# Patient Record
Sex: FEMALE | Race: BLACK OR AFRICAN AMERICAN | Employment: FULL TIME
[De-identification: names, ages, dates, MRNs, and addresses within clinical notes are randomized per-mention and may not be internally consistent; named-entity substitution may affect disease eponyms.]

---

## 2017-01-10 ENCOUNTER — ROUTINE PRENATAL (OUTPATIENT)
Dept: OBGYN | Facility: CLINIC | Age: 27
End: 2017-01-10

## 2017-01-10 VITALS
BODY MASS INDEX: 54.39 KG/M2 | HEART RATE: 88 BPM | WEIGHT: 293 LBS | SYSTOLIC BLOOD PRESSURE: 116 MMHG | DIASTOLIC BLOOD PRESSURE: 68 MMHG

## 2017-01-10 DIAGNOSIS — Z3A.38 38 WEEKS GESTATION OF PREGNANCY: Primary | ICD-10-CM

## 2017-01-10 DIAGNOSIS — N89.8 VAGINAL DISCHARGE: ICD-10-CM

## 2017-01-10 PROCEDURE — 99213 OFFICE O/P EST LOW 20 MIN: CPT | Performed by: OBSTETRICS & GYNECOLOGY

## 2017-01-18 RX ORDER — CLINDAMYCIN HYDROCHLORIDE 300 MG/1
300 CAPSULE ORAL 2 TIMES DAILY
Qty: 14 CAPSULE | Refills: 0 | Status: SHIPPED | OUTPATIENT
Start: 2017-01-18 | End: 2017-01-25

## 2017-02-08 ENCOUNTER — OFFICE VISIT (OUTPATIENT)
Dept: OBGYN | Facility: CLINIC | Age: 27
End: 2017-02-08

## 2017-02-08 VITALS
SYSTOLIC BLOOD PRESSURE: 120 MMHG | DIASTOLIC BLOOD PRESSURE: 82 MMHG | BODY MASS INDEX: 47.09 KG/M2 | HEIGHT: 66 IN | HEART RATE: 78 BPM | WEIGHT: 293 LBS

## 2017-02-08 DIAGNOSIS — Z98.891 S/P C-SECTION: ICD-10-CM

## 2017-02-08 DIAGNOSIS — Z30.09 FAMILY PLANNING EDUCATION, GUIDANCE, AND COUNSELING: ICD-10-CM

## 2017-02-08 DIAGNOSIS — D64.9 ANEMIA, UNSPECIFIED TYPE: ICD-10-CM

## 2017-02-08 PROCEDURE — 99024 POSTOP FOLLOW-UP VISIT: CPT | Performed by: NURSE PRACTITIONER

## 2017-02-08 ASSESSMENT — PATIENT HEALTH QUESTIONNAIRE - PHQ9
5. POOR APPETITE OR OVEREATING: 0
SUM OF ALL RESPONSES TO PHQ QUESTIONS 1-9: 5
9. THOUGHTS THAT YOU WOULD BE BETTER OFF DEAD, OR OF HURTING YOURSELF: 0
7. TROUBLE CONCENTRATING ON THINGS, SUCH AS READING THE NEWSPAPER OR WATCHING TELEVISION: 0
3. TROUBLE FALLING OR STAYING ASLEEP: 2
8. MOVING OR SPEAKING SO SLOWLY THAT OTHER PEOPLE COULD HAVE NOTICED. OR THE OPPOSITE, BEING SO FIGETY OR RESTLESS THAT YOU HAVE BEEN MOVING AROUND A LOT MORE THAN USUAL: 0
4. FEELING TIRED OR HAVING LITTLE ENERGY: 1
1. LITTLE INTEREST OR PLEASURE IN DOING THINGS: 1
2. FEELING DOWN, DEPRESSED OR HOPELESS: 1
6. FEELING BAD ABOUT YOURSELF - OR THAT YOU ARE A FAILURE OR HAVE LET YOURSELF OR YOUR FAMILY DOWN: 0
SUM OF ALL RESPONSES TO PHQ9 QUESTIONS 1 & 2: 2

## 2017-03-08 ENCOUNTER — OFFICE VISIT (OUTPATIENT)
Dept: OBGYN | Facility: CLINIC | Age: 27
End: 2017-03-08

## 2017-03-08 VITALS
HEIGHT: 66 IN | SYSTOLIC BLOOD PRESSURE: 120 MMHG | BODY MASS INDEX: 47.09 KG/M2 | WEIGHT: 293 LBS | DIASTOLIC BLOOD PRESSURE: 76 MMHG | HEART RATE: 78 BPM | RESPIRATION RATE: 15 BRPM

## 2017-03-08 DIAGNOSIS — Z32.02 NEGATIVE PREGNANCY TEST: ICD-10-CM

## 2017-03-08 DIAGNOSIS — Z30.42 FAMILY PLANNING, DEPO-PROVERA CONTRACEPTION MONITORING/ADMINISTRATION: ICD-10-CM

## 2017-03-08 DIAGNOSIS — Z98.891 S/P C-SECTION: ICD-10-CM

## 2017-03-08 LAB
CONTROL: NORMAL
PREGNANCY TEST URINE, POC: NEGATIVE

## 2017-03-08 PROCEDURE — 81025 URINE PREGNANCY TEST: CPT | Performed by: NURSE PRACTITIONER

## 2017-03-08 RX ORDER — MEDROXYPROGESTERONE ACETATE 150 MG/ML
150 INJECTION, SUSPENSION INTRAMUSCULAR ONCE
Status: COMPLETED | OUTPATIENT
Start: 2017-03-08 | End: 2017-03-08

## 2017-03-08 RX ADMIN — MEDROXYPROGESTERONE ACETATE 150 MG: 150 INJECTION, SUSPENSION INTRAMUSCULAR at 11:48

## 2017-06-05 ENCOUNTER — NURSE ONLY (OUTPATIENT)
Dept: OBGYN CLINIC | Age: 27
End: 2017-06-05
Payer: MEDICAID

## 2017-06-05 VITALS
WEIGHT: 293 LBS | SYSTOLIC BLOOD PRESSURE: 120 MMHG | HEIGHT: 66 IN | DIASTOLIC BLOOD PRESSURE: 76 MMHG | BODY MASS INDEX: 47.09 KG/M2

## 2017-06-05 DIAGNOSIS — Z30.09 FAMILY PLANNING: Primary | ICD-10-CM

## 2017-06-05 DIAGNOSIS — Z32.02 NEGATIVE PREGNANCY TEST: ICD-10-CM

## 2017-06-05 LAB
CONTROL: NORMAL
PREGNANCY TEST URINE, POC: NEGATIVE

## 2017-06-05 PROCEDURE — 81025 URINE PREGNANCY TEST: CPT | Performed by: NURSE PRACTITIONER

## 2017-06-05 PROCEDURE — 96372 THER/PROPH/DIAG INJ SC/IM: CPT | Performed by: NURSE PRACTITIONER

## 2017-06-05 RX ORDER — MEDROXYPROGESTERONE ACETATE 150 MG/ML
150 INJECTION, SUSPENSION INTRAMUSCULAR ONCE
Status: COMPLETED | OUTPATIENT
Start: 2017-06-05 | End: 2017-06-05

## 2017-06-05 RX ADMIN — MEDROXYPROGESTERONE ACETATE 150 MG: 150 INJECTION, SUSPENSION INTRAMUSCULAR at 12:35

## 2017-10-23 ENCOUNTER — OFFICE VISIT (OUTPATIENT)
Dept: OBGYN CLINIC | Age: 27
End: 2017-10-23
Payer: MEDICARE

## 2017-10-23 ENCOUNTER — HOSPITAL ENCOUNTER (OUTPATIENT)
Age: 27
Setting detail: SPECIMEN
Discharge: HOME | End: 2017-10-23
Payer: MEDICARE

## 2017-10-23 VITALS
HEIGHT: 66 IN | SYSTOLIC BLOOD PRESSURE: 120 MMHG | WEIGHT: 293 LBS | HEART RATE: 84 BPM | BODY MASS INDEX: 47.09 KG/M2 | DIASTOLIC BLOOD PRESSURE: 76 MMHG

## 2017-10-23 DIAGNOSIS — Z11.51 SPECIAL SCREENING EXAMINATION FOR HUMAN PAPILLOMAVIRUS (HPV): ICD-10-CM

## 2017-10-23 DIAGNOSIS — Z01.419 WELL WOMAN EXAM: ICD-10-CM

## 2017-10-23 DIAGNOSIS — Z01.419 WELL WOMAN EXAM: Primary | ICD-10-CM

## 2017-10-23 DIAGNOSIS — Z30.011 FAMILY PLANNING, BCP (BIRTH CONTROL PILLS) INITIAL PRESCRIPTION: ICD-10-CM

## 2017-10-23 DIAGNOSIS — N89.8 VAGINAL DISCHARGE: ICD-10-CM

## 2017-10-23 LAB
CONTROL: NORMAL
PREGNANCY TEST URINE, POC: NEGATIVE

## 2017-10-23 PROCEDURE — 99395 PREV VISIT EST AGE 18-39: CPT | Performed by: NURSE PRACTITIONER

## 2017-10-23 PROCEDURE — 87491 CHLMYD TRACH DNA AMP PROBE: CPT

## 2017-10-23 PROCEDURE — 87480 CANDIDA DNA DIR PROBE: CPT

## 2017-10-23 PROCEDURE — 87510 GARDNER VAG DNA DIR PROBE: CPT

## 2017-10-23 PROCEDURE — 81025 URINE PREGNANCY TEST: CPT | Performed by: NURSE PRACTITIONER

## 2017-10-23 PROCEDURE — G0145 SCR C/V CYTO,THINLAYER,RESCR: HCPCS

## 2017-10-23 PROCEDURE — 87591 N.GONORRHOEAE DNA AMP PROB: CPT

## 2017-10-23 PROCEDURE — 87660 TRICHOMONAS VAGIN DIR PROBE: CPT

## 2017-10-23 RX ORDER — METRONIDAZOLE 7.5 MG/G
GEL VAGINAL
Qty: 1 TUBE | Refills: 3 | Status: SHIPPED | OUTPATIENT
Start: 2017-10-23 | End: 2017-10-30

## 2017-10-23 RX ORDER — NORETHINDRONE ACETATE AND ETHINYL ESTRADIOL 1MG-20(21)
1 KIT ORAL DAILY
Qty: 1 PACKET | Refills: 3 | Status: SHIPPED | OUTPATIENT
Start: 2017-10-23 | End: 2019-05-01 | Stop reason: ALTCHOICE

## 2017-10-23 RX ORDER — CLINDAMYCIN HYDROCHLORIDE 300 MG/1
300 CAPSULE ORAL 3 TIMES DAILY
Qty: 30 CAPSULE | Refills: 0 | Status: SHIPPED | OUTPATIENT
Start: 2017-10-23 | End: 2017-11-02

## 2017-10-23 NOTE — PROGRESS NOTES
History and Physical  [address], [address] [address], [address] [address] [phone]   Fax [phone]  Ana Rosa Jackson  10/23/2017              31 y.o. Chief Complaint   Patient presents with   [facility] Gynecologic Exam    Other     cultures       No LMP recorded. Primary Care Physician: Aime Langford MD    The patient was seen and examined. She is here for her annual exam. Patient complaining of vaginal discharge on and off for the past several years. History of frequent BV infections. Requesting long term treatment. Last intercourse was over one month ago with condoms. Desires tubal ligation. Wants to start on OCPs prior to tubal ligation. Denies personal or fam history of blood clots to leg, lung or brain or sudden cardiac death prior to age 35. Planning on gastric sleeve surgery to be completed in  or February of next year. History of abnormal pap:2016 ASCUS/+HPV - did not complete recommended colposcopy. Her bowels are regular. There are no voiding complaints. She denies any bloating. She was counseled on STD's and the need for barrier contraception.      HPI : Ana Rosa Jackson is a 31 y.o. female [id number]    Annual exam  Vaginal discharge, hx of frequent BV infection  Desires tubal ligation, wants to start on OCPs while waiting for tubal ligation    ________________________________________________________________________  Obstetric History       T3      L3     SAB0   TAB0   Ectopic0   Molar0   Multiple0   Live Births2       # Outcome Date GA Lbr Jonathon/2nd Weight Sex Delivery Anes PTL Lv   3 Term 17 39w0d  8 lb 10 oz (3.912 kg) F CS-LTranv  N ADRIA   2 Term 07/20/15 39w4d  9 lb 3.4 oz (4.179 kg) M CS-LTranv Spinal N ADRIA      Apgar1:  5                Apgar5: 8   1 Term 04 40w0d  7 lb 8 oz (3.402 kg) M Vag-Spont           Past Medical History:   Diagnosis Date    Abnormal Pap smear of cervix 3/11/2015    Anemia complicating pregnancy in second trimester 3/11/2015    History of low transverse  section                                                                    Past Surgical History:   Procedure Laterality Date     SECTION  07/20/15     Family History   Problem Relation Age of Onset    Cancer Paternal Grandfather      unknown    Thyroid Disease Maternal Grandmother     Diabetes Maternal Grandfather     Cancer Maternal Grandfather      throat    Hypertension Maternal Grandfather     Depression Mother     Cancer Maternal Aunt      stomach    Cancer Maternal Uncle      prostate    Stroke Maternal Uncle      Social History     Social History    Marital status: Single     Spouse name: N/A    Number of children: N/A    Years of education: N/A     Occupational History    Not on file. Social History Main Topics    Smoking status: Never Smoker    Smokeless tobacco: Never Used    Alcohol use No    Drug use: No    Sexual activity: Yes     Partners: Male     Other Topics Concern    Not on file     Social History Narrative    No narrative on file       MEDICATIONS:  Current Outpatient Prescriptions   Medication Sig Dispense Refill    norethindrone-ethinyl estradiol ([address] ) 1-20 MG-MCG per tablet Take 1 tablet by mouth daily 1 packet 3    clindamycin (CLEOCIN) 300 MG capsule Take 1 capsule by mouth 3 times daily for 10 days 30 capsule 0    metroNIDAZOLE (METROGEL VAGINAL) 0.75 % vaginal gel One applicator intravaginally once a week after finishing Cleocin 1 Tube 3    ferrous sulfate 325 (65 FE) MG tablet Take 1 tablet by mouth daily (with breakfast) 30 tablet 3    valACYclovir (VALTREX) 500 MG tablet Take 1 tablet by mouth daily Start taking daily at 36 weeks daily 30 tablet 1    Prenatal Multivit-Min-Fe-FA (PRENATAL VITAMINS) 0.8 MG TABS Take 1 tablet by mouth daily 30 tablet 12     No current facility-administered medications for this visit. ALLERGIES:  Allergies as of 10/23/2017 - Review Complete 10/23/2017   Allergen Reaction Noted    Latex Dermatitis 07/19/2015       Symptoms of decreased mood absent  Symptoms of anhedonia absent    **If either question is answered in a  positive fashion then complete the PHQ9 Scoring Evaluation and make the appropriate referral**      Immunization status: stated as current, but no records available. Gynecologic History:  Menarche: 14 yo  Menopause at NA yo     No LMP recorded. Sexually Active: Yes    STD History: Yes Hx herpes    Permanent Sterilization: No   Reversible Birth Control: Yes - last depo injection about 12 weeks ago        Hormone Replacement Exposure: No      Genetic Qualified Family History of Breast, Ovarian , Colon or Uterine Cancer: No     If YES see scanned worksheet. Preventative Health Testing:    Health Maintenance:  Health Maintenance Due   Topic Date Due    DTaP/Tdap/Td vaccine (1 - Tdap) 12/25/2008    Cervical cancer screen  07/13/2017    Flu vaccine (1) 09/01/2017       Date of Last Pap Smear: 7/13/2016 ASCUS/+HPV  Abnormal Pap Smear History: see above  Colposcopy History: did not complete recommended colposcopy  Date of Last Mammogram: NA  Date of Last Colonoscopy:   Date of Last Bone Density:      ________________________________________________________________________        REVIEW OF SYSTEMS:    yes  A minimum of an eleven point review of systems was completed. Review Of Systems (11 point):  Constitutional: No fever, chills or malaise;  No weight change or fatigue  Head and Eyes: No vision, Headache, Dizziness or trauma in last 12 months  ENT ROS: No hearing, Tinnitis, sinus or taste problems  Hematological and Lymphatic ROS:No Lymphoma, Von Willebrand's, Hemophillia or Bleeding History  Psych ROS: No Depression, Homicidal thoughts,suicidal thoughts, or anxiety  Breast ROS: No prior breast abnormalities or lumps  Respiratory ROS: No SOB, Pneumoniae,Cough, or reversible and non. She is aware that hormonal based birth control may increase her risk of developing a blood clot which may increase her morbidity and or mortality. She was counseled on alternate non hormonal based contraception options. We discussed that smoking and any hormonal based contraception may increase the patients risks of developing these life threatening blood clots. All patients are encouraged to stop smoking at the time of contraceptive counseling. Cessation programs were reviewed. The patient was instructed to use barrier contraception for sexually transmitted disease prevention. The patient was also informed of antibiotics decreasing contraceptive efficacy and the need for barrier contraception from the onset of her antibiotic dosing and through a minimum of thirty days from antibiotic cessation. The life threatening side effect profile was reviewed in detail this includes but is not limited to shortness of breath, chest pain, severe or persistent headaches, or calf pain. If any of these occur the patient has been instructed to stop using her hormonal based contraception, notify the office, and go to the emergency department or call 911. The patient denied any personal history of blood clots in her leg, lung, or heart and denied any family history of stroke, TIA, sudden cardiac death < 36 y.o.,pulmonary embolism, or deep venous thrombosis. PLAN:  Return in about 4 weeks (around 11/20/2017) for physician to discuss tubal ligation. Prescription for Metrogel and clindamycin sent to pharmacy. Vaginal cultures obtained. Call for results. Pap smear obtained. Patient denies any personal or family history of blood clots or sudden cardiac death prior to age 35. Signs hormonal birth control consent form. Prescription for Loestrin Fe 1/20 sent to pharmacy. Urine pregnancy test negative in office. To start first pack today.   Return for follow up with physician to discuss tubal

## 2017-10-24 LAB
C TRACH DNA GENITAL QL NAA+PROBE: NEGATIVE
DIRECT EXAM: NORMAL
Lab: NORMAL
N. GONORRHOEAE DNA: NEGATIVE
SPECIMEN DESCRIPTION: NORMAL
SPECIMEN DESCRIPTION: NORMAL
STATUS: NORMAL

## 2017-10-31 LAB — CYTOLOGY REPORT: NORMAL

## 2017-12-28 ENCOUNTER — TELEPHONE (OUTPATIENT)
Dept: OBGYN CLINIC | Age: 27
End: 2017-12-28

## 2017-12-28 RX ORDER — PSEUDOEPHEDRINE HCL 30 MG
100 TABLET ORAL 2 TIMES DAILY
Qty: 60 CAPSULE | Refills: 0 | Status: SHIPPED | OUTPATIENT
Start: 2017-12-28 | End: 2018-07-09

## 2018-01-03 ENCOUNTER — TELEPHONE (OUTPATIENT)
Dept: OBGYN CLINIC | Age: 28
End: 2018-01-03

## 2018-01-03 RX ORDER — VALACYCLOVIR HYDROCHLORIDE 500 MG/1
500 TABLET, FILM COATED ORAL 2 TIMES DAILY
Qty: 30 TABLET | Refills: 0 | Status: SHIPPED | OUTPATIENT
Start: 2018-01-03 | End: 2018-11-20 | Stop reason: SDUPTHER

## 2018-07-09 ENCOUNTER — OFFICE VISIT (OUTPATIENT)
Dept: FAMILY MEDICINE CLINIC | Age: 28
End: 2018-07-09
Payer: MEDICARE

## 2018-07-09 VITALS
BODY MASS INDEX: 47.09 KG/M2 | HEART RATE: 76 BPM | DIASTOLIC BLOOD PRESSURE: 80 MMHG | SYSTOLIC BLOOD PRESSURE: 130 MMHG | RESPIRATION RATE: 16 BRPM | WEIGHT: 293 LBS | HEIGHT: 66 IN

## 2018-07-09 DIAGNOSIS — K21.00 GASTROESOPHAGEAL REFLUX DISEASE WITH ESOPHAGITIS: Primary | ICD-10-CM

## 2018-07-09 DIAGNOSIS — Z00.00 WELL ADULT EXAM: ICD-10-CM

## 2018-07-09 PROCEDURE — 99204 OFFICE O/P NEW MOD 45 MIN: CPT | Performed by: FAMILY MEDICINE

## 2018-07-09 PROCEDURE — 1036F TOBACCO NON-USER: CPT | Performed by: FAMILY MEDICINE

## 2018-07-09 PROCEDURE — G8417 CALC BMI ABV UP PARAM F/U: HCPCS | Performed by: FAMILY MEDICINE

## 2018-07-09 PROCEDURE — G8427 DOCREV CUR MEDS BY ELIG CLIN: HCPCS | Performed by: FAMILY MEDICINE

## 2018-07-09 RX ORDER — PHENTERMINE HYDROCHLORIDE 37.5 MG/1
37.5 TABLET ORAL
Qty: 30 TABLET | Refills: 0 | Status: SHIPPED | OUTPATIENT
Start: 2018-07-09 | End: 2018-08-08

## 2018-07-09 RX ORDER — PANTOPRAZOLE SODIUM 40 MG/1
40 TABLET, DELAYED RELEASE ORAL
Qty: 30 TABLET | Refills: 3 | Status: SHIPPED | OUTPATIENT
Start: 2018-07-09 | End: 2019-05-01 | Stop reason: ALTCHOICE

## 2018-07-09 ASSESSMENT — PATIENT HEALTH QUESTIONNAIRE - PHQ9
SUM OF ALL RESPONSES TO PHQ9 QUESTIONS 1 & 2: 0
SUM OF ALL RESPONSES TO PHQ QUESTIONS 1-9: 0
2. FEELING DOWN, DEPRESSED OR HOPELESS: 0
1. LITTLE INTEREST OR PLEASURE IN DOING THINGS: 0

## 2018-07-09 NOTE — PROGRESS NOTES
[facility] PHYSICIANS  COMPREHENSIVE CARE  [facility] [address]  [address] [address]  Dept: [phone]      Faustino Rogers is a 28 y.o. female who presents today for follow up on her  medical conditions as noted below. Chief Complaint   Patient presents with   [employer] Establish Care       Patient Active Problem List:     BMI 45.0-49.9, adult ([address]     H/O abnormal cervical Papanicolaou smear     Anal warts     Rh+/RI/GBS-     Hx of LTCS x 1     H/O LGSIL (1/29/15)     HSV-2 infection     ASC-US with HRHPV (16)     History of low transverse  section     Past Medical History:   Diagnosis Date    Abnormal Pap smear of cervix     Anemia complicating pregnancy in second trimester 3/11/2015    History of low transverse  section       Past Surgical History:   Procedure Laterality Date     SECTION  07/20/15     Family History   Problem Relation Age of Onset    Cancer Paternal Grandfather         unknown    Thyroid Disease Maternal Grandmother     Diabetes Maternal Grandfather     Cancer Maternal Grandfather         throat    Hypertension Maternal Grandfather     Depression Mother     Cancer Maternal Aunt         stomach    Cancer Maternal Uncle         prostate    Stroke Maternal Uncle        Current Outpatient Prescriptions   Medication Sig Dispense Refill    phentermine (ADIPEX-P) 37.5 MG tablet Take 1 tablet by mouth every morning (before breakfast) for 30 days. . 30 tablet 0    pantoprazole (PROTONIX) 40 MG tablet Take 1 tablet by mouth daily (with breakfast) 30 tablet 3    norethindrone-ethinyl estradiol (LOESTRIN FE ) 1-20 MG-MCG per tablet Take 1 tablet by mouth daily 1 packet 3     No current facility-administered medications for this visit.       ALLERGIES:    Allergies   Allergen Reactions    Latex Dermatitis       Social History   Substance Use Topics    Smoking status: Never Smoker    Smokeless tobacco: Never Used    Alcohol use No

## 2018-08-14 ENCOUNTER — OFFICE VISIT (OUTPATIENT)
Dept: FAMILY MEDICINE CLINIC | Age: 28
End: 2018-08-14
Payer: MEDICARE

## 2018-08-14 VITALS
RESPIRATION RATE: 16 BRPM | SYSTOLIC BLOOD PRESSURE: 126 MMHG | HEIGHT: 66 IN | WEIGHT: 293 LBS | HEART RATE: 80 BPM | BODY MASS INDEX: 47.09 KG/M2 | DIASTOLIC BLOOD PRESSURE: 80 MMHG

## 2018-08-14 PROCEDURE — G8417 CALC BMI ABV UP PARAM F/U: HCPCS | Performed by: FAMILY MEDICINE

## 2018-08-14 PROCEDURE — 99213 OFFICE O/P EST LOW 20 MIN: CPT | Performed by: FAMILY MEDICINE

## 2018-08-14 PROCEDURE — 1036F TOBACCO NON-USER: CPT | Performed by: FAMILY MEDICINE

## 2018-08-14 PROCEDURE — G8427 DOCREV CUR MEDS BY ELIG CLIN: HCPCS | Performed by: FAMILY MEDICINE

## 2018-08-14 RX ORDER — PHENTERMINE HYDROCHLORIDE 37.5 MG/1
37.5 TABLET ORAL
Qty: 30 TABLET | Refills: 0 | Status: SHIPPED | OUTPATIENT
Start: 2018-08-14 | End: 2018-09-13

## 2018-08-14 ASSESSMENT — PATIENT HEALTH QUESTIONNAIRE - PHQ9
SUM OF ALL RESPONSES TO PHQ QUESTIONS 1-9: 0
SUM OF ALL RESPONSES TO PHQ QUESTIONS 1-9: 0
2. FEELING DOWN, DEPRESSED OR HOPELESS: 0
1. LITTLE INTEREST OR PLEASURE IN DOING THINGS: 0
SUM OF ALL RESPONSES TO PHQ9 QUESTIONS 1 & 2: 0

## 2018-08-14 NOTE — PROGRESS NOTES
[facility] PHYSICIANS  COMPREHENSIVE CARE  [facility] [address]  [address] [address]  Dept: [phone]      Good Gonzalez is a 28 y.o. female who presents today for follow up on her  medical conditions as noted below. Chief Complaint   Patient presents with    Medication Refill     she needs to restart adipex. she never started the adipex last month    Abdominal Pain     bloated-abdominal pain. Patient Active Problem List:     BMI 45.0-49.9, adult ([facility])     H/O abnormal cervical Papanicolaou smear     Anal warts     Rh+/RI/GBS-     Hx of LTCS x 1     H/O LGSIL (1/29/15)     HSV-2 infection     ASC-US with HRHPV (16)     History of low transverse  section     Past Medical History:   Diagnosis Date    Abnormal Pap smear of cervix 2527    Anemia complicating pregnancy in second trimester 3/11/2015    History of low transverse  section       Past Surgical History:   Procedure Laterality Date     SECTION  07/20/15     Family History   Problem Relation Age of Onset    Cancer Paternal Grandfather         unknown    Thyroid Disease Maternal Grandmother     Diabetes Maternal Grandfather     Cancer Maternal Grandfather         throat    Hypertension Maternal Grandfather     Depression Mother     Cancer Maternal Aunt         stomach    Cancer Maternal Uncle         prostate    Stroke Maternal Uncle        Current Outpatient Prescriptions   Medication Sig Dispense Refill    phentermine (ADIPEX-P) 37.5 MG tablet Take 1 tablet by mouth every morning (before breakfast) for 30 days. . 30 tablet 0    pantoprazole (PROTONIX) 40 MG tablet Take 1 tablet by mouth daily (with breakfast) 30 tablet 3    norethindrone-ethinyl estradiol (LOESTRIN FE ) 1-20 MG-MCG per tablet Take 1 tablet by mouth daily 1 packet 3     No current facility-administered medications for this visit.       ALLERGIES:    Allergies   Allergen Reactions    Latex Dermatitis       Social

## 2018-08-17 ENCOUNTER — TELEPHONE (OUTPATIENT)
Dept: FAMILY MEDICINE CLINIC | Age: 28
End: 2018-08-17

## 2018-08-17 NOTE — TELEPHONE ENCOUNTER
Pharmacy called and said Pt did not  Adipex script from 7/09/18 and came to  med today and states pt may have to start process over again in 6 months. Pt also called and states she did not  the adipex last month and wants to start it this month. Is this ok? Please advise.

## 2018-09-27 ENCOUNTER — TELEPHONE (OUTPATIENT)
Dept: FAMILY MEDICINE CLINIC | Age: 28
End: 2018-09-27

## 2018-09-27 NOTE — TELEPHONE ENCOUNTER
Pt states she is having UTI symptoms, cloudy, strong smelling urine, frequency, urgency for the past week. Pt would like something called into the [facility] on [address]. Please advise, thank you.

## 2018-10-08 ENCOUNTER — TELEPHONE (OUTPATIENT)
Dept: OBGYN CLINIC | Age: 28
End: 2018-10-08

## 2018-10-08 DIAGNOSIS — R35.0 FREQUENT URINATION: Primary | ICD-10-CM

## 2018-10-23 ENCOUNTER — TELEPHONE (OUTPATIENT)
Dept: OBGYN CLINIC | Age: 28
End: 2018-10-23

## 2018-10-23 DIAGNOSIS — R35.0 FREQUENT URINATION: Primary | ICD-10-CM

## 2018-10-23 RX ORDER — CEPHALEXIN 500 MG/1
500 CAPSULE ORAL 4 TIMES DAILY
Qty: 40 CAPSULE | Refills: 0 | Status: SHIPPED | OUTPATIENT
Start: 2018-10-23 | End: 2018-11-02

## 2018-10-24 ENCOUNTER — HOSPITAL ENCOUNTER (OUTPATIENT)
Age: 28
Discharge: HOME | End: 2018-10-24
Payer: MEDICARE

## 2018-10-24 DIAGNOSIS — R35.0 FREQUENT URINATION: ICD-10-CM

## 2018-10-24 LAB
-: ABNORMAL
AMORPHOUS: ABNORMAL
BACTERIA: ABNORMAL
BILIRUBIN URINE: NEGATIVE
CASTS UA: ABNORMAL /LPF
COLOR: YELLOW
COMMENT UA: ABNORMAL
CRYSTALS, UA: ABNORMAL /HPF
EPITHELIAL CELLS UA: ABNORMAL /HPF
GLUCOSE URINE: NEGATIVE
KETONES, URINE: NEGATIVE
LEUKOCYTE ESTERASE, URINE: ABNORMAL
MUCUS: ABNORMAL
NITRITE, URINE: POSITIVE
OTHER OBSERVATIONS UA: ABNORMAL
PH UA: 6 (ref 5–8)
PROTEIN UA: ABNORMAL
RBC UA: ABNORMAL /HPF
RENAL EPITHELIAL, UA: ABNORMAL /HPF
SPECIFIC GRAVITY UA: 1.03 (ref 1–1.03)
TRICHOMONAS: ABNORMAL
TURBIDITY: ABNORMAL
URINE HGB: ABNORMAL
UROBILINOGEN, URINE: NORMAL
WBC UA: ABNORMAL /HPF
YEAST: ABNORMAL

## 2018-10-24 PROCEDURE — 87088 URINE BACTERIA CULTURE: CPT

## 2018-10-24 PROCEDURE — 87086 URINE CULTURE/COLONY COUNT: CPT

## 2018-10-24 PROCEDURE — 81001 URINALYSIS AUTO W/SCOPE: CPT

## 2018-10-24 PROCEDURE — 87186 SC STD MICRODIL/AGAR DIL: CPT

## 2018-10-25 ENCOUNTER — TELEPHONE (OUTPATIENT)
Dept: OBGYN CLINIC | Age: 28
End: 2018-10-25

## 2018-10-25 LAB
CULTURE: ABNORMAL
Lab: ABNORMAL
ORGANISM: ABNORMAL
SPECIMEN DESCRIPTION: ABNORMAL
STATUS: ABNORMAL

## 2018-10-25 RX ORDER — CIPROFLOXACIN 500 MG/1
500 TABLET, FILM COATED ORAL 2 TIMES DAILY
Qty: 14 TABLET | Refills: 0 | Status: SHIPPED | OUTPATIENT
Start: 2018-10-25 | End: 2018-11-01

## 2018-10-25 NOTE — TELEPHONE ENCOUNTER
----- Message from PRADIP Hauser CNP sent at 10/24/2018  4:12 PM EDT -----  +UTI  Cipro 500mg PO BID X 7 days

## 2018-11-20 RX ORDER — VALACYCLOVIR HYDROCHLORIDE 500 MG/1
500 TABLET, FILM COATED ORAL 2 TIMES DAILY
Qty: 30 TABLET | Refills: 0 | Status: SHIPPED | OUTPATIENT
Start: 2018-11-20 | End: 2018-11-23

## 2018-11-28 ENCOUNTER — TELEPHONE (OUTPATIENT)
Dept: BARIATRICS/WEIGHT MGMT | Age: 28
End: 2018-11-28

## 2018-11-28 NOTE — TELEPHONE ENCOUNTER
Online Info Session Completed:  on 11/21/18 okay to schedule with either surgeon. Verified Insurance Benefit   with PATIENT HAS NO INSURANCE. She is hoping to get insurance at the first of the year. She will call back in January    I did tell her the private pay packages also. She will call back at the beginning of the year.

## 2019-01-03 ENCOUNTER — HOSPITAL ENCOUNTER (EMERGENCY)
Age: 29
Discharge: HOME | End: 2019-01-03
Attending: EMERGENCY MEDICINE

## 2019-01-03 VITALS
BODY MASS INDEX: 47.09 KG/M2 | WEIGHT: 293 LBS | OXYGEN SATURATION: 98 % | SYSTOLIC BLOOD PRESSURE: 112 MMHG | RESPIRATION RATE: 17 BRPM | DIASTOLIC BLOOD PRESSURE: 99 MMHG | HEIGHT: 66 IN | HEART RATE: 77 BPM | TEMPERATURE: 98.2 F

## 2019-01-03 DIAGNOSIS — R55 NEAR SYNCOPE: Primary | ICD-10-CM

## 2019-01-03 DIAGNOSIS — E86.0 DEHYDRATION: ICD-10-CM

## 2019-01-03 LAB
-: ABNORMAL
ABSOLUTE EOS #: 0.2 K/UL (ref 0–0.4)
ABSOLUTE IMMATURE GRANULOCYTE: ABNORMAL K/UL (ref 0–0.3)
ABSOLUTE LYMPH #: 3.8 K/UL (ref 1–4.8)
ABSOLUTE MONO #: 0.5 K/UL (ref 0.1–1.3)
AMORPHOUS: ABNORMAL
ANION GAP SERPL CALCULATED.3IONS-SCNC: 12 MMOL/L (ref 9–17)
BACTERIA: ABNORMAL
BASOPHILS # BLD: 1 % (ref 0–2)
BASOPHILS ABSOLUTE: 0.1 K/UL (ref 0–0.2)
BILIRUBIN URINE: NEGATIVE
BUN BLDV-MCNC: 14 MG/DL (ref 6–20)
BUN/CREAT BLD: NORMAL (ref 9–20)
CALCIUM SERPL-MCNC: 9.4 MG/DL (ref 8.6–10.4)
CASTS UA: ABNORMAL /LPF
CHLORIDE BLD-SCNC: 102 MMOL/L (ref 98–107)
CO2: 23 MMOL/L (ref 20–31)
COLOR: YELLOW
COMMENT UA: ABNORMAL
CREAT SERPL-MCNC: 0.72 MG/DL (ref 0.5–0.9)
CRYSTALS, UA: ABNORMAL /HPF
DIFFERENTIAL TYPE: ABNORMAL
EKG ATRIAL RATE: 78 BPM
EKG P AXIS: -10 DEGREES
EKG P-R INTERVAL: 136 MS
EKG Q-T INTERVAL: 382 MS
EKG QRS DURATION: 82 MS
EKG QTC CALCULATION (BAZETT): 435 MS
EKG R AXIS: 21 DEGREES
EKG T AXIS: 2 DEGREES
EKG VENTRICULAR RATE: 78 BPM
EOSINOPHILS RELATIVE PERCENT: 2 % (ref 0–4)
EPITHELIAL CELLS UA: ABNORMAL /HPF
GFR AFRICAN AMERICAN: >60 ML/MIN
GFR NON-AFRICAN AMERICAN: >60 ML/MIN
GFR SERPL CREATININE-BSD FRML MDRD: NORMAL ML/MIN/{1.73_M2}
GFR SERPL CREATININE-BSD FRML MDRD: NORMAL ML/MIN/{1.73_M2}
GLUCOSE BLD-MCNC: 76 MG/DL (ref 70–99)
GLUCOSE URINE: NEGATIVE
HCG(URINE) PREGNANCY TEST: NEGATIVE
HCT VFR BLD CALC: 35 % (ref 36–46)
HEMOGLOBIN: 11 G/DL (ref 12–16)
IMMATURE GRANULOCYTES: ABNORMAL %
KETONES, URINE: NEGATIVE
LEUKOCYTE ESTERASE, URINE: NEGATIVE
LYMPHOCYTES # BLD: 37 % (ref 24–44)
MCH RBC QN AUTO: 24.3 PG (ref 26–34)
MCHC RBC AUTO-ENTMCNC: 31.6 G/DL (ref 31–37)
MCV RBC AUTO: 76.9 FL (ref 80–100)
MONOCYTES # BLD: 5 % (ref 1–7)
MUCUS: ABNORMAL
NITRITE, URINE: NEGATIVE
NRBC AUTOMATED: ABNORMAL PER 100 WBC
OTHER OBSERVATIONS UA: ABNORMAL
PDW BLD-RTO: 16.9 % (ref 11.5–14.9)
PH UA: 5.5 (ref 5–8)
PLATELET # BLD: 359 K/UL (ref 150–450)
PLATELET ESTIMATE: ABNORMAL
PMV BLD AUTO: 7.5 FL (ref 6–12)
POTASSIUM SERPL-SCNC: 4 MMOL/L (ref 3.7–5.3)
PROTEIN UA: NEGATIVE
RBC # BLD: 4.55 M/UL (ref 4–5.2)
RBC # BLD: ABNORMAL 10*6/UL
RBC UA: ABNORMAL /HPF
RENAL EPITHELIAL, UA: ABNORMAL /HPF
SEG NEUTROPHILS: 55 % (ref 36–66)
SEGMENTED NEUTROPHILS ABSOLUTE COUNT: 5.7 K/UL (ref 1.3–9.1)
SODIUM BLD-SCNC: 137 MMOL/L (ref 135–144)
SPECIFIC GRAVITY UA: 1.02 (ref 1–1.03)
TRICHOMONAS: ABNORMAL
TURBIDITY: ABNORMAL
URINE HGB: ABNORMAL
UROBILINOGEN, URINE: NORMAL
WBC # BLD: 10.4 K/UL (ref 3.5–11)
WBC # BLD: ABNORMAL 10*3/UL
WBC UA: ABNORMAL /HPF
YEAST: ABNORMAL

## 2019-01-03 PROCEDURE — 81001 URINALYSIS AUTO W/SCOPE: CPT

## 2019-01-03 PROCEDURE — 85025 COMPLETE CBC W/AUTO DIFF WBC: CPT

## 2019-01-03 PROCEDURE — 96361 HYDRATE IV INFUSION ADD-ON: CPT

## 2019-01-03 PROCEDURE — 96360 HYDRATION IV INFUSION INIT: CPT

## 2019-01-03 PROCEDURE — 36415 COLL VENOUS BLD VENIPUNCTURE: CPT

## 2019-01-03 PROCEDURE — 2580000003 HC RX 258: Performed by: EMERGENCY MEDICINE

## 2019-01-03 PROCEDURE — 84703 CHORIONIC GONADOTROPIN ASSAY: CPT

## 2019-01-03 PROCEDURE — 80048 BASIC METABOLIC PNL TOTAL CA: CPT

## 2019-01-03 PROCEDURE — 99284 EMERGENCY DEPT VISIT MOD MDM: CPT

## 2019-01-03 PROCEDURE — 93005 ELECTROCARDIOGRAM TRACING: CPT

## 2019-01-03 RX ORDER — 0.9 % SODIUM CHLORIDE 0.9 %
1000 INTRAVENOUS SOLUTION INTRAVENOUS ONCE
Status: COMPLETED | OUTPATIENT
Start: 2019-01-03 | End: 2019-01-03

## 2019-01-03 RX ADMIN — SODIUM CHLORIDE 1000 ML: 9 INJECTION, SOLUTION INTRAVENOUS at 15:32

## 2019-01-03 ASSESSMENT — ENCOUNTER SYMPTOMS
VOMITING: 0
CONSTIPATION: 0
SHORTNESS OF BREATH: 0
DIARRHEA: 0
COLOR CHANGE: 0
CHEST TIGHTNESS: 0
COUGH: 0
SINUS PRESSURE: 0
SORE THROAT: 0
FACIAL SWELLING: 0
NAUSEA: 0

## 2019-03-16 ENCOUNTER — HOSPITAL ENCOUNTER (EMERGENCY)
Age: 29
Discharge: HOME | End: 2019-03-16
Attending: EMERGENCY MEDICINE
Payer: MEDICARE

## 2019-03-16 VITALS
WEIGHT: 293 LBS | HEART RATE: 104 BPM | DIASTOLIC BLOOD PRESSURE: 44 MMHG | HEIGHT: 66 IN | RESPIRATION RATE: 18 BRPM | OXYGEN SATURATION: 100 % | TEMPERATURE: 97.3 F | BODY MASS INDEX: 47.09 KG/M2 | SYSTOLIC BLOOD PRESSURE: 123 MMHG

## 2019-03-16 DIAGNOSIS — R68.89 SENSATION OF SWOLLEN THROAT: Primary | ICD-10-CM

## 2019-03-16 PROCEDURE — 99284 EMERGENCY DEPT VISIT MOD MDM: CPT

## 2019-03-16 RX ORDER — OMEPRAZOLE 40 MG/1
40 CAPSULE, DELAYED RELEASE ORAL
Qty: 30 CAPSULE | Refills: 0 | Status: SHIPPED | OUTPATIENT
Start: 2019-03-16 | End: 2019-05-01 | Stop reason: ALTCHOICE

## 2019-03-16 ASSESSMENT — ENCOUNTER SYMPTOMS
CHOKING: 0
COUGH: 0
VOMITING: 0
STRIDOR: 0
SORE THROAT: 1
TROUBLE SWALLOWING: 0
ABDOMINAL PAIN: 0
RHINORRHEA: 0
WHEEZING: 0
NAUSEA: 0

## 2019-05-01 ENCOUNTER — HOSPITAL ENCOUNTER (OUTPATIENT)
Age: 29
Setting detail: SPECIMEN
Discharge: HOME | End: 2019-05-01
Payer: MEDICARE

## 2019-05-01 ENCOUNTER — OFFICE VISIT (OUTPATIENT)
Dept: FAMILY MEDICINE CLINIC | Age: 29
End: 2019-05-01
Payer: MEDICARE

## 2019-05-01 VITALS
HEART RATE: 85 BPM | WEIGHT: 293 LBS | OXYGEN SATURATION: 98 % | RESPIRATION RATE: 18 BRPM | DIASTOLIC BLOOD PRESSURE: 78 MMHG | SYSTOLIC BLOOD PRESSURE: 118 MMHG | HEIGHT: 66 IN | BODY MASS INDEX: 47.09 KG/M2

## 2019-05-01 DIAGNOSIS — E66.01 CLASS 3 SEVERE OBESITY DUE TO EXCESS CALORIES WITHOUT SERIOUS COMORBIDITY WITH BODY MASS INDEX (BMI) OF 60.0 TO 69.9 IN ADULT (HCC): ICD-10-CM

## 2019-05-01 DIAGNOSIS — N39.0 URINARY TRACT INFECTION WITH HEMATURIA, SITE UNSPECIFIED: ICD-10-CM

## 2019-05-01 DIAGNOSIS — R31.9 URINARY TRACT INFECTION WITH HEMATURIA, SITE UNSPECIFIED: ICD-10-CM

## 2019-05-01 DIAGNOSIS — R35.0 URINARY FREQUENCY: ICD-10-CM

## 2019-05-01 DIAGNOSIS — Z76.89 ENCOUNTER TO ESTABLISH CARE: Primary | ICD-10-CM

## 2019-05-01 PROBLEM — E66.813 CLASS 3 SEVERE OBESITY DUE TO EXCESS CALORIES WITHOUT SERIOUS COMORBIDITY WITH BODY MASS INDEX (BMI) OF 60.0 TO 69.9 IN ADULT: Status: ACTIVE | Noted: 2019-05-01

## 2019-05-01 LAB
BILIRUBIN, POC: NORMAL
BLOOD URINE, POC: NORMAL
CLARITY, POC: CLEAR
COLOR, POC: YELLOW
GLUCOSE URINE, POC: NORMAL
KETONES, POC: NORMAL
LEUKOCYTE EST, POC: NORMAL
NITRITE, POC: NORMAL
PH, POC: 5.5
PROTEIN, POC: NORMAL
SPECIFIC GRAVITY, POC: 1.03
UROBILINOGEN, POC: 0.2

## 2019-05-01 PROCEDURE — 99204 OFFICE O/P NEW MOD 45 MIN: CPT | Performed by: NURSE PRACTITIONER

## 2019-05-01 PROCEDURE — G8427 DOCREV CUR MEDS BY ELIG CLIN: HCPCS | Performed by: NURSE PRACTITIONER

## 2019-05-01 PROCEDURE — 81003 URINALYSIS AUTO W/O SCOPE: CPT | Performed by: NURSE PRACTITIONER

## 2019-05-01 PROCEDURE — 1036F TOBACCO NON-USER: CPT | Performed by: NURSE PRACTITIONER

## 2019-05-01 PROCEDURE — G8417 CALC BMI ABV UP PARAM F/U: HCPCS | Performed by: NURSE PRACTITIONER

## 2019-05-01 RX ORDER — NITROFURANTOIN 25; 75 MG/1; MG/1
100 CAPSULE ORAL 2 TIMES DAILY
Qty: 14 CAPSULE | Refills: 0 | Status: SHIPPED | OUTPATIENT
Start: 2019-05-01 | End: 2019-05-08

## 2019-05-01 RX ORDER — VITAMINS AND MINERALS 1; 1000; 100; 400; 30; 3; 3; 15; 20; 12; 7; 200; 29; 20 MG/1; [IU]/1; MG/1; [IU]/1; [IU]/1; MG/1; MG/1; MG/1; MG/1; UG/1; MG/1; MG/1; MG/1; MG/1
TABLET, CHEWABLE ORAL
Refills: 0 | COMMUNITY
Start: 2019-04-26 | End: 2019-05-01 | Stop reason: ALTCHOICE

## 2019-05-01 ASSESSMENT — ENCOUNTER SYMPTOMS
WHEEZING: 0
BLOOD IN STOOL: 0
CHOKING: 0
RESPIRATORY NEGATIVE: 1
COUGH: 0
COLOR CHANGE: 0
ANAL BLEEDING: 0
ABDOMINAL PAIN: 0
STRIDOR: 0
VOMITING: 0
DIARRHEA: 0
BACK PAIN: 1
SHORTNESS OF BREATH: 0
EYES NEGATIVE: 1
NAUSEA: 0

## 2019-05-01 ASSESSMENT — PATIENT HEALTH QUESTIONNAIRE - PHQ9
SUM OF ALL RESPONSES TO PHQ QUESTIONS 1-9: 0
2. FEELING DOWN, DEPRESSED OR HOPELESS: 0
SUM OF ALL RESPONSES TO PHQ9 QUESTIONS 1 & 2: 0
1. LITTLE INTEREST OR PLEASURE IN DOING THINGS: 0
SUM OF ALL RESPONSES TO PHQ QUESTIONS 1-9: 0

## 2019-05-01 NOTE — PROGRESS NOTES
Anemia complicating pregnancy in second trimester 3/11/2015      Past Surgical History:   Procedure Laterality Date     SECTION  07/20/15       Family History   Problem Relation Age of Onset    Cancer Paternal Grandfather         unknown- Lung     Thyroid Disease Maternal Grandmother     Diabetes Maternal Grandfather     Cancer Maternal Grandfather         throat    Hypertension Maternal Grandfather     Depression Mother     Cancer Maternal Aunt         stomach    Cancer Maternal Uncle         prostate    Stroke Maternal Uncle     Diabetes Maternal Uncle        Social History     Tobacco Use    Smoking status: Never Smoker    Smokeless tobacco: Never Used   Substance Use Topics    Alcohol use: No      No current outpatient medications on file. No current facility-administered medications for this visit. Allergies   Allergen Reactions    Latex Dermatitis         HPI:     HPI    Presents today as new patient  Used to see Dr. Kristi Mcfarlane   Specialists - Dr. Ji Leon - Dr. Magali Mott, plans for gastric sleeve in 3 months  Patient has 3 children 14, 3, 2    Hx notes sleep apnea but pt declines hx of sleep study or CPAP machine     C/o urinary frequency/urgency and incomplete bladder sensation x 3 weeks   Symptoms are unchanged from the start   Declines associated burning, fever/chills, abdominal pain, hematuria, flank pain, or abnormal vaginal discharge   Patient does have a history of UTI's and chronic low back pain that is unchanged related to weight   No history of pyelonephrosis   Birth control - IUD   Patient hasn't tried anything for her symptoms     Health Maintenance:      Subjective:     Review of Systems   Constitutional: Negative for appetite change, chills, fatigue, fever and unexpected weight change (variable). HENT: Negative. Eyes: Negative. Respiratory: Negative. Negative for cough, choking, shortness of breath, wheezing and stridor.     Cardiovascular: distress. She has no decreased breath sounds. She has no wheezes. She has no rhonchi. She has no rales. Abdominal: Soft. Bowel sounds are normal. She exhibits no distension. There is no tenderness. There is no rebound and no guarding. Musculoskeletal: Normal range of motion. She exhibits no edema, tenderness or deformity. Lymphadenopathy:     She has no cervical adenopathy. Neurological: She is alert and oriented to person, place, and time. She has normal strength. She displays no atrophy and no tremor. She displays no seizure activity. Gait normal. GCS eye subscore is 4. GCS verbal subscore is 5. GCS motor subscore is 6. Skin: Skin is warm, dry and intact. No rash noted. She is not diaphoretic. No erythema. No pallor. Psychiatric: She has a normal mood and affect. Her speech is normal and behavior is normal. Judgment and thought content normal. Cognition and memory are normal.         Assessment:         1. Encounter to establish care    2. Urinary frequency    3. Urinary tract infection with hematuria, site unspecified    4. Class 3 severe obesity due to excess calories without serious comorbidity with body mass index (BMI) of 60.0 to 69.9 in adult [facility])        Plan:     1. Encounter to establish care    To f/u with GYN for women's health needs     2. Urinary frequency    - POCT Urinalysis No Micro (Auto)  - Urine Culture; Future    3. UTI    Dip + leuk and blood  Will send for culture and treat with Macrobid  Push fluids  Call if sx worsen or fail to resolve     3. Class 3 severe obesity due to excess calories without serious comorbidity with body mass index (BMI) of 60.0 to 69.9 in adult ([facility])    - CBC Auto Differential; Future  - Comprehensive Metabolic Panel; Future  - Hemoglobin A1C; Future  - Lipid Panel;  Future    Fasting baseline labs  To follow-up with bariatrics as scheduled  Forgo sleep study at present due to upcoming weight loss surgery plans       Discussed use, benefit, and side effects of prescribed medications. All patient questions answered. Pt voiced understanding. Reviewed health maintenance. Instructed to continue current medications, diet and exercise. Patient agreedwith treatment plan. Follow up as directed.      Electronically signed by PRADIP Daniels CNP on 5/1/2019

## 2019-05-02 LAB
CULTURE: NORMAL
Lab: NORMAL
SPECIMEN DESCRIPTION: NORMAL

## 2019-09-05 ENCOUNTER — OFFICE VISIT (OUTPATIENT)
Dept: FAMILY MEDICINE CLINIC | Age: 29
End: 2019-09-05
Payer: MEDICARE

## 2019-09-05 VITALS
SYSTOLIC BLOOD PRESSURE: 120 MMHG | WEIGHT: 293 LBS | RESPIRATION RATE: 18 BRPM | HEART RATE: 65 BPM | DIASTOLIC BLOOD PRESSURE: 72 MMHG | OXYGEN SATURATION: 98 % | BODY MASS INDEX: 51.03 KG/M2

## 2019-09-05 DIAGNOSIS — Z98.84 HISTORY OF BARIATRIC SURGERY: ICD-10-CM

## 2019-09-05 DIAGNOSIS — R53.83 FATIGUE, UNSPECIFIED TYPE: ICD-10-CM

## 2019-09-05 DIAGNOSIS — K21.9 GASTROESOPHAGEAL REFLUX DISEASE, ESOPHAGITIS PRESENCE NOT SPECIFIED: Primary | ICD-10-CM

## 2019-09-05 PROCEDURE — G8417 CALC BMI ABV UP PARAM F/U: HCPCS | Performed by: NURSE PRACTITIONER

## 2019-09-05 PROCEDURE — 1036F TOBACCO NON-USER: CPT | Performed by: NURSE PRACTITIONER

## 2019-09-05 PROCEDURE — G8427 DOCREV CUR MEDS BY ELIG CLIN: HCPCS | Performed by: NURSE PRACTITIONER

## 2019-09-05 PROCEDURE — 99214 OFFICE O/P EST MOD 30 MIN: CPT | Performed by: NURSE PRACTITIONER

## 2019-09-05 RX ORDER — URSODIOL 500 MG/1
TABLET, FILM COATED ORAL
Refills: 0 | COMMUNITY
Start: 2019-08-09 | End: 2019-11-25 | Stop reason: ALTCHOICE

## 2019-09-05 RX ORDER — OMEPRAZOLE 40 MG/1
CAPSULE, DELAYED RELEASE ORAL
Refills: 0 | COMMUNITY
Start: 2019-08-23 | End: 2019-11-25 | Stop reason: ALTCHOICE

## 2019-09-05 RX ORDER — VITAMIN A, ASCORBIC ACID, VITAMIN D, .ALPHA.-TOCOPHEROL, THIAMINE MONONITRATE, RIBOFLAVIN, NIACIN, PYRIDOXINE HYDROCHLORIDE, FOLIC ACID, CYANOCOBALAMIN, CALCIUM, IRON, MAGNESIUM, ZINC, AND COPPER 2700; 70; 400; 30; 1.6; 1.8; 18; 2.5; 1; 12; 100; 65; 25; 25; 2 [IU]/1; MG/1; [IU]/1; [IU]/1; MG/1; MG/1; MG/1; MG/1; MG/1; UG/1; MG/1; MG/1; MG/1; MG/1; MG/1
TABLET ORAL
Refills: 0 | COMMUNITY
Start: 2019-08-09 | End: 2021-05-26

## 2019-09-05 RX ORDER — FAMOTIDINE 20 MG/1
20 TABLET, FILM COATED ORAL 2 TIMES DAILY
Qty: 60 TABLET | Refills: 3 | Status: SHIPPED | OUTPATIENT
Start: 2019-09-05 | End: 2019-11-25 | Stop reason: ALTCHOICE

## 2019-09-05 ASSESSMENT — ENCOUNTER SYMPTOMS
COUGH: 0
RESPIRATORY NEGATIVE: 1
ALLERGIC/IMMUNOLOGIC NEGATIVE: 1
EYES NEGATIVE: 1
TROUBLE SWALLOWING: 0
ANAL BLEEDING: 0
COLOR CHANGE: 0
CHOKING: 0
BLOOD IN STOOL: 0
VOMITING: 1
ABDOMINAL PAIN: 0
NAUSEA: 1
SORE THROAT: 0

## 2019-09-05 NOTE — PATIENT INSTRUCTIONS
lot of acid (like tomatoes and oranges), and coffee can make GERD symptoms worse in some people. If your symptoms are worse after you eat a certain food, you may want to stop eating that food to see if your symptoms get better. · Do not smoke or chew tobacco. Smoking can make GERD worse. If you need help quitting, talk to your doctor about stop-smoking programs and medicines. These can increase your chances of quitting for good. · If you have GERD symptoms at night, raise the head of your bed 6 to 8 inches by putting the frame on blocks or placing a foam wedge under the head of your mattress. (Adding extra pillows does not work.)  · Do not wear tight clothing around your middle. · Lose weight if you need to. Losing just 5 to 10 pounds can help. When should you call for help? Call your doctor now or seek immediate medical care if:    · You have new or different belly pain.     · Your stools are black and tarlike or have streaks of blood.    Watch closely for changes in your health, and be sure to contact your doctor if:    · Your symptoms have not improved after 2 days.     · Food seems to catch in your throat or chest.   Where can you learn more? Go to [email or web address]://[email or web address] Generation.SeatID. org and sign in to your [employer] account. Enter S015 in the [facility] box to learn more about \"Gastroesophageal Reflux Disease (GERD): Care Instructions. \"     If you do not have an account, please click on the \"Sign Up Now\" link. Current as of: November 7, 2018  Content Version: 12.1  © 1774-7335 Healthwise, Incorporated. Care instructions adapted under license by [facility] ([facility]). If you have questions about a medical condition or this instruction, always ask your healthcare professional. [address] any warranty or liability for your use of this information.

## 2019-09-10 ENCOUNTER — TELEPHONE (OUTPATIENT)
Dept: GASTROENTEROLOGY | Age: 29
End: 2019-09-10

## 2019-09-10 ENCOUNTER — OFFICE VISIT (OUTPATIENT)
Dept: GASTROENTEROLOGY | Age: 29
End: 2019-09-10
Payer: MEDICARE

## 2019-09-10 VITALS
WEIGHT: 293 LBS | BODY MASS INDEX: 49.87 KG/M2 | DIASTOLIC BLOOD PRESSURE: 84 MMHG | SYSTOLIC BLOOD PRESSURE: 124 MMHG | HEART RATE: 62 BPM

## 2019-09-10 DIAGNOSIS — K21.9 GASTROESOPHAGEAL REFLUX DISEASE, ESOPHAGITIS PRESENCE NOT SPECIFIED: Primary | ICD-10-CM

## 2019-09-10 PROCEDURE — G8427 DOCREV CUR MEDS BY ELIG CLIN: HCPCS | Performed by: INTERNAL MEDICINE

## 2019-09-10 PROCEDURE — G8417 CALC BMI ABV UP PARAM F/U: HCPCS | Performed by: INTERNAL MEDICINE

## 2019-09-10 PROCEDURE — 99244 OFF/OP CNSLTJ NEW/EST MOD 40: CPT | Performed by: INTERNAL MEDICINE

## 2019-09-10 ASSESSMENT — ENCOUNTER SYMPTOMS
SINUS PRESSURE: 0
RECTAL PAIN: 0
ABDOMINAL DISTENTION: 0
NAUSEA: 1
ABDOMINAL PAIN: 0
BLOOD IN STOOL: 0
CHOKING: 1
WHEEZING: 0
CONSTIPATION: 0
DIARRHEA: 0
TROUBLE SWALLOWING: 0
SORE THROAT: 0
ANAL BLEEDING: 0
BACK PAIN: 0
COUGH: 0
ALLERGIC/IMMUNOLOGIC NEGATIVE: 1
VOMITING: 1
VOICE CHANGE: 0

## 2019-09-10 NOTE — TELEPHONE ENCOUNTER
Writer attempted to contact pt by phone to see if she would like to move 9/18/19 EGD proc up to 9/11/19, but there was no answer and no msg was left.

## 2019-09-17 ENCOUNTER — ANESTHESIA EVENT (OUTPATIENT)
Dept: ENDOSCOPY | Age: 29
End: 2019-09-17
Payer: MEDICARE

## 2019-09-18 ENCOUNTER — ANESTHESIA (OUTPATIENT)
Dept: ENDOSCOPY | Age: 29
End: 2019-09-18
Payer: MEDICARE

## 2019-09-18 ENCOUNTER — HOSPITAL ENCOUNTER (OUTPATIENT)
Age: 29
Setting detail: OUTPATIENT SURGERY
Discharge: HOME | End: 2019-09-18
Attending: INTERNAL MEDICINE | Admitting: INTERNAL MEDICINE
Payer: MEDICARE

## 2019-09-18 VITALS
HEART RATE: 72 BPM | HEIGHT: 65 IN | DIASTOLIC BLOOD PRESSURE: 93 MMHG | TEMPERATURE: 97.5 F | WEIGHT: 293 LBS | OXYGEN SATURATION: 100 % | SYSTOLIC BLOOD PRESSURE: 125 MMHG | BODY MASS INDEX: 48.82 KG/M2 | RESPIRATION RATE: 20 BRPM

## 2019-09-18 VITALS
RESPIRATION RATE: 10 BRPM | SYSTOLIC BLOOD PRESSURE: 140 MMHG | DIASTOLIC BLOOD PRESSURE: 83 MMHG | OXYGEN SATURATION: 94 %

## 2019-09-18 LAB
-: NORMAL
HCG, PREGNANCY URINE (POC): NEGATIVE

## 2019-09-18 PROCEDURE — 3609012400 HC EGD TRANSORAL BIOPSY SINGLE/MULTIPLE: Performed by: INTERNAL MEDICINE

## 2019-09-18 PROCEDURE — 3700000001 HC ADD 15 MINUTES (ANESTHESIA): Performed by: INTERNAL MEDICINE

## 2019-09-18 PROCEDURE — 2709999900 HC NON-CHARGEABLE SUPPLY: Performed by: INTERNAL MEDICINE

## 2019-09-18 PROCEDURE — 88305 TISSUE EXAM BY PATHOLOGIST: CPT

## 2019-09-18 PROCEDURE — 81025 URINE PREGNANCY TEST: CPT

## 2019-09-18 PROCEDURE — 6360000002 HC RX W HCPCS

## 2019-09-18 PROCEDURE — 2500000003 HC RX 250 WO HCPCS

## 2019-09-18 PROCEDURE — 43239 EGD BIOPSY SINGLE/MULTIPLE: CPT | Performed by: INTERNAL MEDICINE

## 2019-09-18 PROCEDURE — 2580000003 HC RX 258: Performed by: ANESTHESIOLOGY

## 2019-09-18 PROCEDURE — 7100000000 HC PACU RECOVERY - FIRST 15 MIN: Performed by: INTERNAL MEDICINE

## 2019-09-18 PROCEDURE — 7100000001 HC PACU RECOVERY - ADDTL 15 MIN: Performed by: INTERNAL MEDICINE

## 2019-09-18 PROCEDURE — 3700000000 HC ANESTHESIA ATTENDED CARE: Performed by: INTERNAL MEDICINE

## 2019-09-18 PROCEDURE — 2500000003 HC RX 250 WO HCPCS: Performed by: ANESTHESIOLOGY

## 2019-09-18 RX ORDER — LIDOCAINE HYDROCHLORIDE 10 MG/ML
INJECTION, SOLUTION EPIDURAL; INFILTRATION; INTRACAUDAL; PERINEURAL PRN
Status: DISCONTINUED | OUTPATIENT
Start: 2019-09-18 | End: 2019-09-18 | Stop reason: SDUPTHER

## 2019-09-18 RX ORDER — PROPOFOL 10 MG/ML
INJECTION, EMULSION INTRAVENOUS PRN
Status: DISCONTINUED | OUTPATIENT
Start: 2019-09-18 | End: 2019-09-18 | Stop reason: SDUPTHER

## 2019-09-18 RX ORDER — SODIUM CHLORIDE, SODIUM LACTATE, POTASSIUM CHLORIDE, CALCIUM CHLORIDE 600; 310; 30; 20 MG/100ML; MG/100ML; MG/100ML; MG/100ML
INJECTION, SOLUTION INTRAVENOUS CONTINUOUS
Status: DISCONTINUED | OUTPATIENT
Start: 2019-09-18 | End: 2019-09-18 | Stop reason: HOSPADM

## 2019-09-18 RX ADMIN — PROPOFOL 300 MG: 10 INJECTION, EMULSION INTRAVENOUS at 10:12

## 2019-09-18 RX ADMIN — FAMOTIDINE 20 MG: 10 INJECTION, SOLUTION INTRAVENOUS at 09:38

## 2019-09-18 RX ADMIN — LIDOCAINE HYDROCHLORIDE 50 MG: 10 INJECTION, SOLUTION EPIDURAL; INFILTRATION; INTRACAUDAL at 10:12

## 2019-09-18 RX ADMIN — SODIUM CHLORIDE, POTASSIUM CHLORIDE, SODIUM LACTATE AND CALCIUM CHLORIDE: 600; 310; 30; 20 INJECTION, SOLUTION INTRAVENOUS at 09:08

## 2019-09-18 ASSESSMENT — PULMONARY FUNCTION TESTS
PIF_VALUE: 0
PIF_VALUE: 1
PIF_VALUE: 0
PIF_VALUE: 1
PIF_VALUE: 0

## 2019-09-18 ASSESSMENT — PAIN SCALES - GENERAL: PAINLEVEL_OUTOF10: 0

## 2019-09-18 ASSESSMENT — PAIN - FUNCTIONAL ASSESSMENT: PAIN_FUNCTIONAL_ASSESSMENT: 0-10

## 2019-09-18 NOTE — ANESTHESIA PRE PROCEDURE
Department of Anesthesiology  Preprocedure Note       Name:  Juanis Christian   Age:  33 y.o.  :  1990                                          MRN:  [id number]         Date:  2019      Surgeon: Keira Hicks):  Jose Juan Platt MD    Procedure: EGD ESOPHAGOGASTRODUODENOSCOPY (N/A Esophagus)    Medications prior to admission:   Prior to Admission medications    Medication Sig Start Date End Date Taking? Authorizing Provider   Prenatal Vit-Fe Fumarate-FA (VITAFOL-OB) TABS take 1 tablet by mouth once daily 19  Yes Historical Provider, MD   omeprazole (PRILOSEC) 40 MG delayed release capsule take 1 capsule by mouth once daily 19   Historical Provider, MD   ursodiol (ACTIGALL) 500 MG tablet take 1 tablet by mouth once daily 19   Historical Provider, MD   Cholecalciferol (DIALYVITE VITAMIN D3 MAX) 58359 units TABS Take daily for 1 month, then take weekly for 2 months 19   Historical Provider, MD   famotidine (PEPCID) 20 MG tablet Take 1 tablet by mouth 2 times daily 19   Denise Langley, APRN - CNP       Current medications:    Current Facility-Administered Medications   Medication Dose Route Frequency Provider Last Rate Last Dose    lactated ringers infusion   Intravenous Continuous Maybeury MD Amari 100 mL/hr at 19 0908         Allergies:     Allergies   Allergen Reactions    Latex Dermatitis       Problem List:    Patient Active Problem List   Diagnosis Code    H/O abnormal cervical Papanicolaou smear Z87.898    Anal warts A63.0    Rh+/RI/GBS- O09.90    Hx of LTCS x 1 Z98.891    H/O LGSIL (1/29/15) R87.622    HSV-2 infection B00.9    ASC-US with HRHPV (16) R87.610    Class 3 severe obesity due to excess calories without serious comorbidity with body mass index (BMI) of 60.0 to 69.9 in adult ([facility] [address] E66.01, Z68.44    History of bariatric surgery Z98.84    Gastroesophageal reflux disease K21.9       Past Medical History:        Diagnosis Date    Abnormal Pap smear of cervix     Anemia complicating pregnancy in second trimester 3/11/2015    Gastroesophageal reflux disease 2019       Past Surgical History:        Procedure Laterality Date     SECTION  07/20/15    GASTRIC BAND  2019    GASTRIC SLEEVE       Social History:    Social History     Tobacco Use    Smoking status: Never Smoker    Smokeless tobacco: Never Used   Substance Use Topics    Alcohol use: No                                Counseling given: Not Answered      Vital Signs (Current):   Vitals:    19 0848   BP: 116/74   Pulse: 64   Resp: 16   Temp: 98.1 °F (36.7 °C)   TempSrc: Oral   SpO2: 99%   Weight: (!) 304 lb (137.9 kg)   Height: 5' 5\" (1.651 m)                                              BP Readings from Last 3 Encounters:   19 116/74   09/10/19 124/84   19 120/72       NPO Status: Time of last liquid consumption:                         Time of last solid consumption:                         Date of last liquid consumption: 19                        Date of last solid food consumption: 19    BMI:   Wt Readings from Last 3 Encounters:   19 (!) 304 lb (137.9 kg)   09/10/19 (!) 304 lb 4.8 oz (138 kg)   19 (!) 311 lb 6.4 oz (141.3 kg)     Body mass index is 50.59 kg/m². CBC:   Lab Results   Component Value Date    WBC 10.4 2019    RBC 4.55 2019    HGB 11.0 2019    HCT 35.0 2019    MCV 76.9 2019    RDW 16.9 2019     2019       CMP:   Lab Results   Component Value Date     2019    K 4.0 2019     2019    CO2 23 2019    BUN 14 2019    CREATININE 0.72 2019    GFRAA >60 2019    LABGLOM >60 2019    GLUCOSE 76 2019    CALCIUM 9.4 2019       POC Tests: No results for input(s): POCGLU, POCNA, POCK, POCCL, POCBUN, POCHEMO, POCHCT in the last 72 hours.     Coags: No results found for: PROTIME, INR, APTT    HCG (If

## 2019-09-18 NOTE — OP NOTE
ESOPHAGOGASTRODUODENOSCOPY   ( EGD )  DATE OF PROCEDURE: 9/18/2019     SURGEON: Gilbert Garzon MD    ASSISTANT: None    PREOPERATIVE DIAGNOSIS: Persistent heartburns, intermittent dysphagia. Procedure performed to evaluate upper GI lesions    POSTOPERATIVE DIAGNOSIS: No gross pathology seen that can account patient's symptoms    OPERATION: Upper GI endoscopy with Biopsy    ANESTHESIA: MAC    ESTIMATED BLOOD LOSS: None    COMPLICATIONS: None. SPECIMENS:  Was Obtained: Random biopsies from the body of the esophagus to evaluate microscopic disease rule out eosinophilic esophagitis    HISTORY: The patient is a 33y.o. year old female with history of above preop diagnosis. I recommended esophagogastroduodenoscopy with possible biopsy and I explained the risk, benefits, expected outcome, and alternatives to the procedure. Risks included but are not limited to bleeding, infection, respiratory distress, hypotension, and perforation of the esophagus, stomach, or duodenum. Patient understands and is in agreement. PROCEDURE: The patient was given IV conscious sedation. The patient's SPO2 remained above 90% throughout the procedure. Cetacaine spray given. Patient placed in left lateral position. Olympus  videogastroscope was inserted orally under vision into the esophagus without difficulty and advanced into the stomach then through the pylorus up to the second part of duodenum. Findings:    Retropharyngeal area was grossly normal appearing    Esophagus: normal.  No signs of peptic esophagitis. No Mojica's mucosa no stricture seen. Squamocolumnar junction is at about 35 cm. No hiatal hernia seen. 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 Patient had tubular stomach.     Mucosa in the tubular stomach, antrum, pylorus normal.    Duodenum:

## 2019-09-19 LAB — SURGICAL PATHOLOGY REPORT: NORMAL

## 2019-09-25 ENCOUNTER — TELEPHONE (OUTPATIENT)
Dept: GASTROENTEROLOGY | Age: 29
End: 2019-09-25

## 2019-10-12 ENCOUNTER — APPOINTMENT (OUTPATIENT)
Dept: CT IMAGING | Age: 29
End: 2019-10-12
Payer: MEDICARE

## 2019-10-12 ENCOUNTER — HOSPITAL ENCOUNTER (OUTPATIENT)
Age: 29
Setting detail: OBSERVATION
Discharge: HOME | End: 2019-10-14
Attending: EMERGENCY MEDICINE | Admitting: INTERNAL MEDICINE
Payer: MEDICARE

## 2019-10-12 DIAGNOSIS — R11.2 INTRACTABLE VOMITING WITH NAUSEA, UNSPECIFIED VOMITING TYPE: Primary | ICD-10-CM

## 2019-10-12 DIAGNOSIS — R10.30 LOWER ABDOMINAL PAIN: ICD-10-CM

## 2019-10-12 PROBLEM — R11.10 INTRACTABLE VOMITING: Status: ACTIVE | Noted: 2019-10-12

## 2019-10-12 LAB
-: ABNORMAL
ABSOLUTE EOS #: 0.1 K/UL (ref 0–0.4)
ABSOLUTE IMMATURE GRANULOCYTE: ABNORMAL K/UL (ref 0–0.3)
ABSOLUTE LYMPH #: 2.5 K/UL (ref 1–4.8)
ABSOLUTE MONO #: 0.6 K/UL (ref 0.1–1.3)
ALBUMIN SERPL-MCNC: 4.1 G/DL (ref 3.5–5.2)
ALBUMIN/GLOBULIN RATIO: ABNORMAL (ref 1–2.5)
ALP BLD-CCNC: 96 U/L (ref 35–104)
ALT SERPL-CCNC: 26 U/L (ref 5–33)
AMORPHOUS: ABNORMAL
ANION GAP SERPL CALCULATED.3IONS-SCNC: 22 MMOL/L (ref 9–17)
AST SERPL-CCNC: 25 U/L
BACTERIA: ABNORMAL
BASOPHILS # BLD: 1 % (ref 0–2)
BASOPHILS ABSOLUTE: 0.1 K/UL (ref 0–0.2)
BILIRUB SERPL-MCNC: 0.87 MG/DL (ref 0.3–1.2)
BILIRUBIN URINE: ABNORMAL
BUN BLDV-MCNC: 10 MG/DL (ref 6–20)
BUN/CREAT BLD: ABNORMAL (ref 9–20)
CALCIUM SERPL-MCNC: 9.6 MG/DL (ref 8.6–10.4)
CASTS UA: ABNORMAL /LPF
CHLORIDE BLD-SCNC: 96 MMOL/L (ref 98–107)
CO2: 21 MMOL/L (ref 20–31)
COLOR: ABNORMAL
COMMENT UA: ABNORMAL
CREAT SERPL-MCNC: 0.74 MG/DL (ref 0.5–0.9)
CRYSTALS, UA: ABNORMAL /HPF
DIFFERENTIAL TYPE: ABNORMAL
DIRECT EXAM: NORMAL
EOSINOPHILS RELATIVE PERCENT: 1 % (ref 0–4)
EPITHELIAL CELLS UA: ABNORMAL /HPF
GFR AFRICAN AMERICAN: >60 ML/MIN
GFR NON-AFRICAN AMERICAN: >60 ML/MIN
GFR SERPL CREATININE-BSD FRML MDRD: ABNORMAL ML/MIN/{1.73_M2}
GFR SERPL CREATININE-BSD FRML MDRD: ABNORMAL ML/MIN/{1.73_M2}
GLUCOSE BLD-MCNC: 75 MG/DL (ref 70–99)
GLUCOSE URINE: NEGATIVE
HCG(URINE) PREGNANCY TEST: NEGATIVE
HCT VFR BLD CALC: 38.4 % (ref 36–46)
HEMOGLOBIN: 12.2 G/DL (ref 12–16)
IMMATURE GRANULOCYTES: ABNORMAL %
KETONES, URINE: ABNORMAL
LEUKOCYTE ESTERASE, URINE: ABNORMAL
LIPASE: 40 U/L (ref 13–60)
LYMPHOCYTES # BLD: 27 % (ref 24–44)
Lab: NORMAL
MAGNESIUM: 2 MG/DL (ref 1.6–2.6)
MCH RBC QN AUTO: 26.1 PG (ref 26–34)
MCHC RBC AUTO-ENTMCNC: 31.6 G/DL (ref 31–37)
MCV RBC AUTO: 82.4 FL (ref 80–100)
MONOCYTES # BLD: 6 % (ref 1–7)
MUCUS: ABNORMAL
NITRITE, URINE: POSITIVE
NRBC AUTOMATED: ABNORMAL PER 100 WBC
OTHER OBSERVATIONS UA: ABNORMAL
PDW BLD-RTO: 22.3 % (ref 11.5–14.9)
PH UA: 6 (ref 5–8)
PLATELET # BLD: 226 K/UL (ref 150–450)
PLATELET ESTIMATE: ABNORMAL
PMV BLD AUTO: 9.3 FL (ref 6–12)
POTASSIUM SERPL-SCNC: 3.5 MMOL/L (ref 3.7–5.3)
PROTEIN UA: ABNORMAL
RBC # BLD: 4.66 M/UL (ref 4–5.2)
RBC # BLD: ABNORMAL 10*6/UL
RBC UA: ABNORMAL /HPF
RENAL EPITHELIAL, UA: ABNORMAL /HPF
SEG NEUTROPHILS: 65 % (ref 36–66)
SEGMENTED NEUTROPHILS ABSOLUTE COUNT: 6.1 K/UL (ref 1.3–9.1)
SODIUM BLD-SCNC: 139 MMOL/L (ref 135–144)
SPECIFIC GRAVITY UA: 1.03 (ref 1–1.03)
SPECIMEN DESCRIPTION: NORMAL
TOTAL PROTEIN: 9.2 G/DL (ref 6.4–8.3)
TRICHOMONAS: ABNORMAL
TURBIDITY: ABNORMAL
URINE HGB: ABNORMAL
UROBILINOGEN, URINE: ABNORMAL
WBC # BLD: 9.3 K/UL (ref 3.5–11)
WBC # BLD: ABNORMAL 10*3/UL
WBC UA: ABNORMAL /HPF
YEAST: ABNORMAL

## 2019-10-12 PROCEDURE — 36415 COLL VENOUS BLD VENIPUNCTURE: CPT

## 2019-10-12 PROCEDURE — 2580000003 HC RX 258: Performed by: EMERGENCY MEDICINE

## 2019-10-12 PROCEDURE — 87480 CANDIDA DNA DIR PROBE: CPT

## 2019-10-12 PROCEDURE — 87491 CHLMYD TRACH DNA AMP PROBE: CPT

## 2019-10-12 PROCEDURE — G0378 HOSPITAL OBSERVATION PER HR: HCPCS

## 2019-10-12 PROCEDURE — 6360000002 HC RX W HCPCS: Performed by: STUDENT IN AN ORGANIZED HEALTH CARE EDUCATION/TRAINING PROGRAM

## 2019-10-12 PROCEDURE — 87660 TRICHOMONAS VAGIN DIR PROBE: CPT

## 2019-10-12 PROCEDURE — 99285 EMERGENCY DEPT VISIT HI MDM: CPT

## 2019-10-12 PROCEDURE — 96374 THER/PROPH/DIAG INJ IV PUSH: CPT

## 2019-10-12 PROCEDURE — 81001 URINALYSIS AUTO W/SCOPE: CPT

## 2019-10-12 PROCEDURE — 6370000000 HC RX 637 (ALT 250 FOR IP): Performed by: STUDENT IN AN ORGANIZED HEALTH CARE EDUCATION/TRAINING PROGRAM

## 2019-10-12 PROCEDURE — 85025 COMPLETE CBC W/AUTO DIFF WBC: CPT

## 2019-10-12 PROCEDURE — 6360000004 HC RX CONTRAST MEDICATION: Performed by: STUDENT IN AN ORGANIZED HEALTH CARE EDUCATION/TRAINING PROGRAM

## 2019-10-12 PROCEDURE — 2500000003 HC RX 250 WO HCPCS: Performed by: STUDENT IN AN ORGANIZED HEALTH CARE EDUCATION/TRAINING PROGRAM

## 2019-10-12 PROCEDURE — 2580000003 HC RX 258: Performed by: STUDENT IN AN ORGANIZED HEALTH CARE EDUCATION/TRAINING PROGRAM

## 2019-10-12 PROCEDURE — 87510 GARDNER VAG DNA DIR PROBE: CPT

## 2019-10-12 PROCEDURE — 87591 N.GONORRHOEAE DNA AMP PROB: CPT

## 2019-10-12 PROCEDURE — 83735 ASSAY OF MAGNESIUM: CPT

## 2019-10-12 PROCEDURE — 96375 TX/PRO/DX INJ NEW DRUG ADDON: CPT

## 2019-10-12 PROCEDURE — 74177 CT ABD & PELVIS W/CONTRAST: CPT

## 2019-10-12 PROCEDURE — 80053 COMPREHEN METABOLIC PANEL: CPT

## 2019-10-12 PROCEDURE — 81025 URINE PREGNANCY TEST: CPT

## 2019-10-12 PROCEDURE — 83690 ASSAY OF LIPASE: CPT

## 2019-10-12 RX ORDER — MAGNESIUM HYDROXIDE/ALUMINUM HYDROXICE/SIMETHICONE 120; 1200; 1200 MG/30ML; MG/30ML; MG/30ML
30 SUSPENSION ORAL ONCE
Status: COMPLETED | OUTPATIENT
Start: 2019-10-12 | End: 2019-10-12

## 2019-10-12 RX ORDER — SODIUM CHLORIDE 0.9 % (FLUSH) 0.9 %
10 SYRINGE (ML) INJECTION PRN
Status: DISCONTINUED | OUTPATIENT
Start: 2019-10-12 | End: 2019-10-13

## 2019-10-12 RX ORDER — 0.9 % SODIUM CHLORIDE 0.9 %
80 INTRAVENOUS SOLUTION INTRAVENOUS ONCE
Status: COMPLETED | OUTPATIENT
Start: 2019-10-12 | End: 2019-10-12

## 2019-10-12 RX ORDER — 0.9 % SODIUM CHLORIDE 0.9 %
1000 INTRAVENOUS SOLUTION INTRAVENOUS ONCE
Status: COMPLETED | OUTPATIENT
Start: 2019-10-12 | End: 2019-10-13

## 2019-10-12 RX ORDER — 0.9 % SODIUM CHLORIDE 0.9 %
1000 INTRAVENOUS SOLUTION INTRAVENOUS ONCE
Status: COMPLETED | OUTPATIENT
Start: 2019-10-12 | End: 2019-10-12

## 2019-10-12 RX ORDER — ONDANSETRON 2 MG/ML
4 INJECTION INTRAMUSCULAR; INTRAVENOUS ONCE
Status: COMPLETED | OUTPATIENT
Start: 2019-10-12 | End: 2019-10-12

## 2019-10-12 RX ORDER — SODIUM CHLORIDE, SODIUM LACTATE, POTASSIUM CHLORIDE, CALCIUM CHLORIDE 600; 310; 30; 20 MG/100ML; MG/100ML; MG/100ML; MG/100ML
INJECTION, SOLUTION INTRAVENOUS CONTINUOUS
Status: DISCONTINUED | OUTPATIENT
Start: 2019-10-12 | End: 2019-10-14 | Stop reason: HOSPADM

## 2019-10-12 RX ADMIN — FAMOTIDINE 20 MG: 10 INJECTION, SOLUTION INTRAVENOUS at 19:16

## 2019-10-12 RX ADMIN — ALUMINUM HYDROXIDE, MAGNESIUM HYDROXIDE, AND SIMETHICONE 30 ML: 200; 200; 20 SUSPENSION ORAL at 19:16

## 2019-10-12 RX ADMIN — SODIUM CHLORIDE 1000 ML: 9 INJECTION, SOLUTION INTRAVENOUS at 21:00

## 2019-10-12 RX ADMIN — SODIUM CHLORIDE 80 ML: 9 INJECTION, SOLUTION INTRAVENOUS at 19:30

## 2019-10-12 RX ADMIN — Medication 10 ML: at 19:30

## 2019-10-12 RX ADMIN — IOVERSOL 75 ML: 741 INJECTION INTRA-ARTERIAL; INTRAVENOUS at 19:30

## 2019-10-12 RX ADMIN — ONDANSETRON 4 MG: 2 INJECTION INTRAMUSCULAR; INTRAVENOUS at 19:16

## 2019-10-12 RX ADMIN — SODIUM CHLORIDE 1000 ML: 9 INJECTION, SOLUTION INTRAVENOUS at 19:10

## 2019-10-12 ASSESSMENT — ENCOUNTER SYMPTOMS
CONSTIPATION: 1
NAUSEA: 1
ABDOMINAL PAIN: 1
BACK PAIN: 0
TROUBLE SWALLOWING: 0
SHORTNESS OF BREATH: 0
VOMITING: 1
SORE THROAT: 0

## 2019-10-12 ASSESSMENT — PAIN SCALES - GENERAL: PAINLEVEL_OUTOF10: 8

## 2019-10-13 LAB
ABSOLUTE EOS #: 0.08 K/UL (ref 0–0.4)
ABSOLUTE IMMATURE GRANULOCYTE: ABNORMAL K/UL (ref 0–0.3)
ABSOLUTE LYMPH #: 2.4 K/UL (ref 1–4.8)
ABSOLUTE MONO #: 0.72 K/UL (ref 0.1–1.3)
ALBUMIN SERPL-MCNC: 3.1 G/DL (ref 3.5–5.2)
ALBUMIN/GLOBULIN RATIO: ABNORMAL (ref 1–2.5)
ALP BLD-CCNC: 71 U/L (ref 35–104)
ALT SERPL-CCNC: 18 U/L (ref 5–33)
ANION GAP SERPL CALCULATED.3IONS-SCNC: 15 MMOL/L (ref 9–17)
AST SERPL-CCNC: 17 U/L
BASOPHILS # BLD: 1 % (ref 0–2)
BASOPHILS ABSOLUTE: 0.08 K/UL (ref 0–0.2)
BILIRUB SERPL-MCNC: 0.6 MG/DL (ref 0.3–1.2)
BUN BLDV-MCNC: 7 MG/DL (ref 6–20)
BUN/CREAT BLD: ABNORMAL (ref 9–20)
CALCIUM SERPL-MCNC: 8.1 MG/DL (ref 8.6–10.4)
CHLORIDE BLD-SCNC: 105 MMOL/L (ref 98–107)
CO2: 20 MMOL/L (ref 20–31)
CREAT SERPL-MCNC: 0.63 MG/DL (ref 0.5–0.9)
DIFFERENTIAL TYPE: ABNORMAL
EOSINOPHILS RELATIVE PERCENT: 1 % (ref 0–4)
GFR AFRICAN AMERICAN: >60 ML/MIN
GFR NON-AFRICAN AMERICAN: >60 ML/MIN
GFR SERPL CREATININE-BSD FRML MDRD: ABNORMAL ML/MIN/{1.73_M2}
GFR SERPL CREATININE-BSD FRML MDRD: ABNORMAL ML/MIN/{1.73_M2}
GLUCOSE BLD-MCNC: 70 MG/DL (ref 70–99)
HCT VFR BLD CALC: 31.8 % (ref 36–46)
HEMOGLOBIN: 10.1 G/DL (ref 12–16)
IMMATURE GRANULOCYTES: ABNORMAL %
LYMPHOCYTES # BLD: 30 % (ref 24–44)
MCH RBC QN AUTO: 25.9 PG (ref 26–34)
MCHC RBC AUTO-ENTMCNC: 31.6 G/DL (ref 31–37)
MCV RBC AUTO: 81.9 FL (ref 80–100)
MONOCYTES # BLD: 9 % (ref 1–7)
MORPHOLOGY: ABNORMAL
MORPHOLOGY: ABNORMAL
NRBC AUTOMATED: ABNORMAL PER 100 WBC
PDW BLD-RTO: 22.1 % (ref 11.5–14.9)
PLATELET # BLD: 189 K/UL (ref 150–450)
PLATELET ESTIMATE: ABNORMAL
PMV BLD AUTO: 9.2 FL (ref 6–12)
POTASSIUM SERPL-SCNC: 3.5 MMOL/L (ref 3.7–5.3)
RBC # BLD: 3.88 M/UL (ref 4–5.2)
RBC # BLD: ABNORMAL 10*6/UL
SEG NEUTROPHILS: 59 % (ref 36–66)
SEGMENTED NEUTROPHILS ABSOLUTE COUNT: 4.72 K/UL (ref 1.3–9.1)
SODIUM BLD-SCNC: 140 MMOL/L (ref 135–144)
TOTAL PROTEIN: 7.1 G/DL (ref 6.4–8.3)
WBC # BLD: 8 K/UL (ref 3.5–11)
WBC # BLD: ABNORMAL 10*3/UL

## 2019-10-13 PROCEDURE — 99254 IP/OBS CNSLTJ NEW/EST MOD 60: CPT | Performed by: INTERNAL MEDICINE

## 2019-10-13 PROCEDURE — 36415 COLL VENOUS BLD VENIPUNCTURE: CPT

## 2019-10-13 PROCEDURE — 96376 TX/PRO/DX INJ SAME DRUG ADON: CPT

## 2019-10-13 PROCEDURE — 2580000003 HC RX 258: Performed by: STUDENT IN AN ORGANIZED HEALTH CARE EDUCATION/TRAINING PROGRAM

## 2019-10-13 PROCEDURE — 6370000000 HC RX 637 (ALT 250 FOR IP): Performed by: STUDENT IN AN ORGANIZED HEALTH CARE EDUCATION/TRAINING PROGRAM

## 2019-10-13 PROCEDURE — G0378 HOSPITAL OBSERVATION PER HR: HCPCS

## 2019-10-13 PROCEDURE — 80053 COMPREHEN METABOLIC PANEL: CPT

## 2019-10-13 PROCEDURE — 6360000002 HC RX W HCPCS: Performed by: STUDENT IN AN ORGANIZED HEALTH CARE EDUCATION/TRAINING PROGRAM

## 2019-10-13 PROCEDURE — 85025 COMPLETE CBC W/AUTO DIFF WBC: CPT

## 2019-10-13 PROCEDURE — 99220 PR INITIAL OBSERVATION CARE/DAY 70 MINUTES: CPT | Performed by: INTERNAL MEDICINE

## 2019-10-13 RX ORDER — SODIUM CHLORIDE 0.9 % (FLUSH) 0.9 %
10 SYRINGE (ML) INJECTION EVERY 12 HOURS SCHEDULED
Status: DISCONTINUED | OUTPATIENT
Start: 2019-10-13 | End: 2019-10-14 | Stop reason: HOSPADM

## 2019-10-13 RX ORDER — RANITIDINE HCL 75 MG
100 TABLET ORAL NIGHTLY
Status: DISCONTINUED | OUTPATIENT
Start: 2019-10-13 | End: 2019-10-13 | Stop reason: SDUPTHER

## 2019-10-13 RX ORDER — ACETAMINOPHEN 325 MG/1
650 TABLET ORAL EVERY 4 HOURS PRN
Status: DISCONTINUED | OUTPATIENT
Start: 2019-10-13 | End: 2019-10-14 | Stop reason: HOSPADM

## 2019-10-13 RX ORDER — PANTOPRAZOLE SODIUM 40 MG/1
40 TABLET, DELAYED RELEASE ORAL
Status: DISCONTINUED | OUTPATIENT
Start: 2019-10-13 | End: 2019-10-14 | Stop reason: HOSPADM

## 2019-10-13 RX ORDER — SODIUM CHLORIDE 9 MG/ML
INJECTION, SOLUTION INTRAVENOUS CONTINUOUS
Status: DISCONTINUED | OUTPATIENT
Start: 2019-10-13 | End: 2019-10-14

## 2019-10-13 RX ORDER — SODIUM CHLORIDE 0.9 % (FLUSH) 0.9 %
10 SYRINGE (ML) INJECTION PRN
Status: DISCONTINUED | OUTPATIENT
Start: 2019-10-13 | End: 2019-10-14 | Stop reason: HOSPADM

## 2019-10-13 RX ORDER — FAMOTIDINE 20 MG/1
20 TABLET, FILM COATED ORAL 2 TIMES DAILY
Status: DISCONTINUED | OUTPATIENT
Start: 2019-10-13 | End: 2019-10-14 | Stop reason: HOSPADM

## 2019-10-13 RX ORDER — URSODIOL 250 MG/1
500 TABLET, FILM COATED ORAL DAILY
Status: DISCONTINUED | OUTPATIENT
Start: 2019-10-13 | End: 2019-10-14 | Stop reason: HOSPADM

## 2019-10-13 RX ORDER — ONDANSETRON 2 MG/ML
4 INJECTION INTRAMUSCULAR; INTRAVENOUS EVERY 8 HOURS PRN
Status: DISCONTINUED | OUTPATIENT
Start: 2019-10-13 | End: 2019-10-14 | Stop reason: HOSPADM

## 2019-10-13 RX ADMIN — SODIUM CHLORIDE: 9 INJECTION, SOLUTION INTRAVENOUS at 02:13

## 2019-10-13 RX ADMIN — URSODIOL 500 MG: 250 TABLET, FILM COATED ORAL at 17:59

## 2019-10-13 RX ADMIN — FAMOTIDINE 20 MG: 20 TABLET ORAL at 09:21

## 2019-10-13 RX ADMIN — FAMOTIDINE 20 MG: 20 TABLET ORAL at 20:59

## 2019-10-13 RX ADMIN — Medication 10 ML: at 09:22

## 2019-10-13 RX ADMIN — ONDANSETRON 4 MG: 2 INJECTION INTRAMUSCULAR; INTRAVENOUS at 18:00

## 2019-10-13 RX ADMIN — FAMOTIDINE 20 MG: 20 TABLET ORAL at 02:13

## 2019-10-13 RX ADMIN — ONDANSETRON 4 MG: 2 INJECTION INTRAMUSCULAR; INTRAVENOUS at 09:21

## 2019-10-13 ASSESSMENT — PAIN SCALES - GENERAL: PAINLEVEL_OUTOF10: 0

## 2019-10-14 VITALS
HEART RATE: 59 BPM | HEIGHT: 66 IN | DIASTOLIC BLOOD PRESSURE: 82 MMHG | RESPIRATION RATE: 16 BRPM | TEMPERATURE: 98.3 F | SYSTOLIC BLOOD PRESSURE: 133 MMHG | BODY MASS INDEX: 44.2 KG/M2 | WEIGHT: 275 LBS | OXYGEN SATURATION: 100 %

## 2019-10-14 PROBLEM — R11.10 INTRACTABLE VOMITING: Status: RESOLVED | Noted: 2019-10-12 | Resolved: 2019-10-14

## 2019-10-14 LAB
C TRACH DNA GENITAL QL NAA+PROBE: NEGATIVE
N. GONORRHOEAE DNA: NEGATIVE
SPECIMEN DESCRIPTION: NORMAL

## 2019-10-14 PROCEDURE — 99239 HOSP IP/OBS DSCHRG MGMT >30: CPT | Performed by: INTERNAL MEDICINE

## 2019-10-14 PROCEDURE — 6360000002 HC RX W HCPCS: Performed by: INTERNAL MEDICINE

## 2019-10-14 PROCEDURE — 6370000000 HC RX 637 (ALT 250 FOR IP): Performed by: STUDENT IN AN ORGANIZED HEALTH CARE EDUCATION/TRAINING PROGRAM

## 2019-10-14 PROCEDURE — 99232 SBSQ HOSP IP/OBS MODERATE 35: CPT | Performed by: INTERNAL MEDICINE

## 2019-10-14 PROCEDURE — 96372 THER/PROPH/DIAG INJ SC/IM: CPT

## 2019-10-14 PROCEDURE — G0378 HOSPITAL OBSERVATION PER HR: HCPCS

## 2019-10-14 RX ORDER — CYANOCOBALAMIN 1000 UG/ML
1000 INJECTION INTRAMUSCULAR; SUBCUTANEOUS ONCE
Status: COMPLETED | OUTPATIENT
Start: 2019-10-14 | End: 2019-10-14

## 2019-10-14 RX ADMIN — PANTOPRAZOLE SODIUM 40 MG: 40 TABLET, DELAYED RELEASE ORAL at 06:22

## 2019-10-14 RX ADMIN — URSODIOL 500 MG: 250 TABLET, FILM COATED ORAL at 11:41

## 2019-10-14 RX ADMIN — ENOXAPARIN SODIUM 30 MG: 30 INJECTION SUBCUTANEOUS at 09:32

## 2019-10-14 RX ADMIN — FAMOTIDINE 20 MG: 20 TABLET ORAL at 09:32

## 2019-10-14 RX ADMIN — CYANOCOBALAMIN 1000 MCG: 1000 INJECTION, SOLUTION INTRAMUSCULAR at 14:42

## 2019-10-14 ASSESSMENT — PAIN SCALES - GENERAL
PAINLEVEL_OUTOF10: 0
PAINLEVEL_OUTOF10: 0

## 2019-10-15 ENCOUNTER — TELEPHONE (OUTPATIENT)
Dept: FAMILY MEDICINE CLINIC | Age: 29
End: 2019-10-15

## 2019-10-29 ENCOUNTER — OFFICE VISIT (OUTPATIENT)
Dept: OBGYN CLINIC | Age: 29
End: 2019-10-29
Payer: MEDICARE

## 2019-10-29 ENCOUNTER — HOSPITAL ENCOUNTER (OUTPATIENT)
Age: 29
Setting detail: SPECIMEN
Discharge: HOME | End: 2019-10-29
Payer: MEDICARE

## 2019-10-29 VITALS
BODY MASS INDEX: 43.23 KG/M2 | SYSTOLIC BLOOD PRESSURE: 126 MMHG | WEIGHT: 269 LBS | DIASTOLIC BLOOD PRESSURE: 82 MMHG | HEIGHT: 66 IN

## 2019-10-29 DIAGNOSIS — Z12.4 SCREENING FOR CERVICAL CANCER: ICD-10-CM

## 2019-10-29 DIAGNOSIS — Z01.419 VISIT FOR GYNECOLOGIC EXAMINATION: Primary | ICD-10-CM

## 2019-10-29 DIAGNOSIS — Z01.419 VISIT FOR GYNECOLOGIC EXAMINATION: ICD-10-CM

## 2019-10-29 PROCEDURE — 99395 PREV VISIT EST AGE 18-39: CPT | Performed by: NURSE PRACTITIONER

## 2019-10-29 PROCEDURE — 87624 HPV HI-RISK TYP POOLED RSLT: CPT

## 2019-10-29 PROCEDURE — G0145 SCR C/V CYTO,THINLAYER,RESCR: HCPCS

## 2019-10-29 PROCEDURE — G8484 FLU IMMUNIZE NO ADMIN: HCPCS | Performed by: NURSE PRACTITIONER

## 2019-10-29 ASSESSMENT — PATIENT HEALTH QUESTIONNAIRE - PHQ9
SUM OF ALL RESPONSES TO PHQ9 QUESTIONS 1 & 2: 0
1. LITTLE INTEREST OR PLEASURE IN DOING THINGS: 0
SUM OF ALL RESPONSES TO PHQ QUESTIONS 1-9: 0
2. FEELING DOWN, DEPRESSED OR HOPELESS: 0
SUM OF ALL RESPONSES TO PHQ QUESTIONS 1-9: 0

## 2019-11-04 LAB — CYTOLOGY REPORT: NORMAL

## 2019-11-07 LAB
HPV SAMPLE: NORMAL
HPV, GENOTYPE 16: NOT DETECTED
HPV, GENOTYPE 18: NOT DETECTED
HPV, HIGH RISK OTHER: NOT DETECTED
HPV, INTERPRETATION: NORMAL
SPECIMEN DESCRIPTION: NORMAL

## 2019-11-11 ENCOUNTER — TELEPHONE (OUTPATIENT)
Dept: OBGYN CLINIC | Age: 29
End: 2019-11-11

## 2019-11-12 ENCOUNTER — TELEPHONE (OUTPATIENT)
Dept: OBGYN CLINIC | Age: 29
End: 2019-11-12

## 2019-11-20 ENCOUNTER — TELEPHONE (OUTPATIENT)
Dept: OBGYN CLINIC | Age: 29
End: 2019-11-20

## 2019-11-25 ENCOUNTER — OFFICE VISIT (OUTPATIENT)
Dept: FAMILY MEDICINE CLINIC | Age: 29
End: 2019-11-25
Payer: MEDICARE

## 2019-11-25 VITALS
BODY MASS INDEX: 44.42 KG/M2 | TEMPERATURE: 98.1 F | OXYGEN SATURATION: 98 % | SYSTOLIC BLOOD PRESSURE: 122 MMHG | WEIGHT: 275.2 LBS | DIASTOLIC BLOOD PRESSURE: 84 MMHG | HEART RATE: 71 BPM

## 2019-11-25 DIAGNOSIS — Z98.84 S/P BARIATRIC SURGERY: ICD-10-CM

## 2019-11-25 DIAGNOSIS — E53.8 FOLATE DEFICIENCY: ICD-10-CM

## 2019-11-25 DIAGNOSIS — R20.2 TINGLING OF BOTH FEET: Primary | ICD-10-CM

## 2019-11-25 LAB — HBA1C MFR BLD: 4.8 %

## 2019-11-25 PROCEDURE — G8482 FLU IMMUNIZE ORDER/ADMIN: HCPCS | Performed by: NURSE PRACTITIONER

## 2019-11-25 PROCEDURE — 99214 OFFICE O/P EST MOD 30 MIN: CPT | Performed by: NURSE PRACTITIONER

## 2019-11-25 PROCEDURE — 83036 HEMOGLOBIN GLYCOSYLATED A1C: CPT | Performed by: NURSE PRACTITIONER

## 2019-11-25 PROCEDURE — G8417 CALC BMI ABV UP PARAM F/U: HCPCS | Performed by: NURSE PRACTITIONER

## 2019-11-25 PROCEDURE — G8427 DOCREV CUR MEDS BY ELIG CLIN: HCPCS | Performed by: NURSE PRACTITIONER

## 2019-11-25 PROCEDURE — 1036F TOBACCO NON-USER: CPT | Performed by: NURSE PRACTITIONER

## 2019-11-25 ASSESSMENT — ENCOUNTER SYMPTOMS
SORE THROAT: 0
COUGH: 0
DIARRHEA: 0
SHORTNESS OF BREATH: 0
GASTROINTESTINAL NEGATIVE: 1
EYES NEGATIVE: 1
NAUSEA: 0
WHEEZING: 0
COLOR CHANGE: 0
VOMITING: 0
TROUBLE SWALLOWING: 0
BACK PAIN: 0
RESPIRATORY NEGATIVE: 1

## 2019-12-04 ENCOUNTER — TELEPHONE (OUTPATIENT)
Dept: FAMILY MEDICINE CLINIC | Age: 29
End: 2019-12-04

## 2020-02-10 ENCOUNTER — TELEPHONE (OUTPATIENT)
Dept: OBGYN CLINIC | Age: 30
End: 2020-02-10

## 2020-02-10 RX ORDER — FLUCONAZOLE 150 MG/1
150 TABLET ORAL ONCE
Qty: 2 TABLET | Refills: 0 | Status: SHIPPED | OUTPATIENT
Start: 2020-02-10 | End: 2021-04-28 | Stop reason: SDUPTHER

## 2020-02-10 RX ORDER — METRONIDAZOLE 7.5 MG/G
GEL VAGINAL
Qty: 1 TUBE | Refills: 0 | Status: SHIPPED | OUTPATIENT
Start: 2020-02-10 | End: 2020-02-17

## 2020-05-01 ENCOUNTER — NURSE TRIAGE (OUTPATIENT)
Dept: OTHER | Facility: CLINIC | Age: 30
End: 2020-05-01

## 2020-05-01 NOTE — TELEPHONE ENCOUNTER
Recommend cessation of caffeine and avoidance of alcohol, f/u if symptoms persist
given. Will transfer to [facility] for scheduling. Informed when to call nurse back.

## 2020-05-08 ENCOUNTER — TELEMEDICINE (OUTPATIENT)
Dept: FAMILY MEDICINE CLINIC | Age: 30
End: 2020-05-08
Payer: MEDICARE

## 2020-05-08 PROCEDURE — 99213 OFFICE O/P EST LOW 20 MIN: CPT | Performed by: NURSE PRACTITIONER

## 2020-05-08 PROCEDURE — G8427 DOCREV CUR MEDS BY ELIG CLIN: HCPCS | Performed by: NURSE PRACTITIONER

## 2020-05-08 RX ORDER — FAMOTIDINE 20 MG/1
20 TABLET, FILM COATED ORAL 2 TIMES DAILY PRN
Qty: 60 TABLET | Refills: 5 | Status: SHIPPED | OUTPATIENT
Start: 2020-05-08 | End: 2020-12-09

## 2020-05-08 RX ORDER — HYDROXYZINE HYDROCHLORIDE 25 MG/1
25 TABLET, FILM COATED ORAL 3 TIMES DAILY PRN
Qty: 90 TABLET | Refills: 0 | Status: SHIPPED | OUTPATIENT
Start: 2020-05-08 | End: 2020-05-18

## 2020-05-08 ASSESSMENT — ENCOUNTER SYMPTOMS
SINUS PAIN: 0
SINUS PRESSURE: 0
NAUSEA: 0
TROUBLE SWALLOWING: 0
GASTROINTESTINAL NEGATIVE: 1
STRIDOR: 0
CHOKING: 0
SHORTNESS OF BREATH: 1
COUGH: 0
SORE THROAT: 1
ALLERGIC/IMMUNOLOGIC NEGATIVE: 1
CHEST TIGHTNESS: 0
WHEEZING: 0
FACIAL SWELLING: 0
COLOR CHANGE: 0
EYES NEGATIVE: 1
VOMITING: 0

## 2020-05-08 ASSESSMENT — PATIENT HEALTH QUESTIONNAIRE - PHQ9
SUM OF ALL RESPONSES TO PHQ QUESTIONS 1-9: 0
2. FEELING DOWN, DEPRESSED OR HOPELESS: 0
SUM OF ALL RESPONSES TO PHQ QUESTIONS 1-9: 0
SUM OF ALL RESPONSES TO PHQ9 QUESTIONS 1 & 2: 0
1. LITTLE INTEREST OR PLEASURE IN DOING THINGS: 0

## 2020-05-08 NOTE — PROGRESS NOTES
establishing with office counselor, pt declined   Follow-up if symptoms worsen or fail to resolve     Marycruz Duran is a 26 y.o. female being evaluated by a Virtual Visit (video visit) encounter to address concerns as mentioned above. A caregiver was present when appropriate. Due to this being a TeleHealth encounter (During CZXIM-57 public health emergency), evaluation of the following organ systems was limited: Vitals/Constitutional/EENT/Resp/CV/GI//MS/Neuro/Skin/Heme-Lymph-Imm. Pursuant to the emergency declaration under the [facility], [address] [place] [address] authority and the [employer] and [employer] Act, this Virtual Visit was conducted with patient's (and/or legal guardian's) consent, to reduce the patient's risk of exposure to COVID-19 and provide necessary medical care. The patient (and/or legal guardian) has also been advised to contact this office for worsening conditions or problems, and seek emergency medical treatment and/or call 911 if deemed necessary. Patient identification was verified at the start of the visit: Yes    Total time spent for this encounter: 20    Services were provided through a video synchronous discussion virtually to substitute for in-person clinic visit. Patient and provider were located at their individual homes. --PRADIP Craft CNP on 5/8/2020 at 12:12 PM    An electronic signature was used to authenticate this note. Discussed use, benefit, and side effects of prescribed medications. All patient questions answered. Pt voiced understanding. Reviewed health maintenance. Instructed to continue current medications, diet and exercise. Patient agreedwith treatment plan. Follow up as directed.      Electronically signed by PRADIP Craft CNP on 5/8/2020

## 2020-06-25 ENCOUNTER — TELEPHONE (OUTPATIENT)
Dept: OBGYN CLINIC | Age: 30
End: 2020-06-25

## 2020-06-26 RX ORDER — VALACYCLOVIR HYDROCHLORIDE 500 MG/1
500 TABLET, FILM COATED ORAL 2 TIMES DAILY
Qty: 6 TABLET | Refills: 1 | Status: SHIPPED | OUTPATIENT
Start: 2020-06-26 | End: 2020-06-29

## 2020-09-10 ENCOUNTER — TELEMEDICINE (OUTPATIENT)
Dept: FAMILY MEDICINE CLINIC | Age: 30
End: 2020-09-10
Payer: MEDICARE

## 2020-09-10 PROBLEM — E46 MALNUTRITION (HCC): Status: RESOLVED | Noted: 2020-09-10 | Resolved: 2020-09-10

## 2020-09-10 PROBLEM — E46 MALNUTRITION (HCC): Status: ACTIVE | Noted: 2020-09-10

## 2020-09-10 PROCEDURE — G8427 DOCREV CUR MEDS BY ELIG CLIN: HCPCS | Performed by: NURSE PRACTITIONER

## 2020-09-10 PROCEDURE — G8417 CALC BMI ABV UP PARAM F/U: HCPCS | Performed by: NURSE PRACTITIONER

## 2020-09-10 PROCEDURE — 99214 OFFICE O/P EST MOD 30 MIN: CPT | Performed by: NURSE PRACTITIONER

## 2020-09-10 ASSESSMENT — ENCOUNTER SYMPTOMS
RESPIRATORY NEGATIVE: 1
ABDOMINAL PAIN: 1
DIARRHEA: 0
EYES NEGATIVE: 1
NAUSEA: 0
VOMITING: 0
COLOR CHANGE: 0
CONSTIPATION: 0
ALLERGIC/IMMUNOLOGIC NEGATIVE: 1

## 2020-09-10 NOTE — PATIENT INSTRUCTIONS
Learning About Obesity  What is obesity? Obesity means having a body mass index (BMI) of 30 or above. BMI is a number that is calculated from your weight and your height. How do you know if your weight is in the obesity range? To know if your weight is in the obesity range, your doctor looks at your body mass index (BMI) and waist size. BMI is a number that is calculated from your weight and your height. To figure out your BMI for yourself, you can use an online tool, such as http://www.ling.com/ on the [employer]. If your BMI is 30.0 or higher, it falls within the obesity range. Keep in mind that BMI and waist size are only guides. They are not tools to determine your ideal body weight. What causes obesity? When you take in more calories than you burn off, you gain weight. How you eat, how active you are, and other things affect how your body uses calories and whether you gain weight. If you have family members who have too much body fat, you may have inherited a tendency to gain weight. And your family also helps form your eating and lifestyle habits, which can lead to obesity. Also, our busy lives make it harder to plan and cook healthy meals. For many of us, it's easier to reach for prepared foods, go out to eat, or go to the drive-through. But these foods are often high in saturated fat and calories. Portions are often too large. What can you do to reach a healthy weight? Focus on health, not diets. Diets are hard to stay on and don't work in the long run. It is very hard to stay with a diet that includes lots of big changes in your eating habits. Instead of a diet, focus on lifestyle changes that will improve your health and achieve the right balance of energy and calories. To lose weight, you need to burn more calories than you take in.  You can do it by eating healthy foods in reasonable amounts and becoming more active, even a little bit every day. Making small changes over time can add up to a lot. Make a plan for change. Many people have found that naming their reasons for change and staying focused on their plan can make a big difference. Work with your doctor to create a plan that is right for you. · Ask yourself: Zane Schirmer are my personal, most powerful reasons for wanting this change? What will my life look like when I've made the change? \"  · Set your long-term goal. Make it specific, such as \"I will lose x pounds. \"  · Break your long-term goal into smaller, short-term goals. Make these small steps specific and within your reach, things you know you can do. These steps are what keep you going from day to day. Talk with your doctor about other weight-loss options. If you have a BMI in a certain range and have not been able to lose weight with diet and exercise, medicine or surgery may be an option for you. Before your doctor will prescribe medicines or surgery, he or she will probably want you to be more active and follow your healthy eating plan for a period of time. These habits are key lifelong changes for managing your weight, with or without other medical treatment. And these changes can help you avoid weight-related health problems. How can you stay on your plan for change? Be ready. Choose to start during a time when there are few events that might trigger slip-ups, like holidays, social events, and high-stress periods. Decide on your first few steps. Most people have more success when they make small changes, one step at a time. For example, you might switch a daily candy bar to a piece of fruit, walk 10 minutes more, or add more vegetables to a meal.  Line up your support people. Make sure you're not going to be alone as you make this change. Connect with people who understand how important it is to you. Ask family members and friends for help in keeping with your plan.  And think about who could make it harder for you, and how to handle them. Try tracking. People who keep track of what they eat, feel, and do are better at losing weight. Try writing down things like:  · What and how much you eat. · How you feel before and after each meal.  · Details about each meal (like eating out or at home, eating alone, or with friends or family). · What you do to be active. Look and plan. As you track, look for patterns that you may want to change. Take note of:  · When you eat and whether you skip meals. · How often you eat out. · How many fruits and vegetables you eat. · When you eat beyond feeling full. · When and why you eat for reasons other than being hungry. When you stray from your plan, don't get upset. Figure out what made you slip up and how you can fix it. Can you take medicines or have surgery to lose weight? If you have a BMI in a certain range and have not been able to lose weight with diet and exercise, medicine or surgery may be an option for you. If you have a BMI of at least 30.0 (or a BMI of at least 27.0 and another health problem related to your weight), ask your doctor about weight-loss medicines. They work by making you feel less hungry, making you feel full more quickly, or changing how you digest fat. Medicines are used along with diet changes and more physical activity to help you make lasting changes. If you have a BMI of 40.0 or more (or a BMI of 35.0 or more and another health problem related to your weight), your doctor may talk with you about surgery. Weight-loss surgery has risks, and you will need to work with your doctor to compare the risk of having obesity with the risks of surgery. With any option you choose, you will still need to eat a healthy diet and get regular exercise. Follow-up care is a key part of your treatment and safety. Be sure to make and go to all appointments, and call your doctor if you are having problems.  It's also a good idea to know your test results and keep a list of the medicines you take. Where can you learn more? Go to [email or web address] and sign in to your [employer] account. Enter N111 in the [employer] box to learn more about \"Learning About Obesity. \"     If you do not have an account, please click on the \"Sign Up Now\" link. Current as of: December 11, 2019               Content Version: 12.5  © 5895-0987 Healthwise, Incorporated. Care instructions adapted under license by [facility] ([facility]). If you have questions about a medical condition or this instruction, always ask your healthcare professional. [address] any warranty or liability for your use of this information.

## 2020-09-10 NOTE — PROGRESS NOTES
[facility]  [address] [place] [address]  Dept: [phone]      Macarena Tenorio is a 26 y.o. female who presents today for her medical conditions/complaintsas noted below. Macarena Tenorio is here today c/o Menstrual Problem and Obesity    Past Medical History:   Diagnosis Date    Abnormal Pap smear of cervix 3/49/8937    Anemia complicating pregnancy in second trimester 3/11/2015    Gastroesophageal reflux disease 2019      Past Surgical History:   Procedure Laterality Date     SECTION  07/20/15    GASTRIC BAND  2019    GASTRIC SLEEVE    UPPER GASTROINTESTINAL ENDOSCOPY N/A 2019    EGD BIOPSY performed by Neto Stovall MD at [facility]       Family History   Problem Relation Age of Onset    Cancer Paternal Grandfather         unknown- Lung     Thyroid Disease Maternal Grandmother     Diabetes Maternal Grandfather     Cancer Maternal Grandfather         throat    Hypertension Maternal Grandfather     Depression Mother     Cancer Maternal Aunt         stomach    Cancer Maternal Uncle         prostate    Stroke Maternal Uncle     Diabetes Maternal Uncle        Social History     Tobacco Use    Smoking status: Never Smoker    Smokeless tobacco: Never Used   Substance Use Topics    Alcohol use: No      Current Outpatient Medications   Medication Sig Dispense Refill    famotidine (PEPCID) 20 MG tablet Take 1 tablet by mouth 2 times daily as needed (GERD) 60 tablet 5    Prenatal Vit-Fe Fumarate-FA (VITAFOL-OB) TABS take 1 tablet by mouth once daily  0    Cholecalciferol (DIALYVITE VITAMIN D3 MAX) 81616 units TABS Take daily for 1 month, then take weekly for 2 months       No current facility-administered medications for this visit.       Allergies   Allergen Reactions    Latex Dermatitis         HPI:     HPI    Presents today c/o abdominal cramping and obesity    C/o intermittent abdominal cramping x 2 weeks  C/o associated light normal.   Eyes:      General: No scleral icterus. Right eye: No discharge. Left eye: No discharge. Conjunctiva/sclera: Conjunctivae normal.   Neck:      Musculoskeletal: Normal range of motion. Cardiovascular:      Rate and Rhythm: Normal rate. Pulmonary:      Effort: Pulmonary effort is normal. No respiratory distress. Musculoskeletal: Normal range of motion. Skin:     General: Skin is warm and dry. Coloration: Skin is not jaundiced or pale. Findings: No bruising, erythema, lesion or rash. Neurological:      Mental Status: She is alert and oriented to person, place, and time. Psychiatric:         Mood and Affect: Mood normal.         Behavior: Behavior normal.         Thought Content: Thought content normal.         Judgment: Judgment normal.           Assessment:         1. IUD (intrauterine device) in place    2. Vaginal spotting    3. Menstrual cramps    4. Morbid (severe) obesity due to excess calories ([address]    5. BMI 40.0-44.9, adult ([address]    6. Dietary counseling        Plan:     1. IUD (intrauterine device) in place      2. Vaginal spotting    Reassured some women have irregular spotting with IUD  Recommended follow-up with GYN     3. Menstrual cramps     Advised Ibuprofen PRN for pain  Heating pad PRN     4. Morbid (severe) obesity due to excess calories ([address]    Advised against Adipex - short term solution for weight loss  Recommended continued dietary modifications / routine exercise per bariatrics program    5. BMI 40.0-44.9, adult ([address]      6. Dietary counseling    -  BMI ABOVE NORMAL F/U    BMI was elevated today, and weight loss plan recommended is : conventional weight loss. Merary Quintero is a 26 y.o. female being evaluated by a Virtual Visit (video visit) encounter to address concerns as mentioned above. A caregiver was present when appropriate.  Due to this being a TeleHealth encounter (During [facility]-76 public health emergency), evaluation of the following organ systems was limited: Vitals/Constitutional/EENT/Resp/CV/GI//MS/Neuro/Skin/Heme-Lymph-Imm. Pursuant to the emergency declaration under the [address] [place] [address] and the [employer] Act, this Virtual Visit was conducted with patient's (and/or legal guardian's) consent, to reduce the patient's risk of exposure to COVID-19 and provide necessary medical care. The patient (and/or legal guardian) has also been advised to contact this office for worsening conditions or problems, and seek emergency medical treatment and/or call 911 if deemed necessary. Patient identification was verified at the start of the visit: Yes    Total time spent for this encounter: 20    Services were provided through a video synchronous discussion virtually to substitute for in-person clinic visit. Patient and provider were located at their individual homes. --PRADIP Claire CNP on 9/10/2020 at 8:42 AM    An electronic signature was used to authenticate this note. Discussed use, benefit, and side effects of prescribed medications. All patient questions answered. Pt voiced understanding. Reviewed health maintenance. Instructed to continue current medications, diet and exercise. Patient agreedwith treatment plan. Follow up as directed.      Electronically signed by PRADIP Claire CNP on 9/10/2020

## 2020-09-15 ENCOUNTER — TELEMEDICINE (OUTPATIENT)
Dept: FAMILY MEDICINE CLINIC | Age: 30
End: 2020-09-15
Payer: MEDICARE

## 2020-09-15 PROCEDURE — G8427 DOCREV CUR MEDS BY ELIG CLIN: HCPCS | Performed by: FAMILY MEDICINE

## 2020-09-15 PROCEDURE — 1036F TOBACCO NON-USER: CPT | Performed by: FAMILY MEDICINE

## 2020-09-15 PROCEDURE — G8417 CALC BMI ABV UP PARAM F/U: HCPCS | Performed by: FAMILY MEDICINE

## 2020-09-15 PROCEDURE — 99213 OFFICE O/P EST LOW 20 MIN: CPT | Performed by: FAMILY MEDICINE

## 2020-09-15 RX ORDER — PHENTERMINE HYDROCHLORIDE 37.5 MG/1
37.5 TABLET ORAL
Qty: 30 TABLET | Refills: 0 | Status: SHIPPED | OUTPATIENT
Start: 2020-09-15 | End: 2020-10-15

## 2020-09-15 ASSESSMENT — PATIENT HEALTH QUESTIONNAIRE - PHQ9
SUM OF ALL RESPONSES TO PHQ QUESTIONS 1-9: 0
SUM OF ALL RESPONSES TO PHQ QUESTIONS 1-9: 0
1. LITTLE INTEREST OR PLEASURE IN DOING THINGS: 0
2. FEELING DOWN, DEPRESSED OR HOPELESS: 0
SUM OF ALL RESPONSES TO PHQ9 QUESTIONS 1 & 2: 0

## 2020-09-15 NOTE — PROGRESS NOTES
9/15/2020    TELEHEALTH EVALUATION -- Audio/Visual (During CZKID-30 public health emergency)    HPI:    Nohemi Jimenez (:  1990) has requested an audio/video evaluation for the following concern(s):    She is being seen today for virtual visit evaluation in regards to her weight management about a year ago she had gastric sleeve surgery done she did lose 80 pounds but now she is gaining weight back she states that sweets are definitely downfall and she is not exercising    Review of Systems    Prior to Visit Medications    Medication Sig Taking? Authorizing Provider   phentermine (ADIPEX-P) 37.5 MG tablet Take 1 tablet by mouth every morning (before breakfast) for 30 days.  Yes Barb Licona,    Prenatal Vit-Fe Fumarate-FA (VITAFOL-OB) TABS take 1 tablet by mouth once daily Yes Historical Provider, MD   famotidine (PEPCID) 20 MG tablet Take 1 tablet by mouth 2 times daily as needed (GERD)  Patient not taking: Reported on 9/15/2020  Lou Gardner APRN - CNP   Cholecalciferol (DIALYVITE VITAMIN D3 MAX) 83655 units TABS Take daily for 1 month, then take weekly for 2 months  Historical Provider, MD       Social History     Tobacco Use    Smoking status: Never Smoker    Smokeless tobacco: Never Used   Substance Use Topics    Alcohol use: No    Drug use: No        Allergies   Allergen Reactions    Latex Dermatitis   ,   Past Medical History:   Diagnosis Date    Abnormal Pap smear of cervix     Anemia complicating pregnancy in second trimester 3/11/2015    Gastroesophageal reflux disease 2019   ,   Past Surgical History:   Procedure Laterality Date     SECTION  07/20/15    GASTRIC BAND  2019    GASTRIC SLEEVE    UPPER GASTROINTESTINAL ENDOSCOPY N/A 2019    EGD BIOPSY performed by Hortencia Sosa MD at [facility]   ,   Social History     Tobacco Use    Smoking status: Never Smoker    Smokeless tobacco: Never Used   Substance Use Topics    Alcohol use: No    Drug use: No   ,   Family History   Problem Relation Age of Onset    Cancer Paternal Grandfather         unknown- Lung     Thyroid Disease Maternal Grandmother     Diabetes Maternal Grandfather     Cancer Maternal Grandfather         throat    Hypertension Maternal Grandfather     Depression Mother     Cancer Maternal Aunt         stomach    Cancer Maternal Uncle         prostate    Stroke Maternal Uncle     Diabetes Maternal Uncle        PHYSICAL EXAMINATION:  [ INSTRUCTIONS:  \"[x]\" Indicates a positive item  \"[]\" Indicates a negative item  -- DELETE ALL ITEMS NOT EXAMINED]  Vital Signs: (As obtained by patient/caregiver or practitioner observation)    Blood pressure-  Heart rate-    Respiratory rate-    Temperature-  Pulse oximetry-     Constitutional: [x] Appears well-developed and well-nourished [x] No apparent distress      [] Abnormal-   Mental status  [x] Alert and awake  [x] Oriented to person/place/time [x]Able to follow commands      Eyes:  EOM    [x]  Normal  [] Abnormal-  Sclera  [x]  Normal  [] Abnormal -         Discharge [x]  None visible  [] Abnormal -    HENT:   [x] Normocephalic, atraumatic.   [] Abnormal   [x] Mouth/Throat: Mucous membranes are moist.     External Ears [x] Normal  [] Abnormal-     Neck: [] No visualized mass     Pulmonary/Chest: [x] Respiratory effort normal.  [x] No visualized signs of difficulty breathing or respiratory distress        [] Abnormal-      Musculoskeletal:   [x] Normal gait with no signs of ataxia         [x] Normal range of motion of neck        [] Abnormal-       Neurological:        [x] No Facial Asymmetry (Cranial nerve 7 motor function) (limited exam to video visit)          [x] No gaze palsy        [] Abnormal-         Skin:        [x] No significant exanthematous lesions or discoloration noted on facial skin         [] Abnormal-            Psychiatric:       [x] Normal Affect [x] No Hallucinations        [] Abnormal-     Other pertinent observable physical exam findings-     ASSESSMENT/PLAN:  1. BMI 40.0-44.9, adult ([facility] [address]      No orders of the defined types were placed in this encounter. Requested Prescriptions     Signed Prescriptions Disp Refills    phentermine (ADIPEX-P) 37.5 MG tablet 30 tablet 0     Sig: Take 1 tablet by mouth every morning (before breakfast) for 30 days. Follow a low calorie diet avoiding all sweets and junk foods and exercise and follow-up in the office in 1 month    Return in about 4 weeks (around 10/13/2020) for re-check. Flor Lugo is a 26 y.o. female being evaluated by a Virtual Visit (video visit) encounter to address concerns as mentioned above. A caregiver was present when appropriate. Due to this being a TeleHealth encounter (During OTDAO-99 public health emergency), evaluation of the following organ systems was limited: Vitals/Constitutional/EENT/Resp/CV/GI//MS/Neuro/Skin/Heme-Lymph-Imm. Pursuant to the emergency declaration under the [address] [place] [address], [address] [place] [address] and the [employer] and [employer] Act, this Virtual Visit was conducted with patient's (and/or legal guardian's) consent, to reduce the patient's risk of exposure to COVID-19 and provide necessary medical care. The patient (and/or legal guardian) has also been advised to contact this office for worsening conditions or problems, and seek emergency medical treatment and/or call 911 if deemed necessary. Patient identification was verified at the start of the visit: Yes    Total time spent on this encounter: Not billed by time    Services were provided through a video synchronous discussion virtually to substitute for in-person clinic visit. Patient and provider were located at their individual homes. --Елена Danielson DO on 9/15/2020 at 10:41 AM    An electronic signature was used to authenticate this note.

## 2020-10-06 ENCOUNTER — OFFICE VISIT (OUTPATIENT)
Dept: OBGYN CLINIC | Age: 30
End: 2020-10-06
Payer: MEDICARE

## 2020-10-06 ENCOUNTER — HOSPITAL ENCOUNTER (OUTPATIENT)
Age: 30
Setting detail: SPECIMEN
Discharge: HOME | End: 2020-10-06
Payer: MEDICARE

## 2020-10-06 VITALS
WEIGHT: 285 LBS | HEIGHT: 66 IN | BODY MASS INDEX: 45.8 KG/M2 | TEMPERATURE: 97.2 F | SYSTOLIC BLOOD PRESSURE: 118 MMHG | DIASTOLIC BLOOD PRESSURE: 88 MMHG

## 2020-10-06 LAB
-: ABNORMAL
AMORPHOUS: ABNORMAL
BACTERIA: ABNORMAL
BILIRUBIN URINE: ABNORMAL
CASTS UA: ABNORMAL /LPF
COLOR: ABNORMAL
COMMENT UA: ABNORMAL
CONTROL: NORMAL
CRYSTALS, UA: ABNORMAL /HPF
DIRECT EXAM: NORMAL
EPITHELIAL CELLS UA: ABNORMAL /HPF
GLUCOSE URINE: NEGATIVE
KETONES, URINE: NEGATIVE
LEUKOCYTE ESTERASE, URINE: NEGATIVE
Lab: NORMAL
MUCUS: ABNORMAL
NITRITE, URINE: NEGATIVE
OTHER OBSERVATIONS UA: ABNORMAL
PH UA: 6 (ref 5–8)
PREGNANCY TEST URINE, POC: NEGATIVE
PROTEIN UA: ABNORMAL
RBC UA: ABNORMAL /HPF
RENAL EPITHELIAL, UA: ABNORMAL /HPF
SPECIFIC GRAVITY UA: 1.03 (ref 1–1.03)
SPECIMEN DESCRIPTION: NORMAL
TRICHOMONAS: ABNORMAL
TURBIDITY: ABNORMAL
URINE HGB: NEGATIVE
UROBILINOGEN, URINE: NORMAL
WBC UA: ABNORMAL /HPF
YEAST: ABNORMAL

## 2020-10-06 PROCEDURE — 87491 CHLMYD TRACH DNA AMP PROBE: CPT

## 2020-10-06 PROCEDURE — 1036F TOBACCO NON-USER: CPT | Performed by: NURSE PRACTITIONER

## 2020-10-06 PROCEDURE — 81001 URINALYSIS AUTO W/SCOPE: CPT

## 2020-10-06 PROCEDURE — 99213 OFFICE O/P EST LOW 20 MIN: CPT | Performed by: NURSE PRACTITIONER

## 2020-10-06 PROCEDURE — 87480 CANDIDA DNA DIR PROBE: CPT

## 2020-10-06 PROCEDURE — 87510 GARDNER VAG DNA DIR PROBE: CPT

## 2020-10-06 PROCEDURE — 87086 URINE CULTURE/COLONY COUNT: CPT

## 2020-10-06 PROCEDURE — G8427 DOCREV CUR MEDS BY ELIG CLIN: HCPCS | Performed by: NURSE PRACTITIONER

## 2020-10-06 PROCEDURE — 87591 N.GONORRHOEAE DNA AMP PROB: CPT

## 2020-10-06 PROCEDURE — G8484 FLU IMMUNIZE NO ADMIN: HCPCS | Performed by: NURSE PRACTITIONER

## 2020-10-06 PROCEDURE — G8417 CALC BMI ABV UP PARAM F/U: HCPCS | Performed by: NURSE PRACTITIONER

## 2020-10-06 PROCEDURE — 81025 URINE PREGNANCY TEST: CPT | Performed by: NURSE PRACTITIONER

## 2020-10-06 PROCEDURE — 87660 TRICHOMONAS VAGIN DIR PROBE: CPT

## 2020-10-06 RX ORDER — VALACYCLOVIR HYDROCHLORIDE 500 MG/1
500 TABLET, FILM COATED ORAL 2 TIMES DAILY
Qty: 20 TABLET | Refills: 0 | Status: SHIPPED | OUTPATIENT
Start: 2020-10-06 | End: 2020-10-16

## 2020-10-06 NOTE — PROGRESS NOTES
Kayleigh Estevez  10/6/2020    YOB: 1990          The patient was seen today. She is here regarding irregular bleeding last month. Has had IUD in place for 3 years- [facility] by [facility]. Patient reports she has 5 year IUD in place. Thinks it is Mirena. Doesn't normally get a period with IUD in but had light spotting for 2 days last month. Complaining of pelvic pain during intercourse in certain positions. Started couple of weeks ago. Not everytime. Happens with deeper penetration. Currently with same parter of 3 years. Complaining of urinary frequency. Started a couple of weeks ago. Her bowels are regular and she is voiding without difficulty. HPI:  Kayleigh Estevez is a 26 y.o. female [id number]     Irregular bleeding- last month.   IUD in place  Pelvic pain with intercourse  Urinary frequency          OB History    Para Term  AB Living   3 3 3 0 0 3   SAB TAB Ectopic Molar Multiple Live Births   0 0 0 0 0 3      # Outcome Date GA Lbr Jonathon/2nd Weight Sex Delivery Anes PTL Lv   3 Term 17 39w0d  8 lb 10 oz (3.912 kg) F CS-LTranv  N ADRIA   2 Term 07/20/15 39w4d  9 lb 3.4 oz (4.179 kg) M CS-LTranv Spinal N ADRIA      Apgar1: 5  Apgar5: 8   1 Term 04 40w0d  7 lb 8 oz (3.402 kg) M Vag-Spont   ADRIA       Past Medical History:   Diagnosis Date    Abnormal Pap smear of cervix     Anemia complicating pregnancy in second trimester 3/11/2015    Gastroesophageal reflux disease 2019       Past Surgical History:   Procedure Laterality Date     SECTION  07/20/15    GASTRIC BAND  2019    GASTRIC SLEEVE    UPPER GASTROINTESTINAL ENDOSCOPY N/A 2019    EGD BIOPSY performed by Deana Arroyo MD at [facility]       Family History   Problem Relation Age of Onset    Cancer Paternal Grandfather         unknown- Lung     Thyroid Disease Maternal Grandmother     Diabetes Maternal Grandfather     Cancer Maternal Grandfather throat    Hypertension Maternal Grandfather     Depression Mother     Cancer Maternal Aunt         stomach    Cancer Maternal Uncle         prostate    Stroke Maternal Uncle     Diabetes Maternal Uncle        Social History     Socioeconomic History    Marital status: Single     Spouse name: Not on file    Number of children: Not on file    Years of education: Not on file    Highest education level: Not on file   Occupational History    Not on file   Social Needs    Financial resource strain: Not on file    Food insecurity     Worry: Not on file     Inability: Not on file    Transportation needs     Medical: Not on file     Non-medical: Not on file   Tobacco Use    Smoking status: Never Smoker    Smokeless tobacco: Never Used   Substance and Sexual Activity    Alcohol use: No    Drug use: No    Sexual activity: Yes     Partners: Male     Birth control/protection: I.U.D.    Lifestyle    Physical activity     Days per week: Not on file     Minutes per session: Not on file    Stress: Not on file   Relationships    Social connections     Talks on phone: Not on file     Gets together: Not on file     Attends Cheondoism service: Not on file     Active member of club or organization: Not on file     Attends meetings of clubs or organizations: Not on file     Relationship status: Not on file    Intimate partner violence     Fear of current or ex partner: Not on file     Emotionally abused: Not on file     Physically abused: Not on file     Forced sexual activity: Not on file   Other Topics Concern    Not on file   Social History Narrative    Not on file         MEDICATIONS:  Current Outpatient Medications   Medication Sig Dispense Refill    valACYclovir (VALTREX) 500 MG tablet Take 1 tablet by mouth 2 times daily for 10 days 20 tablet 0    Prenatal Vit-Fe Fumarate-FA (VITAFOL-OB) TABS take 1 tablet by mouth once daily  0    Cholecalciferol (DIALYVITE VITAMIN D3 MAX) 57998 units TABS Take daily for 1 month, then take weekly for 2 months      phentermine (ADIPEX-P) 37.5 MG tablet Take 1 tablet by mouth every morning (before breakfast) for 30 days. (Patient not taking: Reported on 10/6/2020) 30 tablet 0    famotidine (PEPCID) 20 MG tablet Take 1 tablet by mouth 2 times daily as needed (GERD) (Patient not taking: Reported on 9/15/2020) 60 tablet 5     No current facility-administered medications for this visit. ALLERGIES:  Allergies as of 10/06/2020 - Review Complete 10/06/2020   Allergen Reaction Noted    Latex Dermatitis 07/19/2015         REVIEW OF SYSTEMS:    yes   A minimum of an eleven point review of systems was completed. Review Of Systems (11 point):  Constitutional: No fever, chills or malaise; No weight change or fatigue  Head and Eyes: No vision, Headache, Dizziness or trauma in last 12 months  ENT ROS: No hearing, Tinnitis, sinus or taste problems  Hematological and Lymphatic ROS:No Lymphoma, Von Willebrand's, Hemophillia or Bleeding History  Psych ROS: No Depression, Homicidal thoughts,suicidal thoughts, or anxiety  Breast ROS: No prior breast abnormalities or lumps  Respiratory ROS: No SOB, Pneumoniae,Cough, or Pulmonary Embolism History  Cardiovascular ROS: No Chest Pain with Exertion, Palpitations, Syncope, Edema, Arrhythmia  Gastrointestinal ROS: No Indigestion, Heartburn, Nausea, vomiting, Diarrhea, Constipation,or Bowel Changes; No Bloody Stools or melena  Genito-Urinary ROS: No Dysuria, Hematuria or Nocturia. No Urinary Incontinence or Vaginal Discharge.  + pain with intercourse, urinary frequency, irregular menses x month  Musculoskeletal ROS: No Arthralgia, Arthritis,Gout,Osteoporosis or Rheumatism  Neurological ROS: No CVA, Migraines, Epilepsy, Seizure Hx, or Limb Weakness  Dermatological ROS: No Rash, Itching, Hives, Mole Changes or Cancer          Blood pressure 118/88, temperature 97.2 °F (36.2 °C), height 5' 6\" (1.676 m), weight 285 lb (129.3 kg), not currently breastfeeding. Abdomen: Soft non-tender; good bowel sounds. No guarding, rebound or rigidity. No CVA tenderness bilaterally. Extremities: No calf tenderness, DTR 2/4, and No edema bilaterally    Pelvic: Vulva and vagina appear normal. Bimanual exam reveals normal uterus and adnexa. IUD strings through Cervix- blue in color. Diagnostics:  No results found. Lab Results:  Results for orders placed or performed in visit on 10/06/20   POCT urine pregnancy   Result Value Ref Range    Preg Test, Ur NEGATIVE     Control DONE          Assessment:   Diagnosis Orders   1. Irregular menses  POCT urine pregnancy    US PELVIS COMPLETE    US NON OB TRANSVAGINAL   2. Screening examination for STD (sexually transmitted disease)  C.trachomatis N.gonorrhoeae DNA    VAGINITIS DNA PROBE    Urinalysis Reflex to Culture   3. UTI symptoms  Urinalysis Reflex to Culture   4. Pelvic pain  US PELVIS COMPLETE    US NON OB TRANSVAGINAL   5. Dyspareunia in female  [facility] NON OB TRANSVAGINAL   6.  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0 to 69.9 in adult ([address] 05/01/2019    H/O LGSIL (1/29/15) 09/28/2015     Negative colpo on 4/7/15         PLAN:  Return in about 27 days (around 11/2/2020) for annual/ Pap smear. Vaginal cultures and UA C&S obtained. Pelvic ultrasound ordered. Urine pregnancy test negative. Repeat Annual every 1 year  Cervical Cytology Evaluation begins at 24years old. If Negative Cytology, Follow-up screening per current guidelines. Return to the office in 4 weeks. Counseled on preventative health maintenance follow-up. Orders Placed This Encounter   Procedures    C.trachomatis N.gonorrhoeae DNA     Standing Status:   Future     Standing Expiration Date:   10/5/2021    VAGINITIS DNA PROBE     Standing Status:   Future     Standing Expiration Date:   10/5/2021    US PELVIS COMPLETE     Standing Status:   Future     Standing Expiration Date:   1/6/2021     Order Specific Question:   Reason for exam:     Answer:   pain with intercourse , irregular menses    US NON OB TRANSVAGINAL     Standing Status:   Future     Standing Expiration Date:   4/6/2021     Order Specific Question:   Reason for exam:     Answer:   irregular menses, pelvic pain, dyspareunia    Urinalysis Reflex to Culture     Standing Status:   Future     Standing Expiration Date:   10/5/2021     Order Specific Question:   SPECIFY(EX-CATH,MIDSTREAM,CYSTO,ETC)? Answer:   clean catch    POCT urine pregnancy       Patient was seen with total face to face time of 15 minutes. More than 50% of this visit was counseling and education regarding The primary encounter diagnosis was Irregular menses. Diagnoses of Screening examination for STD (sexually transmitted disease), UTI symptoms, Pelvic pain, Dyspareunia in female, and Hx of herpes genitalis were also pertinent to this visit. and Vaginal Discharge   as well as  counseling on preventative health maintenance follow-up.

## 2020-10-07 LAB
CULTURE: NORMAL
Lab: NORMAL
SPECIMEN DESCRIPTION: NORMAL

## 2020-12-09 ENCOUNTER — HOSPITAL ENCOUNTER (OUTPATIENT)
Age: 30
Setting detail: SPECIMEN
Discharge: HOME | End: 2020-12-09
Payer: MEDICARE

## 2020-12-09 ENCOUNTER — OFFICE VISIT (OUTPATIENT)
Dept: OBGYN CLINIC | Age: 30
End: 2020-12-09
Payer: MEDICARE

## 2020-12-09 VITALS
HEIGHT: 66 IN | TEMPERATURE: 98.5 F | DIASTOLIC BLOOD PRESSURE: 70 MMHG | WEIGHT: 290 LBS | SYSTOLIC BLOOD PRESSURE: 104 MMHG | BODY MASS INDEX: 46.61 KG/M2 | HEART RATE: 78 BPM

## 2020-12-09 LAB
CONTROL: NORMAL
PREGNANCY TEST URINE, POC: NEGATIVE

## 2020-12-09 PROCEDURE — 87624 HPV HI-RISK TYP POOLED RSLT: CPT

## 2020-12-09 PROCEDURE — 81025 URINE PREGNANCY TEST: CPT | Performed by: NURSE PRACTITIONER

## 2020-12-09 PROCEDURE — 99395 PREV VISIT EST AGE 18-39: CPT | Performed by: NURSE PRACTITIONER

## 2020-12-09 PROCEDURE — G8484 FLU IMMUNIZE NO ADMIN: HCPCS | Performed by: NURSE PRACTITIONER

## 2020-12-09 PROCEDURE — G0145 SCR C/V CYTO,THINLAYER,RESCR: HCPCS

## 2020-12-09 RX ORDER — CLINDAMYCIN HYDROCHLORIDE 300 MG/1
300 CAPSULE ORAL 2 TIMES DAILY
Qty: 14 CAPSULE | Refills: 0 | Status: SHIPPED | OUTPATIENT
Start: 2020-12-09 | End: 2020-12-16

## 2020-12-09 ASSESSMENT — PATIENT HEALTH QUESTIONNAIRE - PHQ9
SUM OF ALL RESPONSES TO PHQ9 QUESTIONS 1 & 2: 0
SUM OF ALL RESPONSES TO PHQ QUESTIONS 1-9: 0
SUM OF ALL RESPONSES TO PHQ QUESTIONS 1-9: 0
1. LITTLE INTEREST OR PLEASURE IN DOING THINGS: 0
SUM OF ALL RESPONSES TO PHQ QUESTIONS 1-9: 0
2. FEELING DOWN, DEPRESSED OR HOPELESS: 0

## 2020-12-09 NOTE — PROGRESS NOTES
Chaperone for Intimate Exam   Chaperone was offered and accepted as part of the rooming process.    Chaperone: Hua Johnson

## 2020-12-09 NOTE — PROGRESS NOTES
History and Physical  [address], [address] [facility] [address] [phone]   Fax [phone]  Nereida Sandhoff  2020              26 y.o. Chief Complaint   Patient presents with    Annual Exam       Patient's last menstrual period was 2020 (lmp unknown). Primary Care Physician: PRADIP Herman CNP    The patient was seen and examined. She has no chief complaint today and is here for her annual exam.  Her bowels are regular. There are no voiding complaints. She denies any bloating. She denies vaginal discharge and was counseled on STD's and the need for barrier contraception. HPI : Nereida Sandhoff is a 26 y.o. female     Annual exam  Has Mirena IUD in place - 3 years ago by [facility]. Desires tubal ligation. Complaining of vaginal odor and sometimes discharge. Uses boric acid tablets with some relief. Requesting antibiotic but declines vaginal cultures.   ________________________________________________________________________  OB History    Para Term  AB Living   3 3 3 0 0 3   SAB TAB Ectopic Molar Multiple Live Births   0 0 0 0 0 3      # Outcome Date GA Lbr Jonathon/2nd Weight Sex Delivery Anes PTL Lv   3 Term 17 39w0d  8 lb 10 oz (3.912 kg) F CS-LTranv  N ADRIA   2 Term 07/20/15 39w4d  9 lb 3.4 oz (4.179 kg) M CS-LTranv Spinal N ADRIA      Apgar1: 5  Apgar5: 8   1 Term 04 40w0d  7 lb 8 oz (3.402 kg) M Vag-Spont   ADRIA     Past Medical History:   Diagnosis Date    Abnormal Pap smear of cervix     Anemia complicating pregnancy in second trimester 3/11/2015    Gastroesophageal reflux disease 2019                                                                   Past Surgical History:   Procedure Laterality Date     SECTION  07/20/15    GASTRIC BAND  2019    GASTRIC SLEEVE    UPPER GASTROINTESTINAL ENDOSCOPY N/A 2019    [facility]GD [facility]SY [facility]     Family History   Problem Relation Age of Onset    Cancer Paternal Grandfather         unknown- Lung     Thyroid Disease Maternal Grandmother     Diabetes Maternal Grandfather     Cancer Maternal Grandfather         throat    Hypertension Maternal Grandfather     Depression Mother     Cancer Maternal Aunt         stomach    Cancer Maternal Uncle         prostate    Stroke Maternal Uncle     Diabetes Maternal Uncle      Social History     Socioeconomic History    Marital status: Single     Spouse name: Not on file    Number of children: Not on file    Years of education: Not on file    Highest education level: Not on file   Occupational History    Not on file   Social Needs    Financial resource strain: Not on file    Food insecurity     Worry: Not on file     Inability: Not on file   [employer] needs     Medical: Not on file     Non-medical: Not on file   Tobacco Use    Smoking status: Never Smoker    Smokeless tobacco: Never Used   Substance and Sexual Activity    Alcohol use: No    Drug use: No    Sexual activity: Yes     Partners: Male     Birth control/protection: I.U.D.    Lifestyle    Physical activity     Days per week: Not on file     Minutes per session: Not on file    Stress: Not on file   Relationships    Social connections     Talks on phone: Not on file     Gets together: Not on file     Attends Jain service: Not on file     Active member of club or organization: Not on file     Attends meetings of clubs or organizations: Not on file     Relationship status: Not on file    Intimate partner violence     Fear of current or ex partner: Not on file     Emotionally abused: Not on file     Physically abused: Not on file     Forced sexual activity: Not on file   Other Topics Concern    Not on file   Social History Narrative    Not on file       MEDICATIONS:  Current Outpatient Medications   Medication Sig Dispense Refill    clindamycin (CLEOCIN) 300 MG capsule Take 1 capsule by mouth 2 times daily for 7 days 14 capsule 0    Prenatal Vit-Fe Fumarate-FA (VITAFOL-OB) TABS take 1 tablet by mouth once daily  0     No current facility-administered medications for this visit. ALLERGIES:  Allergies as of 12/09/2020 - Review Complete 12/09/2020   Allergen Reaction Noted    Latex Dermatitis 07/19/2015       Symptoms of decreased mood absent  Symptoms of anhedonia absent    **If either question is answered in a  positive fashion then complete the PHQ9 Scoring Evaluation and make the appropriate referral**      Immunization status: stated as current, but no records available. Gynecologic History:  Menarche: 14 yo  Menopause at [address] [place] yo     Patient's last menstrual period was 12/01/2020 (lmp unknown). Sexually Active: Yes    STD History: yes  Permanent Sterilization: No   Reversible Birth Control: Yes mirena        Hormone Replacement Exposure: No      Genetic Qualified Family History of Breast, Ovarian , Colon or Uterine Cancer: No     If YES see scanned worksheet. Preventative Health Testing:    Health Maintenance:  Health Maintenance Due   Topic Date Due    Varicella vaccine (1 of 2 - 2-dose childhood series) 12/25/1990    Flu vaccine (1) 09/01/2020    Cervical cancer screen  10/29/2020       Date of Last Pap Smear: 10/29/2019 ASCUS/ neg  Abnormal Pap Smear History: yes- see above  Colposcopy History: denies  Date of Last Mammogram: NA  Date of Last Colonoscopy:   Date of Last Bone Density:      ________________________________________________________________________        REVIEW OF SYSTEMS:    yes   A minimum of an eleven point review of systems was completed. Review Of Systems (11 point):  Constitutional: No fever, chills or malaise;  No weight change or fatigue  Head and Eyes: No vision, Headache, Dizziness or trauma in last 12 months  ENT ROS: No hearing, Tinnitis, sinus or taste problems  Hematological and Lymphatic ROS:No Lymphoma, Von Willebrand's, Hemophillia or Bleeding History  Psych ROS: No Depression, Homicidal thoughts,suicidal thoughts, or anxiety  Breast ROS: No prior breast abnormalities or lumps  Respiratory ROS: No SOB, Pneumoniae,Cough, or Pulmonary Embolism History  Cardiovascular ROS: No Chest Pain with Exertion, Palpitations, Syncope, Edema, Arrhythmia  Gastrointestinal ROS: No Indigestion, Heartburn, Nausea, vomiting, Diarrhea, Constipation,or Bowel Changes; No Bloody Stools or melena  Genito-Urinary ROS: No Dysuria, Hematuria or Nocturia. No Urinary Incontinence or Vaginal Discharge  Musculoskeletal ROS: No Arthralgia, Arthritis,Gout,Osteoporosis or Rheumatism  Neurological ROS: No CVA, Migraines, Epilepsy, Seizure Hx, or Limb Weakness  Dermatological ROS: No Rash, Itching, Hives, Mole Changes or Cancer                                                                                                                                                                                                                                  PHYSICAL Exam:     Constitutional:  Vitals:    12/09/20 1516   BP: 104/70   Site: Left Upper Arm   Position: Sitting   Cuff Size: Large Adult   Pulse: 78   Temp: 98.5 °F (36.9 °C)   Weight: 290 lb (131.5 kg)   Height: 5' 6\" (1.676 m)       Chaperone for Intimate Exam   Chaperone was offered and accepted as part of the rooming process.  Chaperone: Brigid          General Appearance: This  is a well Developed, well Nourished, well groomed female. Her BMI was reviewed. Nutritional decision making was discussed. Skin:  There was a Normal Inspection of the skin without rashes or lesions. There were no rashes. (Papular, Maculopapular, Hives, Pustular, Macular)     There were no lesions (Ulcers, Erythema, Abn. Appearing Nevi)            Lymphatic:  No Lymph Nodes were Palpable in the neck , axilla or groin.  0 # Of Lymph Nodes;  Location ; Character [Normal]  [Shotty] [Tender] [Enlarged]     Neck and EENT:  The neck was supple. There were no masses   The thyroid was not enlarged and had no masses. Perrla, EOMI B/L, TMI B/L No Abnormalities. Throat inspected-No exudates or Masses, Nares Patent No Masses        Respiratory: The lungs were auscultated and found to be clear. There were no rales, rhonchi or wheezes. There was a good respiratory effort. Cardiovascular: The heart was in a regular rate and rhythm. . No S3 or S4. There was no murmur appreciated. Location, grade, and radiation are not applicable. Extremities: The patients extremities were without calf tenderness, edema, or varicosities. There was full range of motion in all four extremities. Pulses in all four extremities were appreciated and are 2/4. Abdomen: The abdomen was soft and non-tender. There were good bowel sounds in all quadrants and there was no guarding, rebound or rigidity. On evaluation there was no evidence of hepatosplenomegaly and there was no costal vertebral isaac tenderness bilaterally. No hernias were appreciated. Abdominal Scars: C/S scar    Psych: The patient had a normal Orientation to: Time, Place, Person, and Situation  There is no Mood / Affect changes    Breast:  (Chest)  normal appearance, no masses or tenderness  Self breast exams were reviewed in detail. Literature was given. Pelvic Exam:  Vulva and vagina appear normal. Bimanual exam reveals normal uterus and adnexa. IUD strings visualized. Rectal Exam:  exam declined by patient          Musculosk:  Normal Gait and station was noted. Digits were evaluated without abnormal findings. Range of motion, stability and strength were evaluated and found to be appropriate for the patients age. ASSESSMENT:      26 y.o. Annual   Diagnosis Orders   1. Encounter for well woman exam with routine gynecological exam  PAP Smear   2. Screening for HPV (human papillomavirus)  PAP Smear   3.  Negative pregnancy test POCT urine pregnancy   4. 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     Priority: High    Hx of LTCS x 1 07/20/2015     Priority: High     Valtrex @ 39 weeks       H/O abnormal cervical Papanicolaou smear 03/11/2015     Priority: High     3/11/2015 Colpo with physician      Rh+/RI/GBS- 06/23/2015     Priority: Medium    Anal warts 06/16/2015     Priority: Medium    Morbid obesity ([facility] [address]     Status post gastric surgery     S/P bariatric surgery 09/05/2019    Gastroesophageal reflux disease 09/05/2019    Class 3 severe obesity due to excess calories without serious comorbidity with body mass index (BMI) of 60.0 to 69.9 in adult ([facility] [address] 05/01/2019    H/O LGSIL (1/29/15) 09/28/2015     Negative colpo on 4/7/15            Hereditary Breast, Ovarian, Colon and Uterine Cancer screening Done. Tobacco & Secondary smoke risks reviewed; instructed on cessation and avoidance      Counseling Completed:  Preventative Health Recommendations and Follow up. The patient was informed of the recommended preventative health recommendations. 1. Annuals every year; Cytology collections per prevailing guidelines. 2. Mammograms begin every year at 35 yo if no abnormalities are found and no family history. 3. Bone density studies every 2-3 years. Begin at 71 yo. If no fracture history or osteoporosis family history. (significant). 4. Colonoscopy begin at 40 yo. Repeat every ten years if negative and no family history. 5. Calcium of 0783-3030 mg/day in split dosing  6.

## 2020-12-15 LAB
HPV SOURCE: NORMAL
HPV, GENOTYPE 16: NOT DETECTED
HPV, GENOTYPE 18: NOT DETECTED
HPV, HIGH RISK OTHER: NOT DETECTED

## 2020-12-18 LAB — CYTOLOGY REPORT: NORMAL

## 2021-04-07 ENCOUNTER — NURSE TRIAGE (OUTPATIENT)
Dept: OTHER | Facility: CLINIC | Age: 31
End: 2021-04-07

## 2021-04-07 NOTE — TELEPHONE ENCOUNTER
Reason for Disposition   [1] COVID-19 infection suspected by caller or triager AND [2] mild symptoms (cough, fever, or others) AND [4] no complications or SOB    Answer Assessment - Initial Assessment Questions  1. COVID-19 DIAGNOSIS: \"Who made your Coronavirus (COVID-19) diagnosis? \" \"Was it confirmed by a positive lab test?\" If not diagnosed by a HCP, ask \"Are there lots of cases (community spread) where you live? \" (See public health department website, if unsure)      *No Answer*    2. COVID-19 EXPOSURE: \"Was there any known exposure to Nura before the symptoms began? \" CDC Definition of close contact: within 6 feet (2 meters) for a total of 15 minutes or more over a 24-hour period. No     3. ONSET: \"When did the COVID-19 symptoms start? \"       Yesterday evening     4. WORST SYMPTOM: \"What is your worst symptom? \" (e.g., cough, fever, shortness of breath, muscle aches)      Congestion, sore throat, loss of smell    5. COUGH: \"Do you have a cough? \" If so, ask: \"How bad is the cough? \"        Yes    6. FEVER: \"Do you have a fever? \" If so, ask: \"What is your temperature, how was it measured, and when did it start? \"      No     7. RESPIRATORY STATUS: \"Describe your breathing? \" (e.g., shortness of breath, wheezing, unable to speak)       No     8. BETTER-SAME-WORSE: [employer] [employer] you getting better, staying the same or getting worse compared to yesterday? \"  If getting worse, ask, \"In what way? \"      Same     9. HIGH RISK DISEASE: \"Do you have any chronic medical problems? \" (e.g., asthma, heart or lung disease, weak immune system, obesity, etc.)      Obesity     10. PREGNANCY: \"Is there any chance you are pregnant? \" \"When was your last menstrual period? \"        No     11. OTHER SYMPTOMS: \"Do you have any other symptoms? \"  (e.g., chills, fatigue, headache, loss of smell or taste, muscle pain, sore throat; new loss of smell or taste especially support the diagnosis of COVID-19)        No    Protocols used: CORONAVIRUS (COVID-19) DIAGNOSED OR SUSPECTED-ADULT-AH    Received call from Myrna Arceo at pre-service center [facility] with [employer] Complaint. Brief description of triage: cough, congestion, loss of smell. Triage indicates for patient to be tested in 5-7 days after symptoms began. Care advice provided, patient verbalizes understanding; denies any other questions or concerns; instructed to call back for any new or worsening symptoms. Writer provided warm transfer to Seema Gamboa at [facility] for appointment scheduling. Attention Provider: Thank you for allowing me to participate in the care of your patient. The patient was connected to triage in response to information provided to the E[facility]. Please do not respond through this encounter as the response is not directed to a shared pool.

## 2021-04-28 ENCOUNTER — TELEPHONE (OUTPATIENT)
Dept: OBGYN CLINIC | Age: 31
End: 2021-04-28

## 2021-04-28 RX ORDER — FLUCONAZOLE 150 MG/1
150 TABLET ORAL ONCE
Qty: 2 TABLET | Refills: 0 | Status: SHIPPED | OUTPATIENT
Start: 2021-04-28 | End: 2021-04-28

## 2021-04-29 ENCOUNTER — TELEPHONE (OUTPATIENT)
Dept: OBGYN CLINIC | Age: 31
End: 2021-04-29

## 2021-05-26 ENCOUNTER — OFFICE VISIT (OUTPATIENT)
Dept: BARIATRICS/WEIGHT MGMT | Age: 31
End: 2021-05-26
Payer: MEDICARE

## 2021-05-26 VITALS
HEIGHT: 66 IN | WEIGHT: 293 LBS | DIASTOLIC BLOOD PRESSURE: 76 MMHG | SYSTOLIC BLOOD PRESSURE: 120 MMHG | HEART RATE: 100 BPM | RESPIRATION RATE: 22 BRPM | BODY MASS INDEX: 47.09 KG/M2

## 2021-05-26 DIAGNOSIS — R06.09 DYSPNEA ON EXERTION: ICD-10-CM

## 2021-05-26 DIAGNOSIS — K21.9 GASTROESOPHAGEAL REFLUX DISEASE WITHOUT ESOPHAGITIS: Primary | ICD-10-CM

## 2021-05-26 DIAGNOSIS — E66.01 MORBID OBESITY WITH BMI OF 45.0-49.9, ADULT (HCC): ICD-10-CM

## 2021-05-26 PROCEDURE — G8428 CUR MEDS NOT DOCUMENT: HCPCS | Performed by: SURGERY

## 2021-05-26 PROCEDURE — G8417 CALC BMI ABV UP PARAM F/U: HCPCS | Performed by: SURGERY

## 2021-05-26 PROCEDURE — 99204 OFFICE O/P NEW MOD 45 MIN: CPT | Performed by: SURGERY

## 2021-05-26 PROCEDURE — 1036F TOBACCO NON-USER: CPT | Performed by: SURGERY

## 2021-05-26 NOTE — LETTER
Visit Date: 2021    Patient: Sid Ross  YOB: 1990    Dear Dr. Chandrika Leal, APRN - CNP,      I had the pleasure of seeing Jairo Miranda in the office today for a consult for weight loss surgery. Her current Weight: (!) 303 lb (137.4 kg), which gives her a Body mass index is 49.65 kg/m². .  We had a long discussion regarding surgical options for weight loss and improvement in co-morbidities. She is considering a bariatric  procedure. We will start the preoperative workup, which includes bloodwork, psychological evaluation, support group attendance, and preoperative medical clearance from you. We will also initiate pre-certification for surgery, which requires a letter of medical necessity from you and often office notes documenting a weight history and co-morbidities. Jairo Miranda will require 3 months of medically supervised visits as specified by the patient's insurance. Thank you for allowing me to participate in the care of your patient. If you have any questions or concerns, please do not hesitate to call.       Sincerely,     Aminah Oh DO  Director of Bariatric and Minimally Invasive Surgery  MARTINA JOLLY [facility]  [address], [facility], [address]  [place] [phone]  F: [phone]

## 2021-05-31 NOTE — PROGRESS NOTES
unknown- Lung     Thyroid Disease Maternal Grandmother     Diabetes Maternal Grandfather     Cancer Maternal Grandfather         throat    Hypertension Maternal Grandfather     Depression Mother     Cancer Maternal Aunt         stomach    Cancer Maternal Uncle         prostate    Stroke Maternal Uncle     Diabetes Maternal Uncle        Social History:   Social History     Tobacco Use    Smoking status: Never Smoker    Smokeless tobacco: Never Used   Vaping Use    Vaping Use: Never used   Substance Use Topics    Alcohol use: No    Drug use: No       Current Med List:  No current outpatient medications on file. No current facility-administered medications for this visit. Allergies   Allergen Reactions    Latex Dermatitis       SOCIAL:      This patient is alone for the evaluation today. [] HIV Risk Factors (i.e.) intravenous drug abuser; at risk sexual behavior; received blood products    [] TB Risk Factors (i.e.) Medically underserved, institutional care, foreign born, endemic area; exposure to active case    [] Hepatitis B&C Risk Factors (i.e.) Received blood transfusion prior to 1992; recreational drug use; high risk sexual behaviors; tattoos or body piercings; contact with blood or needle sticks in the workplace    Comprehension    Ability to grasp concepts and respond to questions:   [x] High   [] Medium   [] Low    Motivation    [x] Asks Questions; eager to learn   [] Needs education   [] Extreme anxiety    [] uncooperative   [] Denies need for education    English Speaking Ability    [x] Speaks English well   [x] Reads English well   [x] Understands spoken english    [x] Understands written English   [x] No need for interpretive support      [] Might benefit from interpretive support   []  required for all services     REVIEW OF SYSTEMS: (Negative unless marked otherwise)       Do you or have you had any of the following?   Cardiovascular YES NO Respiratory YES NO   High Blood Pressure   []   [] COPD   []   []   Heart Attack   []   [] TB/Positive skin Test   []   []   Congestive Heart Failure   []   [] Obstructive Sleep Apnea   []   []   Coronary Artery Disease   []   [] Asthma   []   [x]   Circulation Problems   []   []      Activity Intolerance   []   [] Gastrointestinal YES NO   Peripheral Vascular Disease   []   [] Gastric Problems   []   []        Colorectal problems   []   []   Hematological YES NO Ulcer disease   []   []   Bleeding Tendencies   []   [] Liver disease   []   []   Blood Transfusion last 30d   []   [] Gallstones   []   []   Anemia   []   [] Refulx or Heartburn   []   []   Blood Clots   []   []      High Cholesterol   []   [] Muscoloskeletal YES NO   High Triglycerides   []   [] Joint Limitations   []   []      Muscle Weakness   []   []   Eyes, Ears, Nose, Throat YES NO Multiple Sclerosis   []   []   Cataracts   []   [] Arthritis   []   []   Glasses   []   []      Blurred Vision   []   [] Cancer   []   []   Hearing Aids   []   [] Type:     Ringing in Ears   []   []      Difficulty Swallowing   []   [] Encodrine YES NO      Diabetes   []   []   Neurological YES NO Thyroid   []   []   Stroke   []   []      Seizure   []   [] Psychiatric Disorder YES NO   Dizziness/Blackouts/Fainting   []   [] Depression   []   []   Memory Impairement   []   [] Bipolar   []   []   Parkinson's   []   [] Anxiety disorder   []   []           Genitourinary/Gyn YES NO Skin Intact   [x]   []   Urinary Infection   []   []      Stones   []   [] Sleep   YES NO   Kidney Disease   []   [] Excessive daytime sleepiness   [x]   []   Incontinent   []   [] Snoring   [x]   []   Irregular menstrual cycles   []   [] Unrefreshed sleep   [x]   []   Possibly Pregnant? []     [] Other:      Date of LMP:   Preferred location:            PRESENT ILLNESS:     Weight Parameters  Weight (!) 303 lb (137.4 kg)   Height 5' 5.5\" (1.664 m)   BMI Body mass index is 49.65 kg/m².    IBW     EBW IMMUNIZATION STATUS  Immunization History   Administered Date(s) Administered    Influenza, Quadv, IM, PF (6 mo and older Fluzone, Flulaval, Fluarix, and 3 yrs and older Afluria) 11/07/2016, 11/22/2019       FALLS ASSESSMENT    [x] LOW RISK FOR FALLS    [] MODERATE RISK FOR FALLS    [] Difficulty walking/selfcare    [] Falls in the past 2 months    [] Suspicion of Clinician    [] Other:      Anthony Panning     [x] Not needed     [] Instructed to stop smoking    [] Pamphlet community resources given     VTE SCREEN    [] Family hx DVT/PE  /   [] Personal hx of DVT/PE    [x] Denies any family or personal hx of DVT/PE    Physician Review    [x] Past medical, family, & social history reviewed and discussed with patient. Review of surgery and post-surgical changes (by surgeon for surgical patients only)    [x] Lifelong diet expectations reviewed with patient    [x] Need for lifelong vitamin supplementation reviewed with patient    PHYSICAL EXAMINATION:      /76 (Site: Right Upper Arm, Position: Sitting, Cuff Size: Large Adult)   Pulse 100   Resp 22   Ht 5' 5.5\" (1.664 m)   Wt (!) 303 lb (137.4 kg)   BMI 49.65 kg/m²     Constitutional:  Vital signs are normal. The patient appears well-developed   HEENT:      Head: Normocephalic. Atraumatic     Eyes: pupils are equal and reactive. No scleral icterus is present. Neck: No mass and no thyromegaly present. Cardiovascular: Normal rate, regular rhythm, S1 normal and S2 normal.  Bilateral pulses present. Pulmonary/Chest: Effort normal and breath sounds normal. No retractions. Abdominal: Soft. Normal appearance. There is no organomegaly. No tenderness. There is no rigidity, no rebound, no guarding and no Gao's sign. Musculoskeletal:      Right lower leg: Normal. No tenderness and no edema. Left lower leg: Normal. No tenderness and no edema. Lymphadenopathy:     No cervical adenopathy, No Exrtemity Adenopathy.    Neurological: The patient is Endoscopy for GERD which has been untreated. Psychological Assessment: Psychological Evaluation and Clearance to rule out eating disorder    Nutrition Assessment: Bariatric Nutrition Assessment and Clearance    Other  Consultations: Medical clearance with BMI of 50 kg/m2 and dyspnea on exertion    Physician Supervised Diet and Exercise required by the patients insurance company: 3 months.       Surgical Diet requirement:  2 weeks    Final Testing  Screening Chest Xray  and EKG within 6 months of date of surgery    Labwork:  Final Lab Tests  within 3 months of date of surgery (CBC, PT/PTT, BMP)     Electronically signed by Cris Cardona DO on 5/31/2021 at 2:12 PM

## 2021-06-24 ENCOUNTER — NURSE ONLY (OUTPATIENT)
Dept: BARIATRICS/WEIGHT MGMT | Age: 31
End: 2021-06-24

## 2021-06-24 VITALS — WEIGHT: 293 LBS | BODY MASS INDEX: 50.31 KG/M2

## 2021-06-24 NOTE — PROGRESS NOTES
Medical Nutrition Therapy  Initial Nutrition Assessment for Metabolic/ Bariatric Surgery  Required insurance visit prior to surgery:  3  Shared with patient the importance of documenting exercise and staying at or below start weight during visits. Pt reports: Spencer Coyne is a 31 y.o. female with a date of birth of 1990. There were no vitals filed for this visit. BMI: There is no height or weight on file to calculate BMI. Obesity Classification: Class III    Weight History: Wt Readings from Last 3 Encounters:   05/26/21 (!) 303 lb (137.4 kg)   12/09/20 290 lb (131.5 kg)   10/06/20 285 lb (129.3 kg)        How does your weight affect your daily activities?back pain and short of breath with activity      What would be different in your life if you felt healthier and fit? Why is that important to you now? Do you drink alcohol? . no    Do you use tobacco in the form of cigarettes, cigars, chew or any vapor appliance? No    Weight History      Amada Avalos's highest adult weight was 360 lbs at age 26. Amada Avalos's lowest adult weight was 160 lbs at age 14. Physical Activity  Do you participate in a structured exercise program, step counting or regular physical activity? no      Instructions and exercise logs were provided to patient today see goal sheet and plan. Previous weight loss attempts  Patient has participated in the following weight loss programs:   lowfat diet and RD counseling      Nutrition History  Have you ever been diagnosed with an eating disorder? No  Have you ever had problems tolerating a multivitamin or mineral supplement?no  Have you ever been diagnosed with a vitamin or mineral deficiency? no   P Patient does not have Mosque/cultural food concerns. Patient dines out to a sit down restaurant 3 times per month. Patient dines out to a fast food restaurant 2 times per month. Patient does have grazing.    Patient does have night eating. Patient does not have a history of emotional eating. Patient does not have a history of  eating out of boredom. Drinks throughout the day: Gatorade zero and vitamin water zero    24 hour recall/food frequency: has been scanned into chart unless completed below. Surgery  Patient's greatest concern about having surgery is: Ángelasandra Barnes How will you know when you've been successful? Patient has attended an information session where the long term changes of MBS were presented. Rating on a scale of 0-10 with 0 being none and 10 being the highest you have ever felt rate each of the three areas:    ability How confident are you that you can change now?    willingness How important is it for you to change at this time? Readiness How ready are you to change at this time? Assessment:  Nutritional Needs:  Men: 1500kcal daily minimum     Women 1200kcal daily minimum. 60-80gm of protein daily  PES Statement:  Obesity related to a complex combination of decreased energy needs, disordered eating patterns, physical inactivity, and increased psychological/life stress as evidenced by BMI There is no height or weight on file to calculate BMI. and inability to maintain a significant amount of weight loss through conventional weight loss interventions. Goals    All goals were planned with and agreed on by the patient. I want to improve my health because I want to live longer! appt # NA G What is your next step? C 1 2 3 4 5 6 7 8 9     0  one I will read the education binder provided to me and the                   2 I will make my pschological evaluation appoinment. x              0  3 I will bring this goal card to every appointment. x 4 I will eliminate all tobacco/nicotine. x 5 I will limit alcoholic beverages to 8-0YN per week. x 6 I will limit dining out to 3 times per week or less.                 x 7 I will eliminate sugary beverages. x 8 I will eliminate carbonated beverages. x 9 I will eliminate drinking with a straw. x 10 I will limit caffeinated beverages to 16oz daily. x 11 I will limit cold cereals prepared with milk. x 12 I will do a 5 minute reflection. x 13 I will food journal daily. 0  14 I will log my exercise daily. 15 I will determine my  calcium and multivitamin plan. 16 I will purchase multivitamin. 17 I will start taking multivitamins following my plan. 18 I will have 1-2 servings of protein present at each meal.                 19 I will eat every 3-5 hours. 0  20 I will drink 64oz of fluid daily. 3-4 bottles water/day                 21 I will follow the 15-30-15 guideline. 22 I will eat protein first at all meals followed by vegetables  Fruit and lastly whole grains. 21 My first one diet neutral approach is:                 24 my second diet neutral approach is:                 25 My third diet neutral approach is:                   Do you understand your goals? y    Do you have the information you need to achieve your goals? y    Do you have any questions  right now? n        Plan    Exercise for Health 15 easy exercises to do at home with 6 activity logs were provided to the patient with verbal and written instructions on how to carry this out. Goal number 14 was provided to the patient on this visit please see above. Will follow up each month and provide support as patient begins to add physical activity to life style. Monitor and review goals adjust as needed. Follow up monthly supervised diet and exercise.        Wanda Guzman, ALON, PORTILLO

## 2021-06-28 ENCOUNTER — TELEPHONE (OUTPATIENT)
Dept: FAMILY MEDICINE CLINIC | Age: 31
End: 2021-06-28

## 2021-06-28 NOTE — TELEPHONE ENCOUNTER
----- Message from PRADIP Bellamy CNP sent at 6/28/2021  9:52 AM EDT -----  Can you send warning letter for no shows please?  Patient has had 3 w/ myself (9/9 , 11/11 today 6/28)

## 2021-06-28 NOTE — LETTER
[facility]  [address] [facility] [address] [address]  Phone: [phone]  Fax: [phone]    PRADIP Jackson CNP        June 28, 2021     [place]  [address] [address] [address]      Dear Rc Allen: We are sorry that you missed your appointment with Doniace Nisha on 6/28/2021. Please note you have 3 no shows in our office dated 09/09/20, 11/11/20 and 06/28/21. Your health and follow-up medical care are important to us. Please call our office as soon as possible so that we may reschedule your appointment. If you have already rescheduled your appointment, please disregard this letter.     Sincerely,        PRADIP Jackson CNP

## 2021-07-23 ENCOUNTER — OFFICE VISIT (OUTPATIENT)
Dept: BARIATRICS/WEIGHT MGMT | Age: 31
End: 2021-07-23
Payer: MEDICARE

## 2021-07-23 VITALS
BODY MASS INDEX: 47.09 KG/M2 | SYSTOLIC BLOOD PRESSURE: 107 MMHG | HEART RATE: 62 BPM | WEIGHT: 293 LBS | DIASTOLIC BLOOD PRESSURE: 70 MMHG | HEIGHT: 66 IN

## 2021-07-23 DIAGNOSIS — K21.9 GASTROESOPHAGEAL REFLUX DISEASE, UNSPECIFIED WHETHER ESOPHAGITIS PRESENT: Primary | ICD-10-CM

## 2021-07-23 DIAGNOSIS — E66.01 MORBID OBESITY (HCC): ICD-10-CM

## 2021-07-23 DIAGNOSIS — Z90.3 HISTORY OF SLEEVE GASTRECTOMY: ICD-10-CM

## 2021-07-23 PROBLEM — E66.813 CLASS 3 SEVERE OBESITY DUE TO EXCESS CALORIES WITHOUT SERIOUS COMORBIDITY WITH BODY MASS INDEX (BMI) OF 60.0 TO 69.9 IN ADULT: Status: RESOLVED | Noted: 2019-05-01 | Resolved: 2021-07-23

## 2021-07-23 PROCEDURE — G8427 DOCREV CUR MEDS BY ELIG CLIN: HCPCS | Performed by: NURSE PRACTITIONER

## 2021-07-23 PROCEDURE — 99213 OFFICE O/P EST LOW 20 MIN: CPT | Performed by: NURSE PRACTITIONER

## 2021-07-23 PROCEDURE — G8417 CALC BMI ABV UP PARAM F/U: HCPCS | Performed by: NURSE PRACTITIONER

## 2021-07-23 PROCEDURE — 1036F TOBACCO NON-USER: CPT | Performed by: NURSE PRACTITIONER

## 2021-07-23 RX ORDER — FAMOTIDINE 20 MG/1
20 TABLET, FILM COATED ORAL NIGHTLY
Qty: 30 TABLET | Refills: 3 | Status: SHIPPED | OUTPATIENT
Start: 2021-07-23 | End: 2022-04-08

## 2021-07-23 RX ORDER — PANTOPRAZOLE SODIUM 40 MG/1
40 TABLET, DELAYED RELEASE ORAL DAILY
Qty: 30 TABLET | Refills: 3 | Status: SHIPPED | OUTPATIENT
Start: 2021-07-23 | End: 2022-04-08

## 2021-07-23 NOTE — PROGRESS NOTES
Medical Nutrition Therapy   Metabolic and Bariatric Surgery         Supervised diet and exercise preparation  Visit 1 out of 3  Pt reports:  No exercise log as was ill; pt has good restriction     Changes in eating patterns to promote health are noted below on the goals number 22-25    Vitals: Wt Readings from Last 3 Encounters:   07/23/21 (!) 303 lb (137.4 kg)   06/24/21 (!) 307 lb (139.3 kg)   05/26/21 (!) 303 lb (137.4 kg)         Nutrition Assessment:   PES: Knowledge deficit related to healthy behaviors that support weight management post weight loss surgery as evidenced by Body mass index is 49.65 kg/m². Nutrition Assessment of Goal Attainment:  TREATMENT GOALS:    1. Pt  Completed 3 out of 4 goals. 2.TREATMENT GOALS FOR UPCOMING WEEK: continue all previous goals and add: # 19    All goals were planned with and agreed on by the patient. I want to improve my health because I want to live longer! appt # NA G What is your next step? C 1 2 3 4 5 6 7 8 9     1  one I will read the education binder provided to me and the    10               2 I will make my pschological evaluation appoinment. x              1  3 I will bring this goal card to every appointment. 100              x 4 I will eliminate all tobacco/nicotine. x 5 I will limit alcoholic beverages to 9-6IM per week. x 6 I will limit dining out to 3 times per week or less. x 7 I will eliminate sugary beverages. x 8 I will eliminate carbonated beverages. x 9 I will eliminate drinking with a straw. x 10 I will limit caffeinated beverages to 16oz daily. x 11 I will limit cold cereals prepared with milk. x 12 I will do a 5 minute reflection. x 13 I will food journal daily. 1  14 I will log my exercise daily. 0               15 I will determine my  calcium and multivitamin plan. 16 I will purchase multivitamin. 17 I will start taking multivitamins following my plan. 18 I will have 1-2 servings of protein present at each meal.                 19 I will eat every 3-5 hours. 0  20 I will drink 64oz of fluid daily. 3-4 bottles water/day x 100               21 I will follow the 15-30-15 guideline. 22 I will eat protein first at all meals followed by vegetables  Fruit and lastly whole grains. 1  23 My first one diet neutral approach is:  I will follow bariatric behaviors. 24 my second diet neutral approach is:                 25 My third diet neutral approach is:                                                                        Do you understand your goals? y    Do you have the information you need to achieve your goals? y    Do you have any questions  right now? n        [x]  Consistent goal achievement in the program thus far and further success with goals is expected. []  Unable to consistently make progress in goal achievement. At this time patient is not moving forward  in developing the skills needed for success after surgery. Plan:    Continue to follow monthly and review goals.          [x]  Nutrition visits complete    []

## 2021-07-23 NOTE — PROGRESS NOTES
Medical Weight Management Progress Note    Subjective     Patient being seen for medically supervised weight loss for the chronic conditions of GERD. She had sleeve gastrectomy at another facility in 2019. Highest weight was 360 lbs and got down to 250 lbs. She developed GERD and has had weight regain. She is working on the behavior changes discussed at the initial appointment. Patient continues on diet plan. Physical activity includes walking. Weight loss since last visit is 0 lbs. Psych eval scheduled 8/10/21. Needs to schedule EGD. No current issues. Working toward bariatric surgery:    [] Sleeve Gastrectomy                                                           [] Jesus-en-Y Gastric Bypass               [x] Revision of Sleeve Gastrectomy to Jesus-en-Y Gastric Bypass    Allergies: Allergies   Allergen Reactions    Latex Dermatitis       Past Medical History:     Past Medical History:   Diagnosis Date    Abnormal Pap smear of cervix 6/15/8991    Anemia complicating pregnancy in second trimester 3/11/2015    Gastroesophageal reflux disease 2019   .     Past Surgical History:  Past Surgical History:   Procedure Laterality Date     SECTION  07/20/15    GASTRIC BAND  2019    GASTRIC SLEEVE    UPPER GASTROINTESTINAL ENDOSCOPY N/A 2019    EGD BIOPSY performed by Sony Keller MD at [address] [place] [address]       Family History:  Family History   Problem Relation Age of Onset    Cancer Paternal Grandfather         unknown- Lung     Thyroid Disease Maternal Grandmother     Diabetes Maternal Grandfather     Cancer Maternal Grandfather         throat    Hypertension Maternal Grandfather     Depression Mother     Cancer Maternal Aunt         stomach    Cancer Maternal Uncle         prostate    Stroke Maternal Uncle     Diabetes Maternal Uncle        Social History:  Social History     Socioeconomic History    Marital status: Single     Spouse name: Not on file    Number of children: Not on file    Years of education: Not on file    Highest education level: Not on file   Occupational History    Not on file   Tobacco Use    Smoking status: Never Smoker    Smokeless tobacco: Never Used   Vaping Use    Vaping Use: Never used   Substance and Sexual Activity    Alcohol use: No    Drug use: No    Sexual activity: Yes     Partners: Male     Birth control/protection: I.U.D. Other Topics Concern    Not on file   Social History Narrative    Not on file     Social Determinants of Health     Financial Resource Strain:     Difficulty of Paying Living Expenses:    Food Insecurity:     Worried About Running Out of Food in the Last Year:     [address] in the Last Year:    Transportation Needs:     Lack of Transportation (Medical):  Lack of Transportation (Non-Medical):    Physical Activity:     Days of Exercise per Week:     Minutes of Exercise per Session:    Stress:     Feeling of Stress :    Social Connections:     Frequency of Communication with Friends and Family:     Frequency of Social Gatherings with Friends and Family:     Attends Jain Services:     Active Member of Clubs or Organizations:     Attends Club or Organization Meetings:     Marital Status:    Intimate Partner Violence:     Fear of Current or Ex-Partner:     Emotionally Abused:     Physically Abused:     Sexually Abused:        Current Medications:  Current Outpatient Medications   Medication Sig Dispense Refill    pantoprazole (PROTONIX) 40 MG tablet Take 1 tablet by mouth daily 30 tablet 3    famotidine (PEPCID) 20 MG tablet Take 1 tablet by mouth nightly 30 tablet 3     No current facility-administered medications for this visit. Vital Signs:  /70 (Site: Right Upper Arm, Position: Sitting, Cuff Size: Large Adult)   Pulse 62   Ht 5' 5.5\" (1.664 m)   Wt (!) 303 lb (137.4 kg)   BMI 49.65 kg/m²     BMI/Height/Weight:  Body mass index is 49.65 kg/m².       Review of Order Specific Question:   No of Hours?      Answer:   12    Lipid Panel     Standing Status:   Future     Standing Expiration Date:   7/23/2022     Order Specific Question:   Is Patient Fasting?/# of Hours     Answer:   12    Magnesium     Standing Status:   Future     Standing Expiration Date:   7/23/2022    PTH, Intact     Standing Status:   Future     Standing Expiration Date:   7/23/2022    TSH without Reflex     Standing Status:   Future     Standing Expiration Date:   7/23/2022    Urine Drug Screen     Standing Status:   Future     Standing Expiration Date:   7/23/2022    Vitamin A     Standing Status:   Future     Standing Expiration Date:   7/23/2022    Vitamin B1     Standing Status:   Future     Standing Expiration Date:   7/23/2022    Vitamin B12 & Folate     Standing Status:   Future     Standing Expiration Date:   7/23/2022    Vitamin D 25 Hydroxy     Standing Status:   Future     Standing Expiration Date:   7/23/2022    Zinc     Standing Status:   Future     Standing Expiration Date:   7/23/2022       Prescriptions this encounter:  Orders Placed This Encounter   Medications    pantoprazole (PROTONIX) 40 MG tablet     Sig: Take 1 tablet by mouth daily     Dispense:  30 tablet     Refill:  3    famotidine (PEPCID) 20 MG tablet     Sig: Take 1 tablet by mouth nightly     Dispense:  30 tablet     Refill:  3       Electronically signed by:  Dave Burns CNP

## 2021-07-28 ENCOUNTER — OFFICE VISIT (OUTPATIENT)
Dept: FAMILY MEDICINE CLINIC | Age: 31
End: 2021-07-28
Payer: MEDICARE

## 2021-07-28 VITALS
HEART RATE: 67 BPM | OXYGEN SATURATION: 99 % | SYSTOLIC BLOOD PRESSURE: 122 MMHG | TEMPERATURE: 97.6 F | WEIGHT: 293 LBS | BODY MASS INDEX: 50.21 KG/M2 | DIASTOLIC BLOOD PRESSURE: 80 MMHG

## 2021-07-28 DIAGNOSIS — E66.01 MORBID OBESITY (HCC): ICD-10-CM

## 2021-07-28 DIAGNOSIS — M67.431 GANGLION CYST OF WRIST, RIGHT: Primary | ICD-10-CM

## 2021-07-28 PROCEDURE — 99213 OFFICE O/P EST LOW 20 MIN: CPT | Performed by: NURSE PRACTITIONER

## 2021-07-28 PROCEDURE — G8417 CALC BMI ABV UP PARAM F/U: HCPCS | Performed by: NURSE PRACTITIONER

## 2021-07-28 PROCEDURE — G8427 DOCREV CUR MEDS BY ELIG CLIN: HCPCS | Performed by: NURSE PRACTITIONER

## 2021-07-28 PROCEDURE — 1036F TOBACCO NON-USER: CPT | Performed by: NURSE PRACTITIONER

## 2021-07-28 SDOH — ECONOMIC STABILITY: FOOD INSECURITY: WITHIN THE PAST 12 MONTHS, THE FOOD YOU BOUGHT JUST DIDN'T LAST AND YOU DIDN'T HAVE MONEY TO GET MORE.: NEVER TRUE

## 2021-07-28 SDOH — ECONOMIC STABILITY: FOOD INSECURITY: WITHIN THE PAST 12 MONTHS, YOU WORRIED THAT YOUR FOOD WOULD RUN OUT BEFORE YOU GOT MONEY TO BUY MORE.: NEVER TRUE

## 2021-07-28 ASSESSMENT — PATIENT HEALTH QUESTIONNAIRE - PHQ9
1. LITTLE INTEREST OR PLEASURE IN DOING THINGS: 0
SUM OF ALL RESPONSES TO PHQ QUESTIONS 1-9: 0
2. FEELING DOWN, DEPRESSED OR HOPELESS: 0
SUM OF ALL RESPONSES TO PHQ9 QUESTIONS 1 & 2: 0

## 2021-07-28 ASSESSMENT — ENCOUNTER SYMPTOMS
ALLERGIC/IMMUNOLOGIC NEGATIVE: 1
GASTROINTESTINAL NEGATIVE: 1
COUGH: 0
SHORTNESS OF BREATH: 0
WHEEZING: 0
EYES NEGATIVE: 1
CHEST TIGHTNESS: 0
RESPIRATORY NEGATIVE: 1
COLOR CHANGE: 0
VOMITING: 0
NAUSEA: 0
DIARRHEA: 0

## 2021-07-28 ASSESSMENT — SOCIAL DETERMINANTS OF HEALTH (SDOH): HOW HARD IS IT FOR YOU TO PAY FOR THE VERY BASICS LIKE FOOD, HOUSING, MEDICAL CARE, AND HEATING?: VERY HARD

## 2021-07-28 NOTE — PROGRESS NOTES
for symptoms at home     Following with bariatric surgery   S/p gastric sleeve 2019  Planning on having revision / bypass done later this year  Needs a letter to support this   Has tried weight watchers & gastric sleeve in the past alongside diet pills     Health Maintenance:      Subjective:     Review of Systems   Constitutional: Negative. Negative for appetite change, chills, diaphoresis, fatigue, fever and unexpected weight change. HENT: Negative. Eyes: Negative. Respiratory: Negative. Negative for cough, chest tightness, shortness of breath and wheezing. Cardiovascular: Negative. Negative for chest pain and leg swelling. Gastrointestinal: Negative. Negative for diarrhea, nausea and vomiting. Endocrine: Negative. Genitourinary: Negative. Negative for difficulty urinating. Musculoskeletal: Negative. Skin: Negative. Negative for color change, pallor, rash and wound. Allergic/Immunologic: Negative. Neurological: Negative. Negative for seizures, syncope, facial asymmetry, speech difficulty and weakness. Hematological: Negative. Psychiatric/Behavioral: Negative. Negative for dysphoric mood. The patient is not nervous/anxious. Objective:     Vitals:    07/28/21 1041   BP: 122/80   Pulse: 67   Temp: 97.6 °F (36.4 °C)   SpO2: 99%       Body mass index is 50.21 kg/m². Physical Exam  Constitutional:       General: She is not in acute distress. Appearance: Normal appearance. She is well-developed. She is obese. She is not ill-appearing, toxic-appearing or diaphoretic. HENT:      Head: Normocephalic and atraumatic. Right Ear: External ear normal.      Left Ear: External ear normal.      Nose: Nose normal.   Eyes:      General: No scleral icterus. Right eye: No discharge. Left eye: No discharge. Conjunctiva/sclera: Conjunctivae normal.      Pupils: Pupils are equal, round, and reactive to light. Neck:      Trachea: No tracheal deviation. Cardiovascular:      Rate and Rhythm: Normal rate and regular rhythm. Heart sounds: Normal heart sounds. No murmur heard. No friction rub. No gallop. Pulmonary:      Effort: Pulmonary effort is normal. No tachypnea, accessory muscle usage or respiratory distress. Breath sounds: Normal breath sounds. No stridor. No decreased breath sounds, wheezing, rhonchi or rales. Abdominal:      Palpations: Abdomen is soft. Musculoskeletal:         General: No tenderness. Normal range of motion. Right wrist: Swelling and deformity present. No effusion, lacerations, tenderness, bony tenderness, snuff box tenderness or crepitus. Normal range of motion. Normal pulse. Hands:       Cervical back: Normal range of motion and neck supple. Skin:     General: Skin is warm and dry. Coloration: Skin is not pale. Findings: No erythema or rash. Neurological:      Mental Status: She is alert and oriented to person, place, and time. GCS: GCS eye subscore is 4. GCS verbal subscore is 5. GCS motor subscore is 6. Gait: Gait is intact. Gait normal.   Psychiatric:         Speech: Speech normal.         Behavior: Behavior normal.         Thought Content: Thought content normal.         Judgment: Judgment normal.           Assessment:         1. Ganglion cyst of wrist, right    2. Morbid obesity ([address]        Plan:     1. Ganglion cyst of wrist, right    - Jennifer Long MD, Orthopedic Surgery, [place]    Surgeon referral for removal     2. Morbid obesity ([address]    Continue to follow w/ Weight Management  Letter completed for patient     COVID 19 vaccine discussed / encouraged     Discussed use, benefit, and side effects of prescribed medications. All patient questions answered. Pt voiced understanding. Reviewed health maintenance. Instructed to continue current medications, diet and exercise. Patient agreedwith treatment plan. Follow up as directed.      Electronically signed by Alphonse River Lauren Brandt - MELISSA on 7/28/2021

## 2021-07-28 NOTE — LETTER
Clarence Cohn, MSN, FNP-C  [facility]  [address] [place] [address]  [address]  [place] [phone]          2021      RE: Loree Kussmaul D.O.B. 1990    To Whom it may Concern,    The above named patient has been seen by our office for two years. She suffers from the following co morbidities: pre-diabetes, GERD, obesity. Her current weight is 306 lbs with a BMI of 50.21. The patient has under gone the following weight loss attempts: gastric sleeve, weight watchers, diet medications in the past. I feel this patient would benefit from weight loss surgery revision because her medical conditions will become life threatening if she does not get help getting her weight under control. I appreciate your consideration for approval. Feel free to contact me with any further information.      Sincerely,        DICK La, FNP-C

## 2021-08-05 ENCOUNTER — NURSE ONLY (OUTPATIENT)
Dept: BARIATRICS/WEIGHT MGMT | Age: 31
End: 2021-08-05

## 2021-08-11 ENCOUNTER — OFFICE VISIT (OUTPATIENT)
Dept: ORTHOPEDIC SURGERY | Age: 31
End: 2021-08-11
Payer: MEDICARE

## 2021-08-11 VITALS — WEIGHT: 293 LBS | BODY MASS INDEX: 48.82 KG/M2 | HEIGHT: 65 IN

## 2021-08-11 DIAGNOSIS — R22.31 MASS OF WRIST, RIGHT: ICD-10-CM

## 2021-08-11 DIAGNOSIS — M25.531 RIGHT WRIST PAIN: Primary | ICD-10-CM

## 2021-08-11 PROCEDURE — G8417 CALC BMI ABV UP PARAM F/U: HCPCS | Performed by: PHYSICIAN ASSISTANT

## 2021-08-11 PROCEDURE — G8427 DOCREV CUR MEDS BY ELIG CLIN: HCPCS | Performed by: PHYSICIAN ASSISTANT

## 2021-08-11 PROCEDURE — 1036F TOBACCO NON-USER: CPT | Performed by: PHYSICIAN ASSISTANT

## 2021-08-11 PROCEDURE — 99204 OFFICE O/P NEW MOD 45 MIN: CPT | Performed by: PHYSICIAN ASSISTANT

## 2021-08-11 NOTE — PROGRESS NOTES
[facility], [address] [place], [address] [place] [address], [address] [facility]           Dept Phone: [phone]           Dept Fax:  [phone] [facility], [place]          Dept Phone: [phone]           Dept Fax:  [phone]      Chief Compliant:  Chief Complaint   Patient presents with    Wrist Pain     right        History of Present Illness: This is a 31 y.o. female who presents to the clinic today for evaluation of mass to the right wrist.  Patient reports she initially noticed this approximately 1 year ago this is to the volar radial wrist.  No injury or trauma prior to the onset of the swelling and mass. She states she has very minimal pain associated with this this is more of a cosmetic concern. Patient reports it has not waxed and waned has been present ever since she initially noticed it 1 year ago and has appears to gotten larger in size. She states it is firm to palpation but it is nontender she denies any overlying redness or warmth associate with this area. She does note intermittent numbness and tingling with certain movements throughout the day but but denies any nocturnal paresthesias. She has not tried any bracing or any oral medication for this problem.     Past History:    Current Outpatient Medications:     pantoprazole (PROTONIX) 40 MG tablet, Take 1 tablet by mouth daily, Disp: 30 tablet, Rfl: 3    famotidine (PEPCID) 20 MG tablet, Take 1 tablet by mouth nightly, Disp: 30 tablet, Rfl: 3  Allergies   Allergen Reactions    Latex Dermatitis     Social History     Socioeconomic History    Marital status: Single     Spouse name: Not on file    Number of children: Not on file    Years of education: Not on file    Highest education level: Not on file   Occupational History    Not on file   Tobacco Use    Smoking status: Never Smoker    Smokeless tobacco: Never Used   Vaping Use    Vaping Use: Never used   Substance and Sexual Activity    Alcohol use: No    Drug use: No    Sexual activity: Yes     Partners: Male     Birth control/protection: I.U.D. Other Topics Concern    Not on file   Social History Narrative    Not on file     Social Determinants of Health     Financial Resource Strain: High Risk    Difficulty of Paying Living Expenses: Very hard   Food Insecurity: No Food Insecurity    Worried About Running Out of Food in the Last Year: Never true    Amber of Food in the Last Year: Never true   Transportation Needs:     Lack of Transportation (Medical):      Lack of Transportation (Non-Medical):    Physical Activity:     Days of Exercise per Week:     Minutes of Exercise per Session:    Stress:     Feeling of Stress :    Social Connections:     Frequency of Communication with Friends and Family:     Frequency of Social Gatherings with Friends and Family:     Attends Bahai Services:     Active Member of Clubs or Organizations:     Attends Club or Organization Meetings:     Marital Status:    Intimate Partner Violence:     Fear of Current or Ex-Partner:     Emotionally Abused:     Physically Abused:     Sexually Abused:      Past Medical History:   Diagnosis Date    Abnormal Pap smear of cervix     Anemia complicating pregnancy in second trimester 3/11/2015    Gastroesophageal reflux disease 2019     Past Surgical History:   Procedure Laterality Date     SECTION  07/20/15    GASTRIC BAND  2019    GASTRIC SLEEVE    UPPER GASTROINTESTINAL ENDOSCOPY N/A 2019    EGD BIOPSY performed by Patrick Renae MD at [facility]     Family History   Problem Relation Age of Onset    Cancer Paternal Grandfather         unknown- Lung     Thyroid Disease Maternal Grandmother     Diabetes Maternal Grandfather     Cancer Maternal Grandfather         throat    Hypertension Maternal Grandfather     Depression Mother     Cancer Maternal Aunt         stomach    Cancer Maternal Uncle         prostate    Stroke Maternal Uncle     Diabetes Maternal Uncle         Review of Systems   Constitutional: Negative for fever, chills, sweats. Eyes: Negative for changes in vision, or pain. HENT: Negative for ear ache, epistaxis, or sore throat. Respiratory/Cardio: Negative for Chest pain, palpitations, SOB, or cough. Gastrointestinal: Negative for abdominal pain, N/V/D. Genitourinary: Negative for dysuria, frequency, urgency, or hematuria. Neurological: Negative for headache, numbness, or weakness. Integumentary: Negative for rash, itching, laceration, or abrasion. Musculoskeletal: Positive for Wrist Pain (right)       Physical Exam:  Constitutional: Patient is oriented to person, place, and time. Patient appears well-developed and well nourished. HENT: Negative otherwise noted  Head: Normocephalic and Atraumatic  Nose: Normal  Eyes: Conjunctivae and EOM are normal  Neck: Normal range of motion Neck supple. Respiratory/Cardio: Effort normal. No respiratory distress. Musculoskeletal:    right Hand/Wrist    Tenderness:  None   Inspection:  3 x 3 cm mass to the volar radial wrist just lateral to the palmaris longus tendon. This is firm to palpation but does appear mobile. This is not fluctuant and does not transilluminate. This is a nonpulsatile mass. Range of Motion:      Pronation: 90     Supination: 90     Flexion: 80     Extension: 50     Hand Joints: normal       Muscle Strength      : 5/5     Wrist Extension: 5/5     Wrist Flexion: 5/5       Sensation: normal   Phalen's Sign: Negative   Tinel's Sign (Medial Nerve): Negative   Finkelstein's Test: Negative     Neurological: Patient is alert and oriented to person, place, and time. Normal strenght. No sensory deficit.   Skin: Skin is warm and dry  Psychiatric: Behavior is normal. Thought content normal.  Nursing note and vitals reviewed. Labs and Imaging:     XR taken today:  No results found. X-rays taken in clinic today and preliminarily reviewed by me:  3 views of the right wrist demonstrate normal osseous alignment without any evidence of acute fracture or significant degenerative changes. Focal soft tissue mass to the volar wrist measuring approximately 2-1/2 x 3 cm. Orders Placed This Encounter   Procedures    XR WRIST RIGHT (MIN 3 VIEWS)     Standing Status:   Future     Number of Occurrences:   1     Standing Expiration Date:   8/10/2022       Assessment and Plan:  1. Right wrist pain    2. Mass of wrist, right          PLAN:  This is a 31 y.o. female who presents to the clinic today for evaluation of mass of the volar right wrist.  Differential includes Ganglion, epidermal cyst, calcifying fibroma, tophus, giant cell tumor, vascular malformation. 1.  Discussed treatment options available to her including monitoring, reassurance and observation versus referral to hand surgeon for possible excision and removal.  2.  After lengthy discussion patient is concerned with the cosmetic appearance and would like discussion of possible excision. 3.  Referral to hand wrist specialist is provided at this time    We will see patient back on an as-needed basis          Please note that this chart was generated using voice recognition Dragon dictation software. Although every effort was made to ensure the accuracy of this automated transcription, some errors in transcription may have occurred.

## 2021-08-23 ENCOUNTER — OFFICE VISIT (OUTPATIENT)
Dept: BARIATRICS/WEIGHT MGMT | Age: 31
End: 2021-08-23
Payer: MEDICARE

## 2021-08-23 VITALS
HEIGHT: 66 IN | RESPIRATION RATE: 20 BRPM | WEIGHT: 293 LBS | SYSTOLIC BLOOD PRESSURE: 108 MMHG | DIASTOLIC BLOOD PRESSURE: 82 MMHG | BODY MASS INDEX: 47.09 KG/M2 | HEART RATE: 84 BPM

## 2021-08-23 DIAGNOSIS — K21.9 GASTROESOPHAGEAL REFLUX DISEASE, UNSPECIFIED WHETHER ESOPHAGITIS PRESENT: Primary | ICD-10-CM

## 2021-08-23 DIAGNOSIS — E66.01 MORBID OBESITY (HCC): ICD-10-CM

## 2021-08-23 DIAGNOSIS — Z90.3 HISTORY OF SLEEVE GASTRECTOMY: ICD-10-CM

## 2021-08-23 PROCEDURE — 99213 OFFICE O/P EST LOW 20 MIN: CPT | Performed by: NURSE PRACTITIONER

## 2021-08-23 PROCEDURE — 1036F TOBACCO NON-USER: CPT | Performed by: NURSE PRACTITIONER

## 2021-08-23 PROCEDURE — G8427 DOCREV CUR MEDS BY ELIG CLIN: HCPCS | Performed by: NURSE PRACTITIONER

## 2021-08-23 PROCEDURE — G8417 CALC BMI ABV UP PARAM F/U: HCPCS | Performed by: NURSE PRACTITIONER

## 2021-08-23 NOTE — PROGRESS NOTES
Medical Nutrition Therapy   Metabolic and Bariatric Surgery         Supervised diet and exercise preparation  Visit 2 out of 3  Pt reports:  No concerns     Changes in eating patterns to promote health are noted below on the goals number 22-25    Vitals: Wt Readings from Last 3 Encounters:   08/23/21 (!) 307 lb (139.3 kg)   08/11/21 (!) 306 lb (138.8 kg)   07/28/21 (!) 306 lb 6.4 oz (139 kg)         Nutrition Assessment:   PES: Knowledge deficit related to healthy behaviors that support weight management post weight loss surgery as evidenced by Body mass index is 50.31 kg/m². Nutrition Assessment of Goal Attainment:  TREATMENT GOALS:    1. Pt  Completed 2 out of 4 goals. 2.TREATMENT GOALS FOR UPCOMING WEEK: continue all previous goals and add: # 12, 13, 14    All goals were planned with and agreed on by the patient. I want to improve my health because I want to live longer! appt # NA G What is your next step? C 1 2 3 4 5 6 7 8 9     2  one I will read the education binder provided to me and the    10 50              2 I will make my pschological evaluation appoinment. x              2  3 I will bring this goal card to every appointment. 100 100             x 4 I will eliminate all tobacco/nicotine. x 5 I will limit alcoholic beverages to 0-8LY per week. x 6 I will limit dining out to 3 times per week or less. x 7 I will eliminate sugary beverages. x 8 I will eliminate carbonated beverages. x 9 I will eliminate drinking with a straw. x 10 I will limit caffeinated beverages to 16oz daily. x 11 I will limit cold cereals prepared with milk. x 12 I will do a 5 minute reflection. x 13 I will food journal daily. 2  14 I will log my exercise daily. 0 100            2  15 I will determine my  calcium and multivitamin plan.  (needs Calcium)                x 16 I will purchase multivitamin. x 17 I will start taking multivitamins following my plan. 2  18 I will have 1-2 servings of protein present at each meal.               2  19 I will eat every 3-5 hours. 0  20 I will drink 64oz of fluid daily. 3-4 bottles water/day x 100               21 I will follow the 15-30-15 guideline. 22 I will eat protein first at all meals followed by vegetables  Fruit and lastly whole grains. 2  23 My first one diet neutral approach is:  I will follow bariatric behaviors. 48              24 my second diet neutral approach is:                 25 My third diet neutral approach is:                                                                                                                             Do you understand your goals? y    Do you have the information you need to achieve your goals? y    Do you have any questions  right now? n        [x]  Consistent goal achievement in the program thus far and further success with goals is expected. []  Unable to consistently make progress in goal achievement. At this time patient is not moving forward  in developing the skills needed for success after surgery. Plan:    Continue to follow monthly and review goals.          [x]  Nutrition visits complete    []

## 2021-08-24 NOTE — PROGRESS NOTES
Medical Weight Management Progress Note    Subjective     Patient being seen for medically supervised weight loss for the chronic conditions of GERD. She had sleeve gastrectomy at another facility in 2019. Highest weight was 360 lbs and got down to 250 lbs. She developed GERD and has had weight regain. She is interested in revision surgery. She is working on the behavior changes discussed at the initial appointment. Patient continues on diet plan. Physical activity includes walking. Weight gain of 4 lbs since last visit. Psych eval scheduled 21. Needs to schedule EGD. Needs to have labs drawn. No current issues. Working toward bariatric surgery:    [] Sleeve Gastrectomy                                                           [] Jesus-en-Y Gastric Bypass               [x] Revision of Sleeve Gastrectomy to Jesus-en-Y Gastric Bypass    Allergies: Allergies   Allergen Reactions    Latex Dermatitis       Past Medical History:     Past Medical History:   Diagnosis Date    Abnormal Pap smear of cervix 5679    Anemia complicating pregnancy in second trimester 3/11/2015    Gastroesophageal reflux disease 2019   .     Past Surgical History:  Past Surgical History:   Procedure Laterality Date     SECTION  07/20/15    GASTRIC BAND  2019    GASTRIC SLEEVE    UPPER GASTROINTESTINAL ENDOSCOPY N/A 2019    EGD BIOPSY performed by Tasneem Han MD at [facility]       Family History:  Family History   Problem Relation Age of Onset    Cancer Paternal Grandfather         unknown- Lung     Thyroid Disease Maternal Grandmother     Diabetes Maternal Grandfather     Cancer Maternal Grandfather         throat    Hypertension Maternal Grandfather     Depression Mother     Cancer Maternal Aunt         stomach    Cancer Maternal Uncle         prostate    Stroke Maternal Uncle     Diabetes Maternal Uncle        Social History:  Social History     Socioeconomic History    Marital status: Single     Spouse name: Not on file    Number of children: Not on file    Years of education: Not on file    Highest education level: Not on file   Occupational History    Not on file   Tobacco Use    Smoking status: Never Smoker    Smokeless tobacco: Never Used   Vaping Use    Vaping Use: Never used   Substance and Sexual Activity    Alcohol use: No    Drug use: No    Sexual activity: Yes     Partners: Male     Birth control/protection: I.U.D. Other Topics Concern    Not on file   Social History Narrative    Not on file     Social Determinants of Health     Financial Resource Strain: High Risk    Difficulty of Paying Living Expenses: Very hard   Food Insecurity: No Food Insecurity    Worried About Running Out of Food in the Last Year: Never true    Amber of Food in the Last Year: Never true   Transportation Needs:     Lack of Transportation (Medical):  Lack of Transportation (Non-Medical):    Physical Activity:     Days of Exercise per Week:     Minutes of Exercise per Session:    Stress:     Feeling of Stress :    Social Connections:     Frequency of Communication with Friends and Family:     Frequency of Social Gatherings with Friends and Family:     Attends [employer] Services:     Active Member of Clubs or Organizations:     Attends Club or Organization Meetings:     Marital Status:    Intimate Partner Violence:     Fear of Current or Ex-Partner:     Emotionally Abused:     Physically Abused:     Sexually Abused:        Current Medications:  Current Outpatient Medications   Medication Sig Dispense Refill    pantoprazole (PROTONIX) 40 MG tablet Take 1 tablet by mouth daily 30 tablet 3    famotidine (PEPCID) 20 MG tablet Take 1 tablet by mouth nightly 30 tablet 3     No current facility-administered medications for this visit.        Vital Signs:  /82 (Site: Right Upper Arm, Position: Sitting, Cuff Size: Large Adult)   Pulse 84   Resp 20   Ht 5' 5.5\" (1.664 m)   Wt (!) 307 lb (139.3 kg)   BMI 50.31 kg/m²     BMI/Height/Weight:  Body mass index is 50.31 kg/m². Review of Systems - A review of systems was performed. All was negative unless otherwise documented in HPI. Constitutional: Negative for fever, chills and diaphoresis. HENT: Negative for hearing loss and trouble swallowing. Eyes: Negative for photophobia and visual disturbance. Respiratory: Negative for cough, shortness of breath and wheezing. Cardiovascular: Negative for chest pain and palpitations. Gastrointestinal: Negative for nausea, vomiting, abdominal pain, diarrhea, constipation, blood in stool and abdominal distention. Endocrine: Negative for polydipsia, polyphagia and polyuria. Genitourinary: Negative for dysuria, frequency, hematuria and difficulty urinating. Musculoskeletal: Negative for myalgias, joint swelling. Skin: Negative for pallor and rash. Neurological: Negative for dizziness, tremors, light-headedness and headaches. Psychiatric/Behavioral: Negative for sleep disturbance and dysphoric mood. Objective:      Physical Exam   Vital signs reviewed. General: Well-developed and well-nourished. No acute distress. Skin: Warm, dry and intact. HEENT: Normocephalic. EOMs intact. Conjunctivae normal. Neck supple. Cardiovascular: Normal rate, regular rhythm. Pulmonary/Chest: Normal effort. Lungs clear to auscultation. No rales, rhonchi or wheezing. Abdominal: Positive bowel sounds. Soft, nontender. Nondistended. Musculoskeletal: Movement x4. No edema. Neurological: Gait normal. Alert and oriented to person, place, and time. Psychiatric: Normal mood and affect. Speech and behavior normal. Judgment and thought content normal. Cognition and memory intact. Assessment:       Diagnosis Orders   1. Gastroesophageal reflux disease, unspecified whether esophagitis present     2. Morbid obesity ([facility] [address]     3.  History of sleeve gastrectomy         Plan: Dietitian visit today. Patient was encouraged to journal all food intake. Keep calorie level at approximately 3852-5391. Protein intake is to be a minimum of 60-80 grams per day. Water drinking was encouraged with a goal of 64oz-128oz daily. Beverages to be calorie free except for milk. Every other beverage should be water. Avoid soda. Continue to increase level of physical activity. Encouraged use of exercise log. Follow-up  Return in about 1 month (around 9/23/2021). Orders this encounter:  No orders of the defined types were placed in this encounter. Prescriptions this encounter:  No orders of the defined types were placed in this encounter.       Electronically signed by:  William Davis CNP

## 2021-09-27 ENCOUNTER — OFFICE VISIT (OUTPATIENT)
Dept: BARIATRICS/WEIGHT MGMT | Age: 31
End: 2021-09-27
Payer: MEDICARE

## 2021-09-27 VITALS
DIASTOLIC BLOOD PRESSURE: 84 MMHG | HEIGHT: 66 IN | HEART RATE: 80 BPM | BODY MASS INDEX: 47.09 KG/M2 | RESPIRATION RATE: 20 BRPM | WEIGHT: 293 LBS | SYSTOLIC BLOOD PRESSURE: 120 MMHG

## 2021-09-27 DIAGNOSIS — K21.9 GASTROESOPHAGEAL REFLUX DISEASE, UNSPECIFIED WHETHER ESOPHAGITIS PRESENT: Primary | ICD-10-CM

## 2021-09-27 DIAGNOSIS — E66.01 MORBID OBESITY (HCC): ICD-10-CM

## 2021-09-27 DIAGNOSIS — Z90.3 HISTORY OF SLEEVE GASTRECTOMY: ICD-10-CM

## 2021-09-27 PROCEDURE — 99213 OFFICE O/P EST LOW 20 MIN: CPT | Performed by: NURSE PRACTITIONER

## 2021-09-27 PROCEDURE — G8417 CALC BMI ABV UP PARAM F/U: HCPCS | Performed by: NURSE PRACTITIONER

## 2021-09-27 PROCEDURE — 1036F TOBACCO NON-USER: CPT | Performed by: NURSE PRACTITIONER

## 2021-09-27 PROCEDURE — G8427 DOCREV CUR MEDS BY ELIG CLIN: HCPCS | Performed by: NURSE PRACTITIONER

## 2021-09-27 NOTE — PROGRESS NOTES
Medical Weight Management Progress Note    Subjective     Patient being seen for medically supervised weight loss for the chronic conditions of GERD. She had sleeve gastrectomy at another facility in 2019. Highest weight was 360 lbs and got down to 250 lbs. She developed GERD and has had weight regain. She is interested in revision surgery. She is working on the behavior changes discussed at the initial appointment. Patient continues on diet plan. Physical activity includes walking and stairs. Weight gain of 5 lbs since last visit. Psych eval completed and clearance received. EGD scheduled 10/8/21. Needs to have labs drawn. No current issues. Working toward bariatric surgery:    [] Sleeve Gastrectomy                                                           [] Jesus-en-Y Gastric Bypass               [x] Revision of Sleeve Gastrectomy to Jesus-en-Y Gastric Bypass    Allergies: Allergies   Allergen Reactions    Latex Dermatitis       Past Medical History:     Past Medical History:   Diagnosis Date    Abnormal Pap smear of cervix     Anemia complicating pregnancy in second trimester 3/11/2015    Gastroesophageal reflux disease 2019   .     Past Surgical History:  Past Surgical History:   Procedure Laterality Date     SECTION  07/20/15    GASTRIC BAND  2019    GASTRIC SLEEVE    UPPER GASTROINTESTINAL ENDOSCOPY N/A 2019    EGD BIOPSY performed by Santos Cardozo MD at [facility]       Family History:  Family History   Problem Relation Age of Onset    Cancer Paternal Grandfather         unknown- Lung     Thyroid Disease Maternal Grandmother     Diabetes Maternal Grandfather     Cancer Maternal Grandfather         throat    Hypertension Maternal Grandfather     Depression Mother     Cancer Maternal Aunt         stomach    Cancer Maternal Uncle         prostate    Stroke Maternal Uncle     Diabetes Maternal Uncle        Social History:  Social History Socioeconomic History    Marital status: Single     Spouse name: Not on file    Number of children: Not on file    Years of education: Not on file    Highest education level: Not on file   Occupational History    Not on file   Tobacco Use    Smoking status: Never Smoker    Smokeless tobacco: Never Used   Vaping Use    Vaping Use: Never used   Substance and Sexual Activity    Alcohol use: No    Drug use: No    Sexual activity: Yes     Partners: Male     Birth control/protection: I.U.D. Other Topics Concern    Not on file   Social History Narrative    Not on file     Social Determinants of Health     Financial Resource Strain: High Risk    Difficulty of Paying Living Expenses: Very hard   Food Insecurity: No Food Insecurity    Worried About Running Out of Food in the Last Year: Never true    Amber of Food in the Last Year: Never true   Transportation Needs:     Lack of Transportation (Medical):  Lack of Transportation (Non-Medical):    Physical Activity:     Days of Exercise per Week:     Minutes of Exercise per Session:    Stress:     Feeling of Stress :    Social Connections:     Frequency of Communication with Friends and Family:     Frequency of Social Gatherings with Friends and Family:     Attends Orthodoxy Services:     Active Member of Clubs or Organizations:     Attends Club or Organization Meetings:     Marital Status:    Intimate Partner Violence:     Fear of Current or Ex-Partner:     Emotionally Abused:     Physically Abused:     Sexually Abused:        Current Medications:  Current Outpatient Medications   Medication Sig Dispense Refill    pantoprazole (PROTONIX) 40 MG tablet Take 1 tablet by mouth daily 30 tablet 3    famotidine (PEPCID) 20 MG tablet Take 1 tablet by mouth nightly 30 tablet 3     No current facility-administered medications for this visit.        Vital Signs:  /84 (Site: Right Upper Arm, Position: Sitting, Cuff Size: Large Adult)   Pulse gastrectomy         Plan:    Dietitian visit today. Patient was encouraged to journal all food intake. Keep calorie level at approximately 4558-3448. Protein intake is to be a minimum of 60-80 grams per day. Water drinking was encouraged with a goal of 64oz-128oz daily. Beverages to be calorie free except for milk. Every other beverage should be water. Avoid soda. Continue to increase level of physical activity. Encouraged use of exercise log. Follow-up  No follow-ups on file. Orders this encounter:  No orders of the defined types were placed in this encounter. Prescriptions this encounter:  No orders of the defined types were placed in this encounter.       Electronically signed by:  Neri Becerra CNP

## 2021-09-27 NOTE — PROGRESS NOTES
multivitamin. x 17 I will start taking multivitamins following my plan. 2  18 I will have 1-2 servings of protein present at each meal.    100           2  19 I will eat every 3-5 hours. 100           0  20 I will drink 64oz of fluid daily. 3-4 bottles water/day x 100               21 I will follow the 15-30-15 guideline. x                22 I will eat protein first at all meals followed by vegetables  Fruit and lastly whole grains. x              2  23 My first one diet neutral approach is:  I will follow bariatric behaviors. 48 100             24 my second diet neutral approach is:                 25 My third diet neutral approach is:                                                                          Questions asked for goal understanding:                                                  Do you understand your goals? Do you have the information you need to achieve your goals? Do you have any questions  right now? [x]  Consistent goal achievement in the program thus far and further success with goals is expected. []  Unable to consistently make progress in goal achievement. At this time patient is not moving forward  in developing the skills needed for success after surgery. Plan:    Continue to follow monthly and review goals.          []  Nutrition visits complete    [x]

## 2021-10-04 ENCOUNTER — HOSPITAL ENCOUNTER (OUTPATIENT)
Dept: LAB | Age: 31
Setting detail: SPECIMEN
Discharge: HOME | End: 2021-10-04
Payer: MEDICARE

## 2021-10-04 DIAGNOSIS — Z20.822 COVID-19 RULED OUT BY LABORATORY TESTING: Primary | ICD-10-CM

## 2021-10-04 PROCEDURE — U0003 INFECTIOUS AGENT DETECTION BY NUCLEIC ACID (DNA OR RNA); SEVERE ACUTE RESPIRATORY SYNDROME CORONAVIRUS 2 (SARS-COV-2) (CORONAVIRUS DISEASE [COVID-19]), AMPLIFIED PROBE TECHNIQUE, MAKING USE OF HIGH THROUGHPUT TECHNOLOGIES AS DESCRIBED BY CMS-2020-01-R: HCPCS

## 2021-10-04 PROCEDURE — U0005 INFEC AGEN DETEC AMPLI PROBE: HCPCS

## 2021-10-05 LAB
SARS-COV-2: NORMAL
SARS-COV-2: NOT DETECTED
SOURCE: NORMAL

## 2021-10-06 ENCOUNTER — ANESTHESIA EVENT (OUTPATIENT)
Dept: OPERATING ROOM | Age: 31
End: 2021-10-06
Payer: MEDICARE

## 2021-10-08 ENCOUNTER — HOSPITAL ENCOUNTER (OUTPATIENT)
Age: 31
Setting detail: OUTPATIENT SURGERY
Discharge: HOME | End: 2021-10-08
Attending: SURGERY | Admitting: SURGERY
Payer: MEDICARE

## 2021-10-08 ENCOUNTER — ANESTHESIA (OUTPATIENT)
Dept: OPERATING ROOM | Age: 31
End: 2021-10-08
Payer: MEDICARE

## 2021-10-08 VITALS — OXYGEN SATURATION: 100 % | SYSTOLIC BLOOD PRESSURE: 179 MMHG | DIASTOLIC BLOOD PRESSURE: 97 MMHG

## 2021-10-08 VITALS
SYSTOLIC BLOOD PRESSURE: 116 MMHG | DIASTOLIC BLOOD PRESSURE: 72 MMHG | HEART RATE: 87 BPM | RESPIRATION RATE: 18 BRPM | HEIGHT: 66 IN | BODY MASS INDEX: 47.09 KG/M2 | TEMPERATURE: 98.7 F | WEIGHT: 293 LBS | OXYGEN SATURATION: 97 %

## 2021-10-08 LAB — HCG, PREGNANCY URINE (POC): NEGATIVE

## 2021-10-08 PROCEDURE — 3700000000 HC ANESTHESIA ATTENDED CARE: Performed by: SURGERY

## 2021-10-08 PROCEDURE — 81025 URINE PREGNANCY TEST: CPT

## 2021-10-08 PROCEDURE — 2580000003 HC RX 258: Performed by: NURSE ANESTHETIST, CERTIFIED REGISTERED

## 2021-10-08 PROCEDURE — 7100000010 HC PHASE II RECOVERY - FIRST 15 MIN: Performed by: SURGERY

## 2021-10-08 PROCEDURE — 88342 IMHCHEM/IMCYTCHM 1ST ANTB: CPT

## 2021-10-08 PROCEDURE — 2709999900 HC NON-CHARGEABLE SUPPLY: Performed by: SURGERY

## 2021-10-08 PROCEDURE — 7100000011 HC PHASE II RECOVERY - ADDTL 15 MIN: Performed by: SURGERY

## 2021-10-08 PROCEDURE — 88305 TISSUE EXAM BY PATHOLOGIST: CPT

## 2021-10-08 PROCEDURE — 43239 EGD BIOPSY SINGLE/MULTIPLE: CPT | Performed by: SURGERY

## 2021-10-08 PROCEDURE — 2500000003 HC RX 250 WO HCPCS: Performed by: NURSE ANESTHETIST, CERTIFIED REGISTERED

## 2021-10-08 PROCEDURE — 6360000002 HC RX W HCPCS: Performed by: NURSE ANESTHETIST, CERTIFIED REGISTERED

## 2021-10-08 PROCEDURE — 3609012400 HC EGD TRANSORAL BIOPSY SINGLE/MULTIPLE: Performed by: SURGERY

## 2021-10-08 RX ORDER — MIDAZOLAM HYDROCHLORIDE 1 MG/ML
INJECTION INTRAMUSCULAR; INTRAVENOUS PRN
Status: DISCONTINUED | OUTPATIENT
Start: 2021-10-08 | End: 2021-10-08 | Stop reason: SDUPTHER

## 2021-10-08 RX ORDER — SODIUM CHLORIDE 9 MG/ML
25 INJECTION, SOLUTION INTRAVENOUS PRN
Status: DISCONTINUED | OUTPATIENT
Start: 2021-10-08 | End: 2021-10-08 | Stop reason: HOSPADM

## 2021-10-08 RX ORDER — HYDROCODONE BITARTRATE AND ACETAMINOPHEN 5; 325 MG/1; MG/1
2 TABLET ORAL PRN
Status: DISCONTINUED | OUTPATIENT
Start: 2021-10-08 | End: 2021-10-08 | Stop reason: HOSPADM

## 2021-10-08 RX ORDER — DIPHENHYDRAMINE HYDROCHLORIDE 50 MG/ML
12.5 INJECTION INTRAMUSCULAR; INTRAVENOUS
Status: DISCONTINUED | OUTPATIENT
Start: 2021-10-08 | End: 2021-10-08 | Stop reason: HOSPADM

## 2021-10-08 RX ORDER — SODIUM CHLORIDE, SODIUM LACTATE, POTASSIUM CHLORIDE, CALCIUM CHLORIDE 600; 310; 30; 20 MG/100ML; MG/100ML; MG/100ML; MG/100ML
INJECTION, SOLUTION INTRAVENOUS CONTINUOUS
Status: DISCONTINUED | OUTPATIENT
Start: 2021-10-08 | End: 2021-10-08 | Stop reason: HOSPADM

## 2021-10-08 RX ORDER — SODIUM CHLORIDE 9 MG/ML
INJECTION, SOLUTION INTRAVENOUS CONTINUOUS
Status: DISCONTINUED | OUTPATIENT
Start: 2021-10-08 | End: 2021-10-08 | Stop reason: HOSPADM

## 2021-10-08 RX ORDER — FENTANYL CITRATE 50 UG/ML
25 INJECTION, SOLUTION INTRAMUSCULAR; INTRAVENOUS EVERY 5 MIN PRN
Status: DISCONTINUED | OUTPATIENT
Start: 2021-10-08 | End: 2021-10-08 | Stop reason: HOSPADM

## 2021-10-08 RX ORDER — SODIUM CHLORIDE 0.9 % (FLUSH) 0.9 %
10 SYRINGE (ML) INJECTION EVERY 12 HOURS SCHEDULED
Status: DISCONTINUED | OUTPATIENT
Start: 2021-10-08 | End: 2021-10-08 | Stop reason: HOSPADM

## 2021-10-08 RX ORDER — MORPHINE SULFATE 1 MG/ML
1 INJECTION, SOLUTION EPIDURAL; INTRATHECAL; INTRAVENOUS EVERY 5 MIN PRN
Status: DISCONTINUED | OUTPATIENT
Start: 2021-10-08 | End: 2021-10-08 | Stop reason: HOSPADM

## 2021-10-08 RX ORDER — PROMETHAZINE HYDROCHLORIDE 25 MG/ML
6.25 INJECTION, SOLUTION INTRAMUSCULAR; INTRAVENOUS
Status: DISCONTINUED | OUTPATIENT
Start: 2021-10-08 | End: 2021-10-08 | Stop reason: HOSPADM

## 2021-10-08 RX ORDER — ONDANSETRON 2 MG/ML
4 INJECTION INTRAMUSCULAR; INTRAVENOUS
Status: DISCONTINUED | OUTPATIENT
Start: 2021-10-08 | End: 2021-10-08 | Stop reason: HOSPADM

## 2021-10-08 RX ORDER — MEPERIDINE HYDROCHLORIDE 50 MG/ML
12.5 INJECTION INTRAMUSCULAR; INTRAVENOUS; SUBCUTANEOUS EVERY 5 MIN PRN
Status: DISCONTINUED | OUTPATIENT
Start: 2021-10-08 | End: 2021-10-08 | Stop reason: HOSPADM

## 2021-10-08 RX ORDER — LIDOCAINE HYDROCHLORIDE 20 MG/ML
INJECTION, SOLUTION INFILTRATION; PERINEURAL PRN
Status: DISCONTINUED | OUTPATIENT
Start: 2021-10-08 | End: 2021-10-08 | Stop reason: SDUPTHER

## 2021-10-08 RX ORDER — HYDRALAZINE HYDROCHLORIDE 20 MG/ML
5 INJECTION INTRAMUSCULAR; INTRAVENOUS EVERY 10 MIN PRN
Status: DISCONTINUED | OUTPATIENT
Start: 2021-10-08 | End: 2021-10-08 | Stop reason: HOSPADM

## 2021-10-08 RX ORDER — GLYCOPYRROLATE 1 MG/5 ML
SYRINGE (ML) INTRAVENOUS PRN
Status: DISCONTINUED | OUTPATIENT
Start: 2021-10-08 | End: 2021-10-08 | Stop reason: SDUPTHER

## 2021-10-08 RX ORDER — SODIUM CHLORIDE, SODIUM LACTATE, POTASSIUM CHLORIDE, CALCIUM CHLORIDE 600; 310; 30; 20 MG/100ML; MG/100ML; MG/100ML; MG/100ML
INJECTION, SOLUTION INTRAVENOUS CONTINUOUS PRN
Status: DISCONTINUED | OUTPATIENT
Start: 2021-10-08 | End: 2021-10-08 | Stop reason: SDUPTHER

## 2021-10-08 RX ORDER — PROPOFOL 10 MG/ML
INJECTION, EMULSION INTRAVENOUS PRN
Status: DISCONTINUED | OUTPATIENT
Start: 2021-10-08 | End: 2021-10-08 | Stop reason: SDUPTHER

## 2021-10-08 RX ORDER — KETAMINE HYDROCHLORIDE 100 MG/ML
INJECTION, SOLUTION INTRAMUSCULAR; INTRAVENOUS PRN
Status: DISCONTINUED | OUTPATIENT
Start: 2021-10-08 | End: 2021-10-08 | Stop reason: SDUPTHER

## 2021-10-08 RX ORDER — SODIUM CHLORIDE 0.9 % (FLUSH) 0.9 %
10 SYRINGE (ML) INJECTION PRN
Status: DISCONTINUED | OUTPATIENT
Start: 2021-10-08 | End: 2021-10-08 | Stop reason: HOSPADM

## 2021-10-08 RX ORDER — HYDROCODONE BITARTRATE AND ACETAMINOPHEN 5; 325 MG/1; MG/1
1 TABLET ORAL PRN
Status: DISCONTINUED | OUTPATIENT
Start: 2021-10-08 | End: 2021-10-08 | Stop reason: HOSPADM

## 2021-10-08 RX ADMIN — LIDOCAINE HYDROCHLORIDE 100 MG: 20 INJECTION, SOLUTION INFILTRATION; PERINEURAL at 09:04

## 2021-10-08 RX ADMIN — PROPOFOL INJECTABLE EMULSION 50 MG: 10 INJECTION, EMULSION INTRAVENOUS at 09:06

## 2021-10-08 RX ADMIN — PROPOFOL INJECTABLE EMULSION 50 MG: 10 INJECTION, EMULSION INTRAVENOUS at 09:04

## 2021-10-08 RX ADMIN — SODIUM CHLORIDE, POTASSIUM CHLORIDE, SODIUM LACTATE AND CALCIUM CHLORIDE: 600; 310; 30; 20 INJECTION, SOLUTION INTRAVENOUS at 09:03

## 2021-10-08 RX ADMIN — KETAMINE HYDROCHLORIDE 50 MG: 100 INJECTION, SOLUTION, CONCENTRATE INTRAMUSCULAR; INTRAVENOUS at 09:04

## 2021-10-08 RX ADMIN — MIDAZOLAM HYDROCHLORIDE 2 MG: 1 INJECTION, SOLUTION INTRAMUSCULAR; INTRAVENOUS at 09:03

## 2021-10-08 RX ADMIN — PROPOFOL INJECTABLE EMULSION 20 MG: 10 INJECTION, EMULSION INTRAVENOUS at 09:09

## 2021-10-08 RX ADMIN — Medication 0.2 MG: at 09:04

## 2021-10-08 ASSESSMENT — PULMONARY FUNCTION TESTS
PIF_VALUE: 1

## 2021-10-08 ASSESSMENT — PAIN SCALES - GENERAL: PAINLEVEL_OUTOF10: 0

## 2021-10-08 NOTE — ANESTHESIA PRE PROCEDURE
Department of Anesthesiology  Preprocedure Note       Name:  Denis Diaz   Age:  31 y.o.  :  1990                                          MRN:  [id number]         Date:  10/8/2021      Surgeon: Denice Liz):  Rosina Boone DO    Procedure: Procedure(s):  EGD BIOPSY    Medications prior to admission:   Prior to Admission medications    Medication Sig Start Date End Date Taking? Authorizing Provider   pantoprazole (PROTONIX) 40 MG tablet Take 1 tablet by mouth daily 21   PRADIP Avendano CNP   famotidine (PEPCID) 20 MG tablet Take 1 tablet by mouth nightly 21   PRADIP Avendano CNP       Current medications:    No current facility-administered medications for this encounter. Allergies: Allergies   Allergen Reactions    Latex Dermatitis       Problem List:    Patient Active Problem List   Diagnosis Code    H/O abnormal cervical Papanicolaou smear Z87.42    Anal warts A63.0    Rh+/RI/GBS- O09.90    H/O LGSIL (1/29/15) V92.009    S/P bariatric surgery Z98.84    Gastroesophageal reflux disease K21.9    Morbid obesity ([facility] [address] E66.01    Status post gastric surgery Z98.890    History of sleeve gastrectomy Z90.3       Past Medical History:        Diagnosis Date    Abnormal Pap smear of cervix     Anemia complicating pregnancy in second trimester 3/11/2015    Gastroesophageal reflux disease 2019    Morbidly obese ([facility] [address]        Past Surgical History:        Procedure Laterality Date     SECTION  07/20/15    GASTRIC BAND  2019    GASTRIC SLEEVE    UPPER GASTROINTESTINAL ENDOSCOPY N/A 2019    EGD BIOPSY performed by Zackery Harp MD at [address] [place] [address] History:    Social History     Tobacco Use    Smoking status: Never Smoker    Smokeless tobacco: Never Used   Substance Use Topics    Alcohol use: No                                Counseling given: Not Answered      Vital Signs (Current):  There were no vitals filed for this visit. BP Readings from Last 3 Encounters:   09/27/21 120/84   08/23/21 108/82   07/28/21 122/80       NPO Status:                                                                                 BMI:   Wt Readings from Last 3 Encounters:   09/27/21 (!) 312 lb (141.5 kg)   08/23/21 (!) 307 lb (139.3 kg)   08/11/21 (!) 306 lb (138.8 kg)     There is no height or weight on file to calculate BMI.    CBC:   Lab Results   Component Value Date    WBC 8.0 10/13/2019    RBC 3.88 10/13/2019    HGB 10.1 10/13/2019    HCT 31.8 10/13/2019    MCV 81.9 10/13/2019    RDW 22.1 10/13/2019     10/13/2019       CMP:   Lab Results   Component Value Date     10/13/2019    K 3.5 10/13/2019     10/13/2019    CO2 20 10/13/2019    BUN 7 10/13/2019    CREATININE 0.63 10/13/2019    GFRAA >60 10/13/2019    LABGLOM >60 10/13/2019    GLUCOSE 70 10/13/2019    PROT 7.1 10/13/2019    CALCIUM 8.1 10/13/2019    BILITOT 0.60 10/13/2019    ALKPHOS 71 10/13/2019    AST 17 10/13/2019    ALT 18 10/13/2019       POC Tests: No results for input(s): POCGLU, POCNA, POCK, POCCL, POCBUN, POCHEMO, POCHCT in the last 72 hours.     Coags: No results found for: PROTIME, INR, APTT    HCG (If Applicable):   Lab Results   Component Value Date    PREGTESTUR negative 12/09/2020    HCG NEGATIVE 09/18/2019    HCGQUANT 22,291 (H) 05/28/2015        ABGs: No results found for: PHART, PO2ART, AZZ7BGC, SRX3VQS, BEART, L8ZBJSXM     Type & Screen (If Applicable):  No results found for: LABABO, LABRH    Drug/Infectious Status (If Applicable):  No results found for: HIV, HEPCAB    COVID-19 Screening (If Applicable):   Lab Results   Component Value Date    COVID19 Not Detected 10/04/2021           Anesthesia Evaluation  Patient summary reviewed and Nursing notes reviewed no history of anesthetic complications:   Airway: Mallampati: III  TM distance: >3 FB   Neck ROM: full  Mouth opening: > = 3 FB Dental: normal exam Pulmonary:Negative Pulmonary ROS and normal exam                               Cardiovascular:Negative CV ROS            Rhythm: regular  Rate: normal                    Neuro/Psych:   Negative Neuro/Psych ROS              GI/Hepatic/Renal:   (+) GERD:, morbid obesity          Endo/Other: Negative Endo/Other ROS                    Abdominal:   (+) obese,     Abdomen: soft. Vascular: negative vascular ROS. Other Findings:           Anesthesia Plan      MAC     ASA 3             Anesthetic plan and risks discussed with patient. Plan discussed with CRNA.                   Elias Washington MD   10/8/2021

## 2021-10-08 NOTE — ANESTHESIA POSTPROCEDURE EVALUATION
POST- ANESTHESIA EVALUATION       Pt Name: Iván Downey  MRN: [id number]  YOB: 1990  Date of evaluation: 10/8/2021  Time:  10:06 AM      /72   Pulse 87   Temp 98.7 °F (37.1 °C) (Temporal)   Resp 18   Ht 5' 6\" (1.676 m)   Wt (!) 312 lb (141.5 kg)   SpO2 97%   BMI 50.36 kg/m²      Consciousness Level  Awake  Cardiopulmonary Status  Stable  Pain Adequately Treated YES  Nausea / Vomiting  NO  Adequate Hydration  YES  Anesthesia Related Complications NONE      Electronically signed by Tres Rush MD on 10/8/2021 at 10:06 AM       Department of Anesthesiology  Postprocedure Note    Patient: Iván Downey  MRN: [id number]  [place]: 1990  Date of evaluation: 10/8/2021  Time:  10:06 AM     Procedure Summary     Date: 10/08/21 Room / Location: [address]    Anesthesia Start: 1986 Anesthesia Stop: 7177    Procedure: EGD BIOPSY (N/A ) Diagnosis: (K21.9 GERD E66.01 OBESITY)    Surgeons: Christi Berger DO Responsible Provider: Tres Rush MD    Anesthesia Type: MAC ASA Status: 3          Anesthesia Type: MAC    Amber Phase I:      Amber Phase II: Amber Score: 9    Last vitals: Reviewed and per EMR flowsheets.        Anesthesia Post Evaluation

## 2021-10-08 NOTE — OP NOTE
[facility] [place]  [address] [address]  Dept: [phone]  Loc: [phone]    10/8/2021    Preoperative Diagnosis:    GERD  Morbid obesity, BMI of Body mass index is 50.36 kg/m². Postoperative Diagnosis:   GERD with ulcerative esophagitis  2-3 cm hiatal hernia  Morbid obesity, BMI of Body mass index is 50.36 kg/m². Procedure: Esophagogastroduodenoscopy with Biopsy    Surgeon: Shreyas Shen DO    Assistant:    Anesthesia: MAC, see anesthesia records    Specimen:    1) Antrum for H. Pylori    Findings: 2-3 cm hiatal hernia, ulcerative esophagitis. Mildly diffusely dilated sleeve    EBL: NONE    Operative Narrative: The risks and benefits were explained in detail to the patient who agreed and consented to the procedure. The patient was taken to the endoscopic suite and placed in a lateral position. Oxygen was administered via nasal cannula and a mouth guard was placed. MAC was administered via the anesthetic team.      The endoscope was then advanced into the oropharynx and down into the esophagus under direct visualization. The scope was further advanced through the esophagus, GE junction and stomach to the pylorus under visualization. The scope was passed through the pylorus and duodenal sweep performed, advancing and visualizing to the second portion of the duodenum. The scope was then withdrawn to the antrum and cold forceps were used to take biopsies of the antrum for H. Pylori. Appropriate hemostasis was noted. The scope was then retroflexed to visualize the GE junction. Evidence of a hiatal hernia was noted. The scope was then slowly withdrawn through the GE junction. The Z line was noted. The stomach was then decompressed. The scope was withdrawn from the esophagus and no further lesions noted. The scope was withdrawn. The patient tolerated the procedure well. PPI.     She will [facility] up with the [facility] for

## 2021-10-08 NOTE — H&P
Subjective     The patient is a 31 y.o. female who is here to undergo EGD for evaluation of prior sleeve gastrectomy. She has persistent GERD despite medications. She has also had weight regain and would like evaluation for revision to another bariatric procedure. Past Medical History:   Diagnosis Date    Abnormal Pap smear of cervix     Anemia complicating pregnancy in second trimester 3/11/2015    Gastroesophageal reflux disease 2019    Morbidly obese (HCC)    . Review of Systems - A complete 14 point review of systems was performed. All was negative unless otherwise documented in HPI. Allergies:   Allergies   Allergen Reactions    Latex Dermatitis       Past Surgical History:  Past Surgical History:   Procedure Laterality Date     SECTION  07/20/15    GASTRIC BAND  2019    GASTRIC SLEEVE    UPPER GASTROINTESTINAL ENDOSCOPY N/A 2019    [facility]GD [facility]SY [facility]an [facility] at [facility]       Family History:  Family History   Problem Relation Age of Onset    Cancer Paternal Grandfather         unknown- Lung     Thyroid Disease Maternal Grandmother     Diabetes Maternal Grandfather     Cancer Maternal Grandfather         throat    Hypertension Maternal Grandfather     Depression Mother     Cancer Maternal Aunt         stomach    Cancer Maternal Uncle         prostate    Stroke Maternal Uncle     Diabetes Maternal Uncle        Social History:  Social History     Socioeconomic History    Marital status: Single     Spouse name: Not on file    Number of children: Not on file    Years of education: Not on file    Highest education level: Not on file   Occupational History    Not on file   Tobacco Use    Smoking status: Never Smoker    Smokeless tobacco: Never Used   Vaping Use    Vaping Use: Never used   Substance and Sexual Activity    Alcohol use: No    Drug use: No    Sexual activity: Yes     Partners: Male     Birth control/protection: I.U.D. Other Topics Concern    Not on file   Social History Narrative    Not on file     Social Determinants of Health     Financial Resource Strain: High Risk    Difficulty of Paying Living Expenses: Very hard   Food Insecurity: No Food Insecurity    Worried About Running Out of Food in the Last Year: Never true    Amber of Food in the Last Year: Never true   Transportation Needs:     Lack of Transportation (Medical):  Lack of Transportation (Non-Medical):    Physical Activity:     Days of Exercise per Week:     Minutes of Exercise per Session:    Stress:     Feeling of Stress :    Social Connections:     Frequency of Communication with Friends and Family:     Frequency of Social Gatherings with Friends and Family:     Attends Jain Services:     Active Member of Clubs or Organizations:     Attends Club or Organization Meetings:     Marital Status:    Intimate Partner Violence:     Fear of Current or Ex-Partner:     Emotionally Abused:     Physically Abused:     Sexually Abused:        No current facility-administered medications for this encounter. Objective      Physical Exam  There were no vitals taken for this visit. Constitutional:  Vital signs are normal. The patient appears well-developed and well-nourished. HEENT:   Head: Normocephalic. Atraumatic  Eyes: pupils are equal and reactive. No scleral icterus is present. Neck: No mass and no thyromegaly present. Cardiovascular: Normal rate, regular rhythm, S1 normal and S2 normal.    Pulmonary/Chest: Effort normal and breath sounds normal.   Abdominal: Soft. Normal appearance. There is no organomegaly. No tenderness. There is no rigidity, no rebound, no guarding and no Gao's sign. Musculoskeletal:        Right lower leg: Normal. No tenderness and no edema. Left lower leg: Normal. No tenderness and no edema. Lymphadenopathy:     No cervical adenopathy, No Exrtemity Adenopathy.    Neurological: The patient is alert and oriented. Skin: Skin is warm, dry and intact. Psychiatric: The patient has a normal mood and affect.  Speech is normal and behavior is normal. Judgment and thought content normal. Cognition and memory are normal.     Assessment     Patient Active Problem List   Diagnosis    H/O abnormal cervical Papanicolaou smear    Anal warts    Rh+/RI/GBS-    H/O LGSIL (1/29/15)    S/P bariatric surgery    Gastroesophageal reflux disease    Morbid obesity ([facility] [address]    Status post gastric surgery    History of sleeve gastrectomy       Plan   EGD

## 2021-10-13 LAB — SURGICAL PATHOLOGY REPORT: NORMAL

## 2021-10-15 PROBLEM — K44.9 HIATAL HERNIA WITH GERD AND ESOPHAGITIS: Status: ACTIVE | Noted: 2019-09-05

## 2021-10-15 PROBLEM — K21.00 HIATAL HERNIA WITH GERD AND ESOPHAGITIS: Status: ACTIVE | Noted: 2019-09-05

## 2021-10-28 ENCOUNTER — HOSPITAL ENCOUNTER (OUTPATIENT)
Age: 31
Setting detail: SPECIMEN
Discharge: HOME | End: 2021-10-28
Payer: MEDICARE

## 2021-10-28 DIAGNOSIS — E53.8 LOW FOLATE: Primary | ICD-10-CM

## 2021-10-28 DIAGNOSIS — Z90.3 HISTORY OF SLEEVE GASTRECTOMY: ICD-10-CM

## 2021-10-28 DIAGNOSIS — K21.9 GASTROESOPHAGEAL REFLUX DISEASE, UNSPECIFIED WHETHER ESOPHAGITIS PRESENT: ICD-10-CM

## 2021-10-28 DIAGNOSIS — E66.01 MORBID OBESITY (HCC): ICD-10-CM

## 2021-10-28 DIAGNOSIS — E55.9 VITAMIN D DEFICIENCY: Primary | ICD-10-CM

## 2021-10-28 LAB
ABSOLUTE EOS #: 0.1 K/UL (ref 0–0.4)
ABSOLUTE IMMATURE GRANULOCYTE: ABNORMAL K/UL (ref 0–0.3)
ABSOLUTE LYMPH #: 2.9 K/UL (ref 1–4.8)
ABSOLUTE MONO #: 0.4 K/UL (ref 0.1–1.3)
ALBUMIN SERPL-MCNC: 4.3 G/DL (ref 3.5–5.2)
ALBUMIN/GLOBULIN RATIO: ABNORMAL (ref 1–2.5)
ALP BLD-CCNC: 97 U/L (ref 35–104)
ALT SERPL-CCNC: 11 U/L (ref 5–33)
AMPHETAMINE SCREEN URINE: NEGATIVE
ANION GAP SERPL CALCULATED.3IONS-SCNC: 9 MMOL/L (ref 9–17)
AST SERPL-CCNC: 14 U/L
BARBITURATE SCREEN URINE: NEGATIVE
BASOPHILS # BLD: 1 % (ref 0–2)
BASOPHILS ABSOLUTE: 0.1 K/UL (ref 0–0.2)
BENZODIAZEPINE SCREEN, URINE: NEGATIVE
BILIRUB SERPL-MCNC: 0.53 MG/DL (ref 0.3–1.2)
BUN BLDV-MCNC: 13 MG/DL (ref 6–20)
BUN/CREAT BLD: ABNORMAL (ref 9–20)
BUPRENORPHINE URINE: NORMAL
CALCIUM SERPL-MCNC: 9.2 MG/DL (ref 8.6–10.4)
CANNABINOID SCREEN URINE: NEGATIVE
CHLORIDE BLD-SCNC: 101 MMOL/L (ref 98–107)
CHOLESTEROL/HDL RATIO: 2.7
CHOLESTEROL: 185 MG/DL
CO2: 26 MMOL/L (ref 20–31)
COCAINE METABOLITE, URINE: NEGATIVE
CREAT SERPL-MCNC: 0.8 MG/DL (ref 0.5–0.9)
DIFFERENTIAL TYPE: ABNORMAL
EOSINOPHILS RELATIVE PERCENT: 2 % (ref 0–4)
ESTIMATED AVERAGE GLUCOSE: 97 MG/DL
FOLATE: 4.4 NG/ML
GFR AFRICAN AMERICAN: >60 ML/MIN
GFR NON-AFRICAN AMERICAN: >60 ML/MIN
GFR SERPL CREATININE-BSD FRML MDRD: ABNORMAL ML/MIN/{1.73_M2}
GFR SERPL CREATININE-BSD FRML MDRD: ABNORMAL ML/MIN/{1.73_M2}
GLUCOSE BLD-MCNC: 83 MG/DL (ref 70–99)
HBA1C MFR BLD: 5 % (ref 4–6)
HCT VFR BLD CALC: 35.1 % (ref 36–46)
HDLC SERPL-MCNC: 69 MG/DL
HEMOGLOBIN: 11.4 G/DL (ref 12–16)
IMMATURE GRANULOCYTES: ABNORMAL %
IRON SATURATION: 15 % (ref 20–55)
IRON: 42 UG/DL (ref 37–145)
LDL CHOLESTEROL: 107 MG/DL (ref 0–130)
LYMPHOCYTES # BLD: 37 % (ref 24–44)
MAGNESIUM: 2 MG/DL (ref 1.6–2.6)
MCH RBC QN AUTO: 26.5 PG (ref 26–34)
MCHC RBC AUTO-ENTMCNC: 32.4 G/DL (ref 31–37)
MCV RBC AUTO: 81.9 FL (ref 80–100)
MDMA URINE: NORMAL
METHADONE SCREEN, URINE: NEGATIVE
METHAMPHETAMINE, URINE: NORMAL
MONOCYTES # BLD: 6 % (ref 1–7)
NRBC AUTOMATED: ABNORMAL PER 100 WBC
OPIATES, URINE: NEGATIVE
OXYCODONE SCREEN URINE: NEGATIVE
PDW BLD-RTO: 14.8 % (ref 11.5–14.9)
PHENCYCLIDINE, URINE: NEGATIVE
PLATELET # BLD: 336 K/UL (ref 150–450)
PLATELET ESTIMATE: ABNORMAL
PMV BLD AUTO: 7.2 FL (ref 6–12)
POTASSIUM SERPL-SCNC: 3.9 MMOL/L (ref 3.7–5.3)
PROPOXYPHENE, URINE: NORMAL
PTH INTACT: 71.22 PG/ML (ref 15–65)
RBC # BLD: 4.28 M/UL (ref 4–5.2)
RBC # BLD: ABNORMAL 10*6/UL
SEG NEUTROPHILS: 54 % (ref 36–66)
SEGMENTED NEUTROPHILS ABSOLUTE COUNT: 4.2 K/UL (ref 1.3–9.1)
SODIUM BLD-SCNC: 136 MMOL/L (ref 135–144)
TEST INFORMATION: NORMAL
TOTAL IRON BINDING CAPACITY: 278 UG/DL (ref 250–450)
TOTAL PROTEIN: 8.5 G/DL (ref 6.4–8.3)
TRICYCLIC ANTIDEPRESSANTS, UR: NORMAL
TRIGL SERPL-MCNC: 46 MG/DL
TSH SERPL DL<=0.05 MIU/L-ACNC: 2.15 MIU/L (ref 0.3–5)
UNSATURATED IRON BINDING CAPACITY: 236 UG/DL (ref 112–347)
VITAMIN B-12: 450 PG/ML (ref 232–1245)
VITAMIN D 25-HYDROXY: 23 NG/ML (ref 30–100)
VLDLC SERPL CALC-MCNC: NORMAL MG/DL (ref 1–30)
WBC # BLD: 7.7 K/UL (ref 3.5–11)
WBC # BLD: ABNORMAL 10*3/UL

## 2021-10-28 PROCEDURE — 84590 ASSAY OF VITAMIN A: CPT

## 2021-10-28 PROCEDURE — 82306 VITAMIN D 25 HYDROXY: CPT

## 2021-10-28 PROCEDURE — 84425 ASSAY OF VITAMIN B-1: CPT

## 2021-10-28 PROCEDURE — 84443 ASSAY THYROID STIM HORMONE: CPT

## 2021-10-28 PROCEDURE — 85025 COMPLETE CBC W/AUTO DIFF WBC: CPT

## 2021-10-28 PROCEDURE — 83970 ASSAY OF PARATHORMONE: CPT

## 2021-10-28 PROCEDURE — 80061 LIPID PANEL: CPT

## 2021-10-28 PROCEDURE — 36415 COLL VENOUS BLD VENIPUNCTURE: CPT

## 2021-10-28 PROCEDURE — 83550 IRON BINDING TEST: CPT

## 2021-10-28 PROCEDURE — 84630 ASSAY OF ZINC: CPT

## 2021-10-28 PROCEDURE — 83735 ASSAY OF MAGNESIUM: CPT

## 2021-10-28 PROCEDURE — 82746 ASSAY OF FOLIC ACID SERUM: CPT

## 2021-10-28 PROCEDURE — 83540 ASSAY OF IRON: CPT

## 2021-10-28 PROCEDURE — 82607 VITAMIN B-12: CPT

## 2021-10-28 PROCEDURE — 80307 DRUG TEST PRSMV CHEM ANLYZR: CPT

## 2021-10-28 PROCEDURE — 83036 HEMOGLOBIN GLYCOSYLATED A1C: CPT

## 2021-10-28 PROCEDURE — 80053 COMPREHEN METABOLIC PANEL: CPT

## 2021-10-28 RX ORDER — CHOLECALCIFEROL (VITAMIN D3) 125 MCG
500 CAPSULE ORAL DAILY
Qty: 30 TABLET | Refills: 11 | Status: ON HOLD | OUTPATIENT
Start: 2021-10-28 | End: 2022-03-08 | Stop reason: HOSPADM

## 2021-10-28 RX ORDER — FOLIC ACID 1 MG/1
1 TABLET ORAL DAILY
Qty: 30 TABLET | Refills: 2 | Status: ON HOLD | OUTPATIENT
Start: 2021-10-28 | End: 2022-03-08 | Stop reason: HOSPADM

## 2021-10-28 RX ORDER — ERGOCALCIFEROL 1.25 MG/1
50000 CAPSULE ORAL WEEKLY
Qty: 8 CAPSULE | Refills: 0 | Status: SHIPPED | OUTPATIENT
Start: 2021-10-28 | End: 2022-02-25 | Stop reason: ALTCHOICE

## 2021-10-29 ENCOUNTER — OFFICE VISIT (OUTPATIENT)
Dept: BARIATRICS/WEIGHT MGMT | Age: 31
End: 2021-10-29
Payer: MEDICARE

## 2021-10-29 VITALS
WEIGHT: 293 LBS | HEART RATE: 90 BPM | SYSTOLIC BLOOD PRESSURE: 108 MMHG | HEIGHT: 66 IN | BODY MASS INDEX: 47.09 KG/M2 | DIASTOLIC BLOOD PRESSURE: 82 MMHG

## 2021-10-29 DIAGNOSIS — Z90.3 HISTORY OF SLEEVE GASTRECTOMY: ICD-10-CM

## 2021-10-29 DIAGNOSIS — E53.8 LOW FOLATE: ICD-10-CM

## 2021-10-29 DIAGNOSIS — E55.9 VITAMIN D DEFICIENCY: ICD-10-CM

## 2021-10-29 DIAGNOSIS — K21.00 HIATAL HERNIA WITH GERD AND ESOPHAGITIS: Primary | ICD-10-CM

## 2021-10-29 DIAGNOSIS — E66.01 MORBID OBESITY (HCC): ICD-10-CM

## 2021-10-29 DIAGNOSIS — E53.8 LOW VITAMIN B12 LEVEL: ICD-10-CM

## 2021-10-29 DIAGNOSIS — K44.9 HIATAL HERNIA WITH GERD AND ESOPHAGITIS: Primary | ICD-10-CM

## 2021-10-29 PROBLEM — R79.89 LOW VITAMIN B12 LEVEL: Status: ACTIVE | Noted: 2021-10-29

## 2021-10-29 PROCEDURE — G8427 DOCREV CUR MEDS BY ELIG CLIN: HCPCS | Performed by: NURSE PRACTITIONER

## 2021-10-29 PROCEDURE — 99213 OFFICE O/P EST LOW 20 MIN: CPT | Performed by: NURSE PRACTITIONER

## 2021-10-29 PROCEDURE — G8417 CALC BMI ABV UP PARAM F/U: HCPCS | Performed by: NURSE PRACTITIONER

## 2021-10-29 PROCEDURE — 1036F TOBACCO NON-USER: CPT | Performed by: NURSE PRACTITIONER

## 2021-10-29 PROCEDURE — G8484 FLU IMMUNIZE NO ADMIN: HCPCS | Performed by: NURSE PRACTITIONER

## 2021-10-29 NOTE — PROGRESS NOTES
Medical Weight Management Progress Note    Subjective     Patient being seen for medically supervised weight loss for the chronic conditions of GERD. She had sleeve gastrectomy at another facility in 2019. Highest weight was 360 lbs and got down to 250 lbs. She developed GERD and has had weight regain. She is interested in revision surgery. She is working on the behavior changes discussed at the initial appointment. Patient continues on diet plan. Physical activity includes walking and stairs. Weight loss of 9 lbs since last visit. Psych eval completed and clearance received. EGD completed and H Pylori negative. No current issues. Working toward bariatric surgery:    [] Sleeve Gastrectomy                                                           [] Jesus-en-Y Gastric Bypass               [x] Revision of Sleeve Gastrectomy to Jesus-en-Y Gastric Bypass    Allergies: Allergies   Allergen Reactions    Latex Dermatitis       Past Medical History:     Past Medical History:   Diagnosis Date    Abnormal Pap smear of cervix     Anemia complicating pregnancy in second trimester 3/11/2015    Gastroesophageal reflux disease 2019    Morbidly obese ([address]    .     Past Surgical History:  Past Surgical History:   Procedure Laterality Date     SECTION  07/20/15    GASTRIC BAND  2019    GASTRIC SLEEVE    UPPER GASTROINTESTINAL ENDOSCOPY N/A 2019    EGD BIOPSY performed by Sukumar Long MD at [place] [id number]  10/08/2021    EGD BIOPSY (N/A )    UPPER GASTROINTESTINAL ENDOSCOPY N/A 10/8/2021    [facility]       Family History:  Family History   Problem Relation Age of Onset    Cancer Paternal Grandfather         unknown- Lung     Thyroid Disease Maternal Grandmother     Diabetes Maternal Grandfather     Cancer Maternal Grandfather         throat    Hypertension Maternal Grandfather  Depression Mother     Cancer Maternal Aunt         stomach    Cancer Maternal Uncle         prostate    Stroke Maternal Uncle     Diabetes Maternal Uncle        Social History:  Social History     Socioeconomic History    Marital status: Single     Spouse name: Not on file    Number of children: Not on file    Years of education: Not on file    Highest education level: Not on file   Occupational History    Not on file   Tobacco Use    Smoking status: Never Smoker    Smokeless tobacco: Never Used   Vaping Use    Vaping Use: Never used   Substance and Sexual Activity    Alcohol use: No    Drug use: No    Sexual activity: Yes     Partners: Male     Birth control/protection: I.U.D. Other Topics Concern    Not on file   Social History Narrative    Not on file     Social Determinants of Health     Financial Resource Strain: High Risk    Difficulty of Paying Living Expenses: Very hard   Food Insecurity: No Food Insecurity    Worried About Running Out of Food in the Last Year: Never true    Amber of Food in the Last Year: Never true   Transportation Needs:     Lack of Transportation (Medical):      Lack of Transportation (Non-Medical):    Physical Activity:     Days of Exercise per Week:     Minutes of Exercise per Session:    Stress:     Feeling of Stress :    Social Connections:     Frequency of Communication with Friends and Family:     Frequency of Social Gatherings with Friends and Family:     Attends Quaker Services:     Active Member of Clubs or Organizations:     Attends Club or Organization Meetings:     Marital Status:    Intimate Partner Violence:     Fear of Current or Ex-Partner:     Emotionally Abused:     Physically Abused:     Sexually Abused:        Current Medications:  Current Outpatient Medications   Medication Sig Dispense Refill    vitamin D (ERGOCALCIFEROL) 1.25 MG (48719 UT) CAPS capsule Take 1 capsule by mouth once a week for 8 doses 8 capsule 0    vitamin B-12 (CYANOCOBALAMIN) 500 MCG tablet Take 1 tablet by mouth daily 30 tablet 11    folic acid (FOLVITE) 1 MG tablet Take 1 tablet by mouth daily 30 tablet 2    pantoprazole (PROTONIX) 40 MG tablet Take 1 tablet by mouth daily 30 tablet 3    famotidine (PEPCID) 20 MG tablet Take 1 tablet by mouth nightly 30 tablet 3     No current facility-administered medications for this visit. Vital Signs:  /82 (Site: Right Upper Arm, Position: Sitting, Cuff Size: Large Adult)   Pulse 90   Ht 5' 5.5\" (1.664 m)   Wt (!) 303 lb (137.4 kg)   BMI 49.65 kg/m²     BMI/Height/Weight:  Body mass index is 49.65 kg/m². Review of Systems - A review of systems was performed. All was negative unless otherwise documented in HPI. Constitutional: Negative for fever, chills and diaphoresis. HENT: Negative for hearing loss and trouble swallowing. Eyes: Negative for photophobia and visual disturbance. Respiratory: Negative for cough, shortness of breath and wheezing. Cardiovascular: Negative for chest pain and palpitations. Gastrointestinal: Negative for nausea, vomiting, abdominal pain, diarrhea, constipation, blood in stool and abdominal distention. Endocrine: Negative for polydipsia, polyphagia and polyuria. Genitourinary: Negative for dysuria, frequency, hematuria and difficulty urinating. Musculoskeletal: Negative for myalgias, joint swelling. Skin: Negative for pallor and rash. Neurological: Negative for dizziness, tremors, light-headedness and headaches. Psychiatric/Behavioral: Negative for sleep disturbance and dysphoric mood. Objective:      Physical Exam   Vital signs reviewed. General: Well-developed and well-nourished. No acute distress. Skin: Warm, dry and intact. HEENT: Normocephalic. EOMs intact. Conjunctivae normal. Neck supple. Cardiovascular: Normal rate, regular rhythm. Pulmonary/Chest: Normal effort. Lungs clear to auscultation. No rales, rhonchi or wheezing. Abdominal: Positive bowel sounds. Soft, nontender. Nondistended. Musculoskeletal: Movement x4. No edema. Neurological: Gait normal. Alert and oriented to person, place, and time. Psychiatric: Normal mood and affect. Speech and behavior normal. Judgment and thought content normal. Cognition and memory intact. Assessment:       Diagnosis Orders   1. Hiatal hernia with GERD and esophagitis  Nicotine, Blood   2. History of sleeve gastrectomy  Nicotine, Blood   3. Morbid obesity (HCC)  Nicotine, Blood   4. Low vitamin B12 level  Nicotine, Blood   5. Low folate  Nicotine, Blood   6. Vitamin D deficiency  Nicotine, Blood       Plan:    Dietitian visit today. Patient was encouraged to journal all food intake. Keep calorie level at approximately 1400-6001. Protein intake is to be a minimum of 60-80 grams per day. Water drinking was encouraged with a goal of 64oz-128oz daily. Beverages to be calorie free except for milk. Every other beverage should be water. Avoid soda. Continue to increase level of physical activity. Encouraged use of exercise log. Labs reviewed and discussed with patient. Vitamin D, B12 and folate low and already treated. Nicotine ordered per surgeon request.     Follow-up  No follow-ups on file. Orders this encounter:  Orders Placed This Encounter   Procedures    Nicotine, Blood     Standing Status:   Future     Standing Expiration Date:   10/30/2022       Prescriptions this encounter:  No orders of the defined types were placed in this encounter.       Electronically signed by:  Lalo Álvarez CNP

## 2021-10-29 NOTE — PROGRESS NOTES
Medical Nutrition Therapy   Metabolic and Bariatric Surgery         Supervised diet and exercise preparation  Visit 3 out of 3  Pt reports:      Changes in eating patterns to promote health are noted below on the goals number 19-22    Vitals: Wt Readings from Last 3 Encounters:   10/29/21 (!) 303 lb (137.4 kg)   10/08/21 (!) 312 lb (141.5 kg)   09/27/21 (!) 312 lb (141.5 kg)         Nutrition Assessment:   PES: Knowledge deficit related to healthy behaviors that support weight management post weight loss surgery as evidenced by Body mass index is 49.65 kg/m². Nutrition Assessment of Goal Attainment:  TREATMENT GOALS:    1. Pt  Completed 0 out of 0 goals. 2.TREATMENT GOALS FOR UPCOMING WEEK: continue all previous goals and add: # 0    All goals were planned with and agreed on by the patient. I want to improve my health because I want to live longer! appt # NA G What is your next step? C 1 2 3 4 5 6 7 8 9     2  one I will read the education binder provided to me and the    10 50              2 I will make my pschological evaluation appoinment. x              2  3 I will bring this goal card to every appointment. 100 100 100            x 4 I will eliminate all tobacco/nicotine. x 5 I will limit alcoholic beverages to 2-8OW per week. x 6 I will limit dining out to 3 times per week or less. x 7 I will eliminate sugary beverages. x 8 I will eliminate carbonated beverages. x 9 I will eliminate drinking with a straw. x 10 I will limit caffeinated beverages to 16oz daily. x 11 I will limit cold cereals prepared with milk. x 12 I will do a 5 minute reflection. x 13 I will food journal daily. 2  14 I will log my exercise daily. 0 100 100           2  15 I will determine my  calcium and multivitamin plan.  (needs Calcium)    100            x 16 I will purchase multivitamin. x 17 I will start taking multivitamins following my plan. 2  18 I will have 1-2 servings of protein present at each meal.    100           2  19 I will eat every 3-5 hours. 100           0  20 I will drink 64oz of fluid daily. 3-4 bottles water/day x 100               21 I will follow the 15-30-15 guideline. x                22 I will eat protein first at all meals followed by vegetables  Fruit and lastly whole grains. x              2  23 My first one diet neutral approach is:  I will follow bariatric behaviors. 48 100             24 my second diet neutral approach is:                 25 My third diet neutral approach is:                     Questions asked for goal understanding:                                                  Do you understand your goals? Do you have the information you need to achieve your goals? Do you have any questions  right now? [x]  Consistent goal achievement in the program thus far and further success with goals is expected. []  Unable to consistently make progress in goal achievement. At this time patient is not moving forward  in developing the skills needed for success after surgery. Plan:    Continue to follow monthly and review goals.          []  Nutrition visits complete    [x]

## 2021-10-31 LAB
RETINYL PALMITATE: 0.02 MG/L (ref 0–0.1)
VITAMIN A LEVEL: 0.24 MG/L (ref 0.3–1.2)
VITAMIN A, INTERP: ABNORMAL
ZINC: 74.6 UG/DL (ref 60–120)

## 2021-11-02 LAB — VITAMIN B1 WHOLE BLOOD: 61 NMOL/L (ref 70–180)

## 2021-11-05 ENCOUNTER — TELEPHONE (OUTPATIENT)
Dept: BARIATRICS/WEIGHT MGMT | Age: 31
End: 2021-11-05

## 2021-11-05 NOTE — TELEPHONE ENCOUNTER
Patient's record has been submitted to the insurance company on 11/5/21 for authorization to schedule surgery. Instructions to patient: if patient calls for status on authorization to schedule surgery please instruct patient that it could take up to 30 days for an authorization. Once authorization is received the insurance specialists will contact the patient to schedule their procedure.       Program fee paid in full yes

## 2021-11-19 ENCOUNTER — HOSPITAL ENCOUNTER (OUTPATIENT)
Age: 31
Setting detail: SPECIMEN
Discharge: HOME | End: 2021-11-19

## 2021-11-19 ENCOUNTER — OFFICE VISIT (OUTPATIENT)
Dept: OBGYN CLINIC | Age: 31
End: 2021-11-19
Payer: MEDICARE

## 2021-11-19 VITALS
WEIGHT: 293 LBS | HEIGHT: 66 IN | BODY MASS INDEX: 47.09 KG/M2 | DIASTOLIC BLOOD PRESSURE: 64 MMHG | SYSTOLIC BLOOD PRESSURE: 108 MMHG

## 2021-11-19 DIAGNOSIS — N76.0 ACUTE VAGINITIS: ICD-10-CM

## 2021-11-19 DIAGNOSIS — Z11.3 SCREEN FOR STD (SEXUALLY TRANSMITTED DISEASE): ICD-10-CM

## 2021-11-19 DIAGNOSIS — Z11.3 SCREEN FOR STD (SEXUALLY TRANSMITTED DISEASE): Primary | ICD-10-CM

## 2021-11-19 LAB
DIRECT EXAM: ABNORMAL
Lab: ABNORMAL
SPECIMEN DESCRIPTION: ABNORMAL

## 2021-11-19 PROCEDURE — G8427 DOCREV CUR MEDS BY ELIG CLIN: HCPCS | Performed by: NURSE PRACTITIONER

## 2021-11-19 PROCEDURE — 99213 OFFICE O/P EST LOW 20 MIN: CPT | Performed by: NURSE PRACTITIONER

## 2021-11-19 PROCEDURE — 1036F TOBACCO NON-USER: CPT | Performed by: NURSE PRACTITIONER

## 2021-11-19 PROCEDURE — G8417 CALC BMI ABV UP PARAM F/U: HCPCS | Performed by: NURSE PRACTITIONER

## 2021-11-19 PROCEDURE — G8484 FLU IMMUNIZE NO ADMIN: HCPCS | Performed by: NURSE PRACTITIONER

## 2021-11-19 RX ORDER — CLINDAMYCIN HYDROCHLORIDE 300 MG/1
300 CAPSULE ORAL 2 TIMES DAILY
Qty: 14 CAPSULE | Refills: 0 | Status: SHIPPED | OUTPATIENT
Start: 2021-11-19 | End: 2022-02-14 | Stop reason: SDUPTHER

## 2021-11-19 RX ORDER — FLUCONAZOLE 150 MG/1
150 TABLET ORAL ONCE
Qty: 2 TABLET | Refills: 2 | Status: SHIPPED | OUTPATIENT
Start: 2021-11-19 | End: 2021-11-19

## 2021-11-19 NOTE — PROGRESS NOTES
Ramu Morgan  2021    YOB: 1990          The patient was seen today. She is here regarding vaginal odor and discharge. Hx of frequent BV infections. Uses boric acid with little relief. Denies new partner for past 4 years. Mirena IUD in place 4 years [facility]. No longer getting menses with IUD in place. Hx of herpes - denies active outbreaks. Requesting to repeat blood testing for herpes. Her bowels are regular and she is voiding without difficulty. HPI:  Ramu Morgan is a 31 y.o. female [id number]     Vaginal odor and discharge.       OB History    Para Term  AB Living   3 3 3 0 0 3   SAB IAB Ectopic Molar Multiple Live Births   0 0 0 0 0 3      # Outcome Date GA Lbr Jonathon/2nd Weight Sex Delivery Anes PTL Lv   3 Term 17 39w0d  8 lb 10 oz (3.912 kg) F CS-LTranv  N ADRIA   2 Term 07/20/15 39w4d  9 lb 3.4 oz (4.179 kg) M CS-LTranv Spinal N ADRIA      Apgar1: 5  Apgar5: 8   1 Term 04 40w0d  7 lb 8 oz (3.402 kg) M Vag-Spont   ADRIA       Past Medical History:   Diagnosis Date    Abnormal Pap smear of cervix 1120    Anemia complicating pregnancy in second trimester 3/11/2015    Gastroesophageal reflux disease 2019    Morbidly obese ([address]        Past Surgical History:   Procedure Laterality Date     SECTION  07/20/15    GASTRIC BAND  2019    GASTRIC SLEEVE    UPPER GASTROINTESTINAL ENDOSCOPY N/A 2019    EGD BIOPSY performed by Frieda Beltran MD at [facility]  10/08/2021    EGD BIOPSY (N/A )    UPPER GASTROINTESTINAL ENDOSCOPY N/A 10/8/2021    EGD BIOPSY performed by Michael Peña DO at [address] [facility].       Family History   Problem Relation Age of Onset    Cancer Paternal Grandfather         unknown- Lung     Thyroid Disease Maternal Grandmother     Diabetes Maternal Grandfather     Cancer Maternal Grandfather         throat    Hypertension Maternal Grandfather  Depression Mother     Cancer Maternal Aunt         stomach    Cancer Maternal Uncle         prostate    Stroke Maternal Uncle     Diabetes Maternal Uncle        Social History     Socioeconomic History    Marital status: Single     Spouse name: Not on file    Number of children: Not on file    Years of education: Not on file    Highest education level: Not on file   Occupational History    Not on file   Tobacco Use    Smoking status: Never Smoker    Smokeless tobacco: Never Used   Vaping Use    Vaping Use: Never used   Substance and Sexual Activity    Alcohol use: No    Drug use: No    Sexual activity: Yes     Partners: Male     Birth control/protection: I.U.D. Other Topics Concern    Not on file   Social History Narrative    Not on file     Social Determinants of Health     Financial Resource Strain: High Risk    Difficulty of Paying Living Expenses: Very hard   Food Insecurity: No Food Insecurity    Worried About Running Out of Food in the Last Year: Never true    Amber of Food in the Last Year: Never true   Transportation Needs:     Lack of Transportation (Medical): Not on file    Lack of Transportation (Non-Medical):  Not on file   Physical Activity:     Days of Exercise per Week: Not on file    Minutes of Exercise per Session: Not on file   Stress:     Feeling of Stress : Not on file   Social Connections:     Frequency of Communication with Friends and Family: Not on file    Frequency of Social Gatherings with Friends and Family: Not on file    Attends Anglican Services: Not on file    Active Member of Clubs or Organizations: Not on file    Attends Club or Organization Meetings: Not on file    Marital Status: Not on file   Intimate Partner Violence:     Fear of Current or Ex-Partner: Not on file    Emotionally Abused: Not on file    Physically Abused: Not on file    Sexually Abused: Not on file   Housing Stability:     Unable to Pay for Housing in the Last Year: Not melena  Genito-Urinary ROS: No Dysuria, Hematuria or Nocturia. No Urinary Incontinence or Vaginal Discharge. + vaginal odor and discharge  Musculoskeletal ROS: No Arthralgia, Arthritis,Gout,Osteoporosis or Rheumatism  Neurological ROS: No CVA, Migraines, Epilepsy, Seizure Hx, or Limb Weakness  Dermatological ROS: No Rash, Itching, Hives, Mole Changes or Cancer          Blood pressure 108/64, height 5' 6\" (1.676 m), weight (!) 312 lb (141.5 kg), not currently breastfeeding. Chaperone for Intimate Exam   Chaperone was offered and accepted as part of the rooming process.  Chaperone: Bonny         Abdomen: Soft non-tender; good bowel sounds. No guarding, rebound or rigidity. No CVA tenderness bilaterally. Extremities: No calf tenderness, DTR 2/4, and No edema bilaterally    Pelvic: Vulva and vagina appear normal. Bimanual exam reveals normal uterus and adnexa. IUD strings visualized. Diagnostics:  No results found.     Lab Results:  Results for orders placed or performed during the hospital encounter of 10/28/21   Zinc   Result Value Ref Range    Zinc 74.6 60.0 - 120.0 ug/dL   Vitamin D 25 Hydroxy   Result Value Ref Range    Vit D, 25-Hydroxy 23.0 (L) 30.0 - 100.0 ng/mL   Vitamin B12 & Folate   Result Value Ref Range    Vitamin B-12 450 232 - 1245 pg/mL    Folate 4.4 (L) >4.8 ng/mL   Vitamin B1   Result Value Ref Range    Vitamin B1,Whole Blood 61 (L) 70 - 180 nmol/L   Vitamin A   Result Value Ref Range    Vitamin A 0.24 (L) 0.30 - 1.20 mg/L    RETINYL PALMITATE 0.02 0.00 - 0.10 mg/L    Vitamin A, Interp See Note    Urine Drug Screen   Result Value Ref Range    Amphetamine Screen, Ur NEGATIVE NEGATIVE    Barbiturate Screen, Ur NEGATIVE NEGATIVE    Benzodiazepine Screen, Urine NEGATIVE NEGATIVE    Cocaine Metabolite, Urine NEGATIVE NEGATIVE    Methadone Screen, Urine NEGATIVE NEGATIVE    Opiates, Urine NEGATIVE NEGATIVE    Phencyclidine, Urine NEGATIVE NEGATIVE    Propoxyphene, Urine NOT REPORTED NEGATIVE Cannabinoid Scrn, Ur NEGATIVE NEGATIVE    Oxycodone Screen, Ur NEGATIVE NEGATIVE    Methamphetamine, Urine NOT REPORTED NEGATIVE    Tricyclic Antidepressants, Urine NOT REPORTED NEGATIVE    MDMA, Urine NOT REPORTED NEGATIVE    Buprenorphine Urine NOT REPORTED NEGATIVE    Test Information       Assay provides medical screening only. The absence of expected drug(s) and/or metabolite(s) may indicate diluted or adulterated urine, limitations of testing or timing of collection.    TSH without Reflex   Result Value Ref Range    TSH 2.15 0.30 - 5.00 mIU/L   PTH, Intact   Result Value Ref Range    Pth Intact 71.22 (H) 15.0 - 65.0 pg/mL   Magnesium   Result Value Ref Range    Magnesium 2.0 1.6 - 2.6 mg/dL   Lipid Panel   Result Value Ref Range    Cholesterol 185 <200 mg/dL    HDL 69 >40 mg/dL    LDL Cholesterol 107 0 - 130 mg/dL    Chol/HDL Ratio 2.7 <5    Triglycerides 46 <150 mg/dL    VLDL NOT REPORTED 1 - 30 mg/dL   Iron and TIBC   Result Value Ref Range    Iron 42 37 - 145 ug/dL    TIBC 278 250 - 450 ug/dL    Iron Saturation 15 (L) 20 - 55 %    UIBC 236 112 - 347 ug/dL   Comprehensive Metabolic Panel   Result Value Ref Range    Glucose 83 70 - 99 mg/dL    BUN 13 6 - 20 mg/dL    CREATININE 0.80 0.50 - 0.90 mg/dL    Bun/Cre Ratio NOT REPORTED 9 - 20    Calcium 9.2 8.6 - 10.4 mg/dL    Sodium 136 135 - 144 mmol/L    Potassium 3.9 3.7 - 5.3 mmol/L    Chloride 101 98 - 107 mmol/L    CO2 26 20 - 31 mmol/L    Anion Gap 9 9 - 17 mmol/L    Alkaline Phosphatase 97 35 - 104 U/L    ALT 11 5 - 33 U/L    AST 14 <32 U/L    Total Bilirubin 0.53 0.3 - 1.2 mg/dL    Total Protein 8.5 (H) 6.4 - 8.3 g/dL    Albumin 4.3 3.5 - 5.2 g/dL    Albumin/Globulin Ratio NOT REPORTED 1.0 - 2.5    GFR Non-African American >60 >60 mL/min    GFR African American >60 >60 mL/min    GFR Comment          GFR Staging NOT REPORTED    Hemoglobin A1C   Result Value Ref Range    Hemoglobin A1C 5.0 4.0 - 6.0 %    Estimated Avg Glucose 97 mg/dL   CBC Auto Differential Result Value Ref Range    WBC 7.7 3.5 - 11.0 k/uL    RBC 4.28 4.0 - 5.2 m/uL    Hemoglobin 11.4 (L) 12.0 - 16.0 g/dL    Hematocrit 35.1 (L) 36 - 46 %    MCV 81.9 80 - 100 fL    MCH 26.5 26 - 34 pg    MCHC 32.4 31 - 37 g/dL    RDW 14.8 11.5 - 14.9 %    Platelets 244 031 - 166 k/uL    MPV 7.2 6.0 - 12.0 fL    NRBC Automated NOT REPORTED per 100 WBC    Differential Type NOT REPORTED     Seg Neutrophils 54 36 - 66 %    Lymphocytes 37 24 - 44 %    Monocytes 6 1 - 7 %    Eosinophils % 2 0 - 4 %    Basophils 1 0 - 2 %    Immature Granulocytes NOT REPORTED 0 %    Segs Absolute 4.20 1.3 - 9.1 k/uL    Absolute Lymph # 2.90 1.0 - 4.8 k/uL    Absolute Mono # 0.40 0.1 - 1.3 k/uL    Absolute Eos # 0.10 0.0 - 0.4 k/uL    Basophils Absolute 0.10 0.0 - 0.2 k/uL    Absolute Immature Granulocyte NOT REPORTED 0.00 - 0.30 k/uL    WBC Morphology NOT REPORTED     RBC Morphology NOT REPORTED     Platelet Estimate NOT REPORTED          Assessment:   Diagnosis Orders   1. Screen for STD (sexually transmitted disease)  HERPES PROFILE    C.trachomatis N.gonorrhoeae DNA    VAGINITIS DNA PROBE   2. Acute vaginitis  clindamycin (CLEOCIN) 300 MG capsule    fluconazole (DIFLUCAN) 150 MG tablet    C.trachomatis N.gonorrhoeae DNA    VAGINITIS DNA PROBE     Patient Active Problem List    Diagnosis Date Noted    H/O abnormal cervical Papanicolaou smear 03/11/2015     Priority: High     3/11/2015 Colpo with physician      Rh+/RI/GBS- 06/23/2015     Priority: Medium    Anal warts 06/16/2015     Priority: Medium    Low vitamin B12 level 10/29/2021    Low folate 10/29/2021    Vitamin D deficiency 10/29/2021    History of sleeve gastrectomy 07/23/2021    Morbid obesity ([facility] [address]     Status post gastric surgery     S/P bariatric surgery 09/05/2019    Hiatal hernia with GERD and esophagitis 09/05/2019    H/O LGSIL (1/29/15) 09/28/2015     Negative colpo on 4/7/15             PLAN:  Return if symptoms worsen or fail to improve.   Vaginal cultures collected. Lab slip given for herpes profile. Script for cleocin and diflucan sent- pending culture results. Repeat Annual every 1 year  Cervical Cytology Evaluation begins at 23years old. If Negative Cytology, Follow-up screening per current guidelines. Return to the office in 2 weeks. Counseled on preventative health maintenance follow-up. Orders Placed This Encounter   Procedures    C.trachomatis N.gonorrhoeae DNA     Standing Status:   Future     Standing Expiration Date:   11/19/2022    VAGINITIS DNA PROBE     Standing Status:   Future     Standing Expiration Date:   11/19/2022    HERPES PROFILE     Standing Status:   Future     Standing Expiration Date:   12/19/2021           The patient, Corky Manzano is a 31 y.o. female, was seen with a total time spent of 20 minutes for the visit on this date of service by the E/M provider. The time component had both face to face and non face to face time spent in determining the total time component. Counseling and education regarding her diagnosis listed below and her options regarding those diagnoses were also included in determining her time component. Diagnosis Orders   1. Screen for STD (sexually transmitted disease)  HERPES PROFILE    C.trachomatis N.gonorrhoeae DNA    VAGINITIS DNA PROBE   2. Acute vaginitis  clindamycin (CLEOCIN) 300 MG capsule    fluconazole (DIFLUCAN) 150 MG tablet    C.trachomatis N.gonorrhoeae DNA    VAGINITIS DNA PROBE        The patient had her preventative health maintenance recommendations and follow-up reviewed with her at the completion of her visit.

## 2021-11-22 ENCOUNTER — TELEPHONE (OUTPATIENT)
Dept: OBGYN CLINIC | Age: 31
End: 2021-11-22

## 2021-11-22 NOTE — TELEPHONE ENCOUNTER
----- Message from PRADIP Brandon - CNP sent at 11/22/2021  7:41 AM EST -----  +BV- cleocin 300mg PO BID X 7 days  Patient was sent script from office last week- let patient know results

## 2021-12-01 NOTE — TELEPHONE ENCOUNTER
All clinicals faxed to [facility]. Originally sent electronically on 11/5/21 but due to transmission error was not received.

## 2021-12-03 NOTE — TELEPHONE ENCOUNTER
LVM for patient that surgery has been approved and I will reach out to her next week to discuss a surgery date.

## 2021-12-28 NOTE — TELEPHONE ENCOUNTER
Spoke with patient regarding getting nicotine test done. Patient states she will go this week. I let her know I would call her next week and get her scheduled for end of February.

## 2021-12-30 ENCOUNTER — HOSPITAL ENCOUNTER (EMERGENCY)
Age: 31
Discharge: HOME | End: 2021-12-31
Attending: EMERGENCY MEDICINE
Payer: MEDICARE

## 2021-12-30 VITALS
RESPIRATION RATE: 16 BRPM | HEART RATE: 95 BPM | DIASTOLIC BLOOD PRESSURE: 78 MMHG | OXYGEN SATURATION: 100 % | TEMPERATURE: 97 F | SYSTOLIC BLOOD PRESSURE: 132 MMHG

## 2021-12-30 DIAGNOSIS — U07.1 COVID-19: Primary | ICD-10-CM

## 2021-12-30 LAB
SARS-COV-2, RAPID: DETECTED
SARS-COV-2, RAPID: DETECTED
SPECIMEN DESCRIPTION: ABNORMAL
SPECIMEN DESCRIPTION: ABNORMAL

## 2021-12-30 PROCEDURE — 87635 SARS-COV-2 COVID-19 AMP PRB: CPT

## 2021-12-30 PROCEDURE — 99283 EMERGENCY DEPT VISIT LOW MDM: CPT

## 2021-12-31 PROCEDURE — 6370000000 HC RX 637 (ALT 250 FOR IP): Performed by: EMERGENCY MEDICINE

## 2021-12-31 PROCEDURE — 6360000002 HC RX W HCPCS: Performed by: EMERGENCY MEDICINE

## 2021-12-31 RX ORDER — ONDANSETRON 4 MG/1
4 TABLET, ORALLY DISINTEGRATING ORAL EVERY 8 HOURS PRN
Qty: 20 TABLET | Refills: 0 | Status: SHIPPED | OUTPATIENT
Start: 2021-12-31 | End: 2022-02-21 | Stop reason: ALTCHOICE

## 2021-12-31 RX ORDER — BENZONATATE 100 MG/1
100 CAPSULE ORAL 3 TIMES DAILY PRN
Qty: 21 CAPSULE | Refills: 0 | Status: SHIPPED | OUTPATIENT
Start: 2021-12-31 | End: 2022-01-07

## 2021-12-31 RX ORDER — BENZONATATE 100 MG/1
100 CAPSULE ORAL ONCE
Status: COMPLETED | OUTPATIENT
Start: 2021-12-31 | End: 2021-12-31

## 2021-12-31 RX ORDER — DEXAMETHASONE 4 MG/1
6 TABLET ORAL EVERY 12 HOURS SCHEDULED
Status: DISCONTINUED | OUTPATIENT
Start: 2021-12-31 | End: 2021-12-31 | Stop reason: HOSPADM

## 2021-12-31 RX ORDER — IBUPROFEN 800 MG/1
800 TABLET ORAL EVERY 8 HOURS PRN
Qty: 30 TABLET | Refills: 0 | Status: SHIPPED | OUTPATIENT
Start: 2021-12-31 | End: 2022-02-24

## 2021-12-31 RX ADMIN — DEXAMETHASONE 6 MG: 4 TABLET ORAL at 00:38

## 2021-12-31 RX ADMIN — BENZONATATE 100 MG: 100 CAPSULE ORAL at 00:38

## 2021-12-31 NOTE — ED PROVIDER NOTES
[address] [facility]  EMERGENCY DEPARTMENT ENCOUNTER      Pt Name: Shyam Long  MRN: [id number]  [place] 1990  Date of evaluation: 12/30/2021  Provider: Andrew Hart MD    CHIEF COMPLAINT       Chief Complaint   Patient presents with    Cough    Concern For COVID-19       HISTORY OF PRESENT ILLNESS  (Location/Symptom, Timing/Onset, Context/Setting, Quality, Duration, Modifying Factors, Severity.)   Shyam Long is a 27 y.o. female who presents to the emergency department complaining of cough and concern for COVID-19. Patient relates that she was exposed to [employer]. She denies any chest pain. She is having no shortness of breath. She relates she is just concerned about the cough and that is why she came in for evaluation. Nursing Notes were reviewed. REVIEW OF SYSTEMS    (2-9 systems for level 4, 10 or more for level 5)     Review of Systems   Constitutional: Negative for activity change, appetite change, chills, fatigue and fever. HENT: Negative for congestion, ear pain and sore throat. Eyes: Negative for pain, discharge and redness. Respiratory: Positive for cough. Negative for shortness of breath, wheezing and stridor. Cardiovascular: Negative for chest pain. Gastrointestinal: Negative for abdominal pain, constipation, diarrhea, nausea and vomiting. Genitourinary: Negative for decreased urine volume and difficulty urinating. Musculoskeletal: Negative for arthralgias and myalgias. Skin: Negative for color change and rash. Neurological: Negative for dizziness, weakness and headaches. Psychiatric/Behavioral: Negative for behavioral problems and confusion. Except as noted above the remainder of the review of systems was reviewed and negative.        PAST MEDICAL HISTORY     Past Medical History:   Diagnosis Date    Abnormal Pap smear of cervix 7/71/1786    Anemia complicating pregnancy in second trimester 3/11/2015    Gastroesophageal reflux disease 2019    [address]bidly [address] [address]        SURGICAL HISTORY       Past Surgical History:   Procedure Laterality Date     SECTION  07/20/15    GASTRIC BAND  2019    GASTRIC SLEEVE    UPPER GASTROINTESTINAL ENDOSCOPY N/A 2019    EGD BIOPSY performed by Ankita Mccoy MD at [address]  10/08/2021    EGD BIOPSY (N/A )    UPPER GASTROINTESTINAL ENDOSCOPY N/A 10/8/2021    EGD BIOPSY performed by Chidi Dawkins DO at [id number]       Previous Medications    FAMOTIDINE (PEPCID) 20 MG TABLET    Take 1 tablet by mouth nightly    FOLIC ACID (FOLVITE) 1 MG TABLET    Take 1 tablet by mouth daily    PANTOPRAZOLE (PROTONIX) 40 MG TABLET    Take 1 tablet by mouth daily    VITAMIN B-12 (CYANOCOBALAMIN) 500 MCG TABLET    Take 1 tablet by mouth daily    VITAMIN D (ERGOCALCIFEROL) 1.25 MG (79191 UT) CAPS CAPSULE    Take 1 capsule by mouth once a week for 8 doses       ALLERGIES     Latex    FAMILY HISTORY           Problem Relation Age of Onset    Cancer Paternal Grandfather         unknown- Lung     Thyroid Disease Maternal Grandmother     Diabetes Maternal Grandfather     Cancer Maternal Grandfather         throat    Hypertension Maternal Grandfather     Depression Mother     Cancer Maternal Aunt         stomach    Cancer Maternal Uncle         prostate    Stroke Maternal Uncle     Diabetes Maternal Uncle      Family Status   Relation Name Status    PGF      MGM  Alive    MGF      Mother  Alive    Father  Alive    Sister x2 Alive    PGM  Alive    MAunt  (Not Specified)    MUnc  (Not Specified)        SOCIAL HISTORY      reports that she has never smoked. She has never used smokeless tobacco. She reports that she does not drink alcohol and does not use drugs.     PHYSICAL EXAM    (up to 7 for level 4, 8 or more for level 5)     ED Triage Vitals [21 2233]   BP Temp Temp Source Pulse Resp SpO2 Height Weight   132/78 97 °F (36.1 °C) Temporal 95 16 100 % -- --     Physical Exam  Vitals and nursing note reviewed. Constitutional:       General: She is not in acute distress. Appearance: She is well-developed. She is not ill-appearing, toxic-appearing or diaphoretic. HENT:      Head: Normocephalic and atraumatic. Right Ear: External ear normal.      Left Ear: External ear normal.      Nose: Nose normal.      Mouth/Throat:      Mouth: Mucous membranes are moist.   Eyes:      General:         Right eye: No discharge. Left eye: No discharge. Conjunctiva/sclera: Conjunctivae normal.      Pupils: Pupils are equal, round, and reactive to light. Cardiovascular:      Rate and Rhythm: Normal rate and regular rhythm. Heart sounds: Normal heart sounds. No murmur heard. Pulmonary:      Effort: Pulmonary effort is normal. No respiratory distress. Breath sounds: Normal breath sounds. No wheezing, rhonchi or rales. Chest:      Chest wall: No tenderness. Abdominal:      General: Bowel sounds are normal. There is no distension. Palpations: Abdomen is soft. There is no mass. Tenderness: There is no abdominal tenderness. There is no guarding or rebound. Musculoskeletal:         General: Normal range of motion. Cervical back: Normal range of motion and neck supple. Right lower leg: No edema. Left lower leg: No edema. Lymphadenopathy:      Cervical: No cervical adenopathy. Skin:     General: Skin is warm. Findings: No rash. Neurological:      General: No focal deficit present. Mental Status: She is alert and oriented to person, place, and time. Motor: No abnormal muscle tone.    Psychiatric:         Mood and Affect: Mood normal.         Behavior: Behavior normal.         DIAGNOSTIC RESULTS     EKG: All EKG's are interpreted by the Emergency Department Physician who either signs or Co-signs this chart in the absence of a cardiologist.    none    RADIOLOGY:   Non-plain film images such as CT, Ultrasound and MRI are read by the radiologist. Plain radiographic images are visualized and preliminarily interpreted by the emergency physician with the below findings:    Interpretation per the Radiologist below, if available at the time of this note:    No orders to display         ED BEDSIDE ULTRASOUND:   Performed by ED Physician - none    LABS:  Labs Reviewed   COVID-19, RAPID - Abnormal; Notable for the following components:       Result Value    SARS-CoV-2, Rapid DETECTED (*)     All other components within normal limits   COVID-19, RAPID - Abnormal; Notable for the following components:    SARS-CoV-2, Rapid DETECTED (*)     All other components within normal limits       All other labs were within normal range or not returned as of this dictation. EMERGENCY DEPARTMENT COURSE and DIFFERENTIAL DIAGNOSIS/MDM:   Vitals:    Vitals:    12/30/21 2233   BP: 132/78   Pulse: 95   Resp: 16   Temp: 97 °F (36.1 °C)   TempSrc: Temporal   SpO2: 100%     We did discuss COVID positive test.  We also discussed things to do at home to keep her self comfortable as well as what to do in the near future as far as follow-up. We talked about what to return to the emergency department for as well. CONSULTS:  None    PROCEDURES:  None    FINAL IMPRESSION      1. COVID-19          DISPOSITION/PLAN   DISPOSITION Decision To Discharge 12/31/2021 12:31:05 AM      PATIENT REFERRED TO:  No follow-up provider specified.     DISCHARGE MEDICATIONS:  New Prescriptions    BENZONATATE (TESSALON PERLES) 100 MG CAPSULE    Take 1 capsule by mouth 3 times daily as needed for Cough    IBUPROFEN (ADVIL;MOTRIN) 800 MG TABLET    Take 1 tablet by mouth every 8 hours as needed for Pain    ONDANSETRON (ZOFRAN ODT) 4 MG DISINTEGRATING TABLET    Take 1 tablet by mouth every 8 hours as needed for Nausea       (Please note that portions of this note were completed with a voice recognition program.  Efforts were made to edit the dictations but occasionally words are mis-transcribed.)    Taylor Alejo MD  Attending Emergency Physician        Taylor Alejo MD  01/07/22 0031

## 2022-01-03 ENCOUNTER — CARE COORDINATION (OUTPATIENT)
Dept: CARE COORDINATION | Age: 32
End: 2022-01-03

## 2022-01-03 NOTE — CARE COORDINATION
Patient contacted regarding COVID-19 diagnosis. Discussed COVID-19 related testing which was available at this time. Test results were positive. Patient informed of results, if available? Yes. Care Transition Nurse contacted the patient by telephone to perform post discharge assessment. Call within 2 business days of discharge: Yes. Verified name and  with patient as identifiers. Provided introduction to self, and explanation of the CTN/ACM role, and reason for call due to risk factors for infection and/or exposure to COVID-19. Symptoms reviewed with patient who verbalized the following symptoms: fatigue, cough, no new symptoms and no worsening symptoms. Due to no new or worsening symptoms encounter was not routed to provider for escalation. Discussed follow-up appointments. If no appointment was previously scheduled, appointment scheduling offered: No.  [facility] follow up appointment(s): No future appointments. Non-[facility] follow up appointment(s): N/A    Non-face-to-face services provided:  Education of patient/family/caregiver/guardian to support self-management-COVID 19 prevention/precautions, follow CDC recommendations. Advance Care Planning:   Does patient have an Advance Directive:  reviewed and needs to be updated. Educated patient about risk for severe COVID-19 due to risk factors according to CDC guidelines. ACM reviewed discharge instructions, medical action plan and red flag symptoms with the patient who verbalized understanding. Discussed COVID vaccination status: Yes. Education provided on COVID-19 vaccination as appropriate. Discussed exposure protocols and quarantine with CDC Guidelines. Patient was given an opportunity to verbalize any questions and concerns and agrees to contact CTN or health care provider for questions related to their healthcare.     Reviewed and educated patient on any new and changed medications related to discharge diagnosis     Was patient discharged with a pulse oximeter? No Discussed and confirmed pulse oximeter discharge instructions and when to notify provider or seek emergency care. ACM provided contact information. No further follow-up call identified based on severity of symptoms and risk factors. Symptoms improving, taking current prescribed medications as directed.

## 2022-01-07 ASSESSMENT — ENCOUNTER SYMPTOMS
VOMITING: 0
ABDOMINAL PAIN: 0
DIARRHEA: 0
EYE REDNESS: 0
SHORTNESS OF BREATH: 0
EYE DISCHARGE: 0
STRIDOR: 0
EYE PAIN: 0
COUGH: 1
CONSTIPATION: 0
WHEEZING: 0
SORE THROAT: 0
COLOR CHANGE: 0
NAUSEA: 0

## 2022-01-20 ENCOUNTER — TELEPHONE (OUTPATIENT)
Dept: FAMILY MEDICINE CLINIC | Age: 32
End: 2022-01-20

## 2022-02-08 ENCOUNTER — TELEPHONE (OUTPATIENT)
Dept: FAMILY MEDICINE CLINIC | Age: 32
End: 2022-02-08

## 2022-02-08 NOTE — TELEPHONE ENCOUNTER
Left message today and also on 1/20/22 that Dr Mercado Art office is needing medical clearance for upcoming surgery on 3/7/22.   Patient needs to schedule appointment for pre op clearance

## 2022-02-09 ENCOUNTER — HOSPITAL ENCOUNTER (OUTPATIENT)
Age: 32
Discharge: HOME | End: 2022-02-09
Payer: MEDICARE

## 2022-02-09 DIAGNOSIS — Z90.3 HISTORY OF SLEEVE GASTRECTOMY: ICD-10-CM

## 2022-02-09 DIAGNOSIS — K44.9 HIATAL HERNIA WITH GERD AND ESOPHAGITIS: ICD-10-CM

## 2022-02-09 DIAGNOSIS — E55.9 VITAMIN D DEFICIENCY: ICD-10-CM

## 2022-02-09 DIAGNOSIS — E53.8 LOW VITAMIN B12 LEVEL: ICD-10-CM

## 2022-02-09 DIAGNOSIS — K21.00 HIATAL HERNIA WITH GERD AND ESOPHAGITIS: ICD-10-CM

## 2022-02-09 DIAGNOSIS — E53.8 LOW FOLATE: ICD-10-CM

## 2022-02-09 DIAGNOSIS — E66.01 MORBID OBESITY (HCC): ICD-10-CM

## 2022-02-09 PROCEDURE — G0480 DRUG TEST DEF 1-7 CLASSES: HCPCS

## 2022-02-09 PROCEDURE — 36415 COLL VENOUS BLD VENIPUNCTURE: CPT

## 2022-02-11 ENCOUNTER — HOSPITAL ENCOUNTER (OUTPATIENT)
Age: 32
Setting detail: SPECIMEN
Discharge: HOME | End: 2022-02-11

## 2022-02-11 ENCOUNTER — OFFICE VISIT (OUTPATIENT)
Dept: OBGYN CLINIC | Age: 32
End: 2022-02-11
Payer: MEDICARE

## 2022-02-11 VITALS
HEIGHT: 66 IN | WEIGHT: 293 LBS | DIASTOLIC BLOOD PRESSURE: 84 MMHG | BODY MASS INDEX: 47.09 KG/M2 | SYSTOLIC BLOOD PRESSURE: 124 MMHG

## 2022-02-11 DIAGNOSIS — N89.8 VAGINAL DISCHARGE: ICD-10-CM

## 2022-02-11 DIAGNOSIS — N89.8 VAGINAL DISCHARGE: Primary | ICD-10-CM

## 2022-02-11 LAB
CANDIDA SPECIES, DNA PROBE: NEGATIVE
GARDNERELLA VAGINALIS, DNA PROBE: POSITIVE
SOURCE: ABNORMAL
TRICHOMONAS VAGINALIS DNA: NEGATIVE

## 2022-02-11 PROCEDURE — G8427 DOCREV CUR MEDS BY ELIG CLIN: HCPCS | Performed by: NURSE PRACTITIONER

## 2022-02-11 PROCEDURE — 1036F TOBACCO NON-USER: CPT | Performed by: NURSE PRACTITIONER

## 2022-02-11 PROCEDURE — G8484 FLU IMMUNIZE NO ADMIN: HCPCS | Performed by: NURSE PRACTITIONER

## 2022-02-11 PROCEDURE — 99213 OFFICE O/P EST LOW 20 MIN: CPT | Performed by: NURSE PRACTITIONER

## 2022-02-11 PROCEDURE — G8417 CALC BMI ABV UP PARAM F/U: HCPCS | Performed by: NURSE PRACTITIONER

## 2022-02-11 NOTE — PROGRESS NOTES
Liset Martinez  2022    YOB: 1990          The patient was seen today. She is here regarding vaginal discharge, itching, odor x 3 weeks. Using vaginal suppository. Denies new partner. Her bowels are regular and she is voiding without difficulty.      HPI:  Liset Martinez is a 27 y.o. female  vaginal discharge       OB History    Para Term  AB Living   3 3 3 0 0 3   SAB IAB Ectopic Molar Multiple Live Births   0 0 0 0 0 3      # Outcome Date GA Lbr Jonathon/2nd Weight Sex Delivery Anes PTL Lv   3 Term 17 39w0d  8 lb 10 oz (3.912 kg) F CS-LTranv  N ADRIA   2 Term 07/20/15 39w4d  9 lb 3.4 oz (4.179 kg) M CS-LTranv Spinal N ADRIA      Apgar1: 5  Apgar5: 8   1 Term 04 40w0d  7 lb 8 oz (3.402 kg) M Vag-Spont   ADRIA       Past Medical History:   Diagnosis Date    Abnormal Pap smear of cervix     Anemia complicating pregnancy in second trimester 3/11/2015    Gastroesophageal reflux disease 2019    Morbidly obese ([address]        Past Surgical History:   Procedure Laterality Date     SECTION  07/20/15    GASTRIC BAND  2019    GASTRIC SLEEVE    UPPER GASTROINTESTINAL ENDOSCOPY N/A 2019    EGD BIOPSY performed by Elise Razo MD at [place] [address]  10/08/2021    EGD BIOPSY (N/A )    UPPER GASTROINTESTINAL ENDOSCOPY N/A 10/8/2021    EGD BIOPSY performed by Adrianne Gamez DO at [address] [facility].       Family History   Problem Relation Age of Onset    Cancer Paternal Grandfather         unknown- Lung     Thyroid Disease Maternal Grandmother     Diabetes Maternal Grandfather     Cancer Maternal Grandfather         throat    Hypertension Maternal Grandfather     Depression Mother     Cancer Maternal Aunt         stomach    Cancer Maternal Uncle         prostate    Stroke Maternal Uncle     Diabetes Maternal Uncle        Social History     Socioeconomic History    Marital status: Single tablet Take 1 tablet by mouth every 8 hours as needed for Pain 30 tablet 0    vitamin D (ERGOCALCIFEROL) 1.25 MG (19215 UT) CAPS capsule Take 1 capsule by mouth once a week for 8 doses 8 capsule 0    vitamin B-12 (CYANOCOBALAMIN) 500 MCG tablet Take 1 tablet by mouth daily 30 tablet 11    folic acid (FOLVITE) 1 MG tablet Take 1 tablet by mouth daily 30 tablet 2    pantoprazole (PROTONIX) 40 MG tablet Take 1 tablet by mouth daily 30 tablet 3    famotidine (PEPCID) 20 MG tablet Take 1 tablet by mouth nightly 30 tablet 3    ondansetron (ZOFRAN ODT) 4 MG disintegrating tablet Take 1 tablet by mouth every 8 hours as needed for Nausea (Patient not taking: Reported on 2/11/2022) 20 tablet 0     No current facility-administered medications for this visit. ALLERGIES:  Allergies as of 02/11/2022 - Fully Reviewed 02/11/2022   Allergen Reaction Noted    Latex Dermatitis 07/19/2015         REVIEW OF SYSTEMS:    yes   A minimum of an eleven point review of systems was completed. Review Of Systems (11 point):  Constitutional: No fever, chills or malaise; No weight change or fatigue  Head and Eyes: No vision, Headache, Dizziness or trauma in last 12 months  ENT ROS: No hearing, Tinnitis, sinus or taste problems  Hematological and Lymphatic ROS:No Lymphoma, Von Willebrand's, Hemophillia or Bleeding History  Psych ROS: No Depression, Homicidal thoughts,suicidal thoughts, or anxiety  Breast ROS: No prior breast abnormalities or lumps  Respiratory ROS: No SOB, Pneumoniae,Cough, or Pulmonary Embolism History  Cardiovascular ROS: No Chest Pain with Exertion, Palpitations, Syncope, Edema, Arrhythmia  Gastrointestinal ROS: No Indigestion, Heartburn, Nausea, vomiting, Diarrhea, Constipation,or Bowel Changes; No Bloody Stools or melena  Genito-Urinary ROS: No Dysuria, Hematuria or Nocturia.  No Urinary Incontinence or Vaginal Discharge  Musculoskeletal ROS: No Arthralgia, Arthritis,Gout,Osteoporosis or Rheumatism  Neurological ROS: No CVA, Migraines, Epilepsy, Seizure Hx, or Limb Weakness  Dermatological ROS: No Rash, Itching, Hives, Mole Changes or Cancer          Blood pressure 124/84, height 5' 6\" (1.676 m), weight (!) 317 lb (143.8 kg), not currently breastfeeding. Chaperone for Intimate Exam   Chaperone was offered and accepted as part of the rooming process.  Chaperone: Savannah Sebastian MA         Abdomen: Soft non-tender; good bowel sounds. No guarding, rebound or rigidity. No CVA tenderness bilaterally. Extremities: No calf tenderness, DTR 2/4, and No edema bilaterally    Pelvic: pelvic exam extremely limited to do white powder from suppository filling the vagina    Diagnostics:  No results found. Lab Results:  Results for orders placed or performed during the hospital encounter of 12/30/21   COVID-19, Rapid    Specimen: Nasopharyngeal Swab   Result Value Ref Range    Specimen Description . NASOPHARYNGEAL SWAB     SARS-CoV-2, Rapid DETECTED (A) Not Detected   COVID-19, Rapid    Specimen: Nasopharyngeal Swab   Result Value Ref Range    Specimen Description . NASOPHARYNGEAL SWAB     SARS-CoV-2, Rapid DETECTED (A) Not Detected         Assessment:   Diagnosis Orders   1.  Vaginal discharge  VAGINITIS DNA PROBE    C.trachomatis N.gonorrhoeae DNA     Patient Active Problem List    Diagnosis Date Noted    H/O abnormal cervical Papanicolaou smear 03/11/2015     Priority: High     3/11/2015 Colpo with physician      Rh+/RI/GBS- 06/23/2015     Priority: Medium    Anal warts 06/16/2015     Priority: Medium    Low vitamin B12 level 10/29/2021    Low folate 10/29/2021    Vitamin D deficiency 10/29/2021    History of sleeve gastrectomy 07/23/2021    Morbid obesity ([facility] [address]     Status post gastric surgery     S/P bariatric surgery 09/05/2019    Hiatal hernia with GERD and esophagitis 09/05/2019    H/O LGSIL (1/29/15) 09/28/2015     Negative colpo on 4/7/15             PLAN:  Return in about 1 week (around 2/18/2022) for annual.   Instructed to stop using vaginal suppository until after pap smear is collected   Vaginal cultures collected  Repeat Annual every 1 year  Cervical Cytology Evaluation begins at 23years old. If Negative Cytology, Follow-up screening per current guidelines. Return to the office in 1 weeks. Counseled on preventative health maintenance follow-up. Orders Placed This Encounter   Procedures    VAGINITIS DNA PROBE     Standing Status:   Future     Standing Expiration Date:   2/11/2023    C.trachomatis N.gonorrhoeae DNA     Standing Status:   Future     Standing Expiration Date:   8/11/2022           The patient, Gabrielle Meredith is a 27 y.o. female, was seen with a total time spent of 25 minutes for the visit on this date of service by the E/M provider. The time component had both face to face and non face to face time spent in determining the total time component. Counseling and education regarding her diagnosis listed below and her options regarding those diagnoses were also included in determining her time component. Diagnosis Orders   1. Vaginal discharge  VAGINITIS DNA PROBE    C.trachomatis N.gonorrhoeae DNA        The patient had her preventative health maintenance recommendations and follow-up reviewed with her at the completion of her visit.

## 2022-02-13 LAB
3-OH-COTININE: <2 NG/ML
COTININE: <2 NG/ML
NICOTINE: <2 NG/ML

## 2022-02-14 ENCOUNTER — TELEPHONE (OUTPATIENT)
Dept: OBGYN CLINIC | Age: 32
End: 2022-02-14

## 2022-02-14 DIAGNOSIS — N76.0 ACUTE VAGINITIS: ICD-10-CM

## 2022-02-14 RX ORDER — CLINDAMYCIN HYDROCHLORIDE 300 MG/1
300 CAPSULE ORAL 2 TIMES DAILY
Qty: 14 CAPSULE | Refills: 0 | Status: SHIPPED | OUTPATIENT
Start: 2022-02-14 | End: 2022-02-21

## 2022-02-14 NOTE — TELEPHONE ENCOUNTER
----- Message from PRADIP Cho CNP sent at 2/14/2022  8:13 AM EST -----  +BV- flagyl 500mg PO BID x7 days

## 2022-02-17 RX ORDER — SODIUM CHLORIDE, SODIUM LACTATE, POTASSIUM CHLORIDE, CALCIUM CHLORIDE 600; 310; 30; 20 MG/100ML; MG/100ML; MG/100ML; MG/100ML
1000 INJECTION, SOLUTION INTRAVENOUS CONTINUOUS
Status: CANCELLED | OUTPATIENT
Start: 2022-02-17

## 2022-02-21 ENCOUNTER — HOSPITAL ENCOUNTER (OUTPATIENT)
Dept: PREADMISSION TESTING | Age: 32
Discharge: HOME | End: 2022-02-25
Payer: MEDICARE

## 2022-02-21 ENCOUNTER — NURSE ONLY (OUTPATIENT)
Dept: BARIATRICS/WEIGHT MGMT | Age: 32
End: 2022-02-21

## 2022-02-21 ENCOUNTER — HOSPITAL ENCOUNTER (OUTPATIENT)
Dept: GENERAL RADIOLOGY | Age: 32
Discharge: HOME | End: 2022-02-23
Payer: MEDICARE

## 2022-02-21 VITALS
TEMPERATURE: 97.4 F | BODY MASS INDEX: 47.09 KG/M2 | OXYGEN SATURATION: 100 % | DIASTOLIC BLOOD PRESSURE: 84 MMHG | WEIGHT: 293 LBS | HEIGHT: 66 IN | SYSTOLIC BLOOD PRESSURE: 128 MMHG | HEART RATE: 76 BPM | RESPIRATION RATE: 20 BRPM

## 2022-02-21 LAB
ANION GAP SERPL CALCULATED.3IONS-SCNC: 13 MMOL/L (ref 9–17)
BUN BLDV-MCNC: 9 MG/DL (ref 6–20)
CALCIUM SERPL-MCNC: 9 MG/DL (ref 8.6–10.4)
CHLORIDE BLD-SCNC: 103 MMOL/L (ref 98–107)
CO2: 23 MMOL/L (ref 20–31)
CREAT SERPL-MCNC: 0.77 MG/DL (ref 0.5–0.9)
GFR AFRICAN AMERICAN: >60 ML/MIN
GFR NON-AFRICAN AMERICAN: >60 ML/MIN
GFR SERPL CREATININE-BSD FRML MDRD: ABNORMAL ML/MIN/{1.73_M2}
GLUCOSE BLD-MCNC: 65 MG/DL (ref 70–99)
HCT VFR BLD CALC: 39.5 % (ref 36.3–47.1)
HEMOGLOBIN: 12.1 G/DL (ref 11.9–15.1)
INR BLD: 1
MCH RBC QN AUTO: 26.8 PG (ref 25.2–33.5)
MCHC RBC AUTO-ENTMCNC: 30.6 G/DL (ref 28.4–34.8)
MCV RBC AUTO: 87.4 FL (ref 82.6–102.9)
NRBC AUTOMATED: 0 PER 100 WBC
PARTIAL THROMBOPLASTIN TIME: 27.6 SEC (ref 20.5–30.5)
PDW BLD-RTO: 14.6 % (ref 11.8–14.4)
PLATELET # BLD: 380 K/UL (ref 138–453)
PMV BLD AUTO: 9.7 FL (ref 8.1–13.5)
POTASSIUM SERPL-SCNC: 3.7 MMOL/L (ref 3.7–5.3)
PROTHROMBIN TIME: 10.8 SEC (ref 9.1–12.3)
RBC # BLD: 4.52 M/UL (ref 3.95–5.11)
SODIUM BLD-SCNC: 139 MMOL/L (ref 135–144)
WBC # BLD: 9.2 K/UL (ref 3.5–11.3)

## 2022-02-21 PROCEDURE — 85730 THROMBOPLASTIN TIME PARTIAL: CPT

## 2022-02-21 PROCEDURE — 85027 COMPLETE CBC AUTOMATED: CPT

## 2022-02-21 PROCEDURE — 80048 BASIC METABOLIC PNL TOTAL CA: CPT

## 2022-02-21 PROCEDURE — 85610 PROTHROMBIN TIME: CPT

## 2022-02-21 PROCEDURE — 93005 ELECTROCARDIOGRAM TRACING: CPT | Performed by: SURGERY

## 2022-02-21 PROCEDURE — 36415 COLL VENOUS BLD VENIPUNCTURE: CPT

## 2022-02-21 PROCEDURE — 71046 X-RAY EXAM CHEST 2 VIEWS: CPT

## 2022-02-21 PROCEDURE — G0480 DRUG TEST DEF 1-7 CLASSES: HCPCS

## 2022-02-21 NOTE — PROGRESS NOTES
Anesthesia Focused Assessment    Has patient ever tested positive for COVID? Yes, 12/30/21    STOP-BANG Sleep Apnea Questionnaire    SNORE loudly (heard through closed doors)? Yes  TIRED, fatigued, sleepy during daytime? No  OBSERVED stopping breathing during sleep? Yes  High blood PRESSURE being treated? No    BMI over 35? Yes  AGE over 48? No  NECK circumference over 16\"? Yes  GENDER (male)? No             Total 4  High risk 5-8  Intermediate risk 3-4  Low risk 0-2    Obstructive Sleep Apnea: snores but denies apnea, no cpap  If YES, machine used: no     Type 1 DM:   no  T2DM:  no    Coronary Artery Disease:  no  Hypertension:  no    Active smoker:  no  Drinks Alcohol:  no    Dentition: benign    Defib / AICD / Pacemaker: no      Renal Failure/dialysis:  no    Patient was evaluated in PAT & anesthesia guidelines were applied. NPO guidelines, medication instructions and scheduled arrival time were reviewed with patient. Hx of anesthesia complications:  no  Family hx of anesthesia complications:  no                                                                                                                     Anesthesia contacted:   Patient was sent for post PAT anesthesia interview due to 90 day COVID positive protocol. Medical or cardiac clearance ordered: PCP clearance pending, will request copy for chart. Mary Jo Reed PA-C  2/21/22  1:58 PM    Patient/case evaluated by Dr. Dennise Silva, no further orders, see paper note in chart.

## 2022-02-21 NOTE — H&P (VIEW-ONLY)
History and Physical    Pt Name: Jeffory Hamman  MRN: [id number]  YOB: 1990  Date of evaluation: 2022    SUBJECTIVE:   History of Chief Complaint:    Patient presents for PAT appointment. She is s/p sleeve gastrectomy in 2019, says that she has had significant GERD since then. She has also had some weight regain per documentation in Epic. She has been scheduled for sleeve gastrectomy to [facility] bypass. Past Medical History    has a past medical history of Abnormal Pap smear of cervix, Anemia complicating pregnancy in second trimester, COVID-19, Gastroesophageal reflux disease, Morbidly obese ([facility] [address] Snores, and Wellness examination. Past Surgical History   has a past surgical history that includes  section (2015); Gastric Band (2019); Upper gastrointestinal endoscopy (N/A, 2019); and Upper gastrointestinal endoscopy (N/A, 10/08/2021). Medications  Prior to Admission medications    Medication Sig Start Date End Date Taking?  Authorizing Provider   clindamycin (CLEOCIN) 300 MG capsule Take 1 capsule by mouth 2 times daily for 7 days  Patient taking differently: Take 300 mg by mouth 2 times daily Started 2022 Yes PRADIP Feliciano CNP   ibuprofen (ADVIL;MOTRIN) 800 MG tablet Take 1 tablet by mouth every 8 hours as needed for Pain  Patient taking differently: Take 800 mg by mouth every 8 hours as needed for Pain Stop 1 week prior to sx 21  Yes Luci Cantrell MD   vitamin D (ERGOCALCIFEROL) 1.25 MG (19660 UT) CAPS capsule Take 1 capsule by mouth once a week for 8 doses 10/28/21 2/21/22 Yes PRADIP Berrios CNP   vitamin B-12 (CYANOCOBALAMIN) 500 MCG tablet Take 1 tablet by mouth daily 10/28/21 10/28/22 Yes PRADIP Berrios CNP   folic acid (FOLVITE) 1 MG tablet Take 1 tablet by mouth daily 10/28/21  Yes PRADIP Berrios CNP   pantoprazole (PROTONIX) 40 MG tablet Take 1 tablet by mouth daily  Patient taking differently: Take 40 mg by mouth daily In am 7/23/21  Yes PRADIP Feldman CNP   famotidine (PEPCID) 20 MG tablet Take 1 tablet by mouth nightly 7/23/21  Yes PRADIP Feldman CNP     Allergies  is allergic to latex. Family History  family history includes Cancer in her maternal aunt, maternal grandfather, maternal uncle, and paternal grandfather; Depression in her mother; Diabetes in her maternal grandfather and maternal uncle; Hypertension in her maternal grandfather; Stroke in her maternal uncle; Thyroid Disease in her maternal grandmother. Social History   reports that she has never smoked. She has never used smokeless tobacco.   reports no history of alcohol use. reports no history of drug use. Marital Status single    Review of Systems:  CONSTITUTIONAL:   negative for fevers, chills, fatigue and malaise    EYES:   negative for double vision, blurred vision and photophobia    HEENT:   negative for tinnitus, epistaxis and sore throat     RESPIRATORY:   negative for cough, shortness of breath, wheezing     CARDIOVASCULAR:   negative for chest pain, palpitations, syncope, edema     GASTROINTESTINAL:   negative for nausea, vomiting     GENITOURINARY:   negative for incontinence     MUSCULOSKELETAL:   negative for neck or back pain     NEUROLOGICAL:   Negative for weakness and tingling  negative for headaches and dizziness     PSYCHIATRIC:   negative for anxiety         OBJECTIVE:   VITALS:  height is 5' 6\" (1.676 m) and weight is 315 lb (142.9 kg) (abnormal). Her temporal temperature is 97.4 °F (36.3 °C). Her blood pressure is 128/84 and her pulse is 76. Her respiration is 20 and oxygen saturation is 100%. CONSTITUTIONAL:alert & cooperative, no acute distress. Very pleasant. SKIN:  Warm and dry, no rashes on exposed areas of skin   HEAD:  Normocephalic, atraumatic   EYES: EOMs intact. EARS:  Hearing grossly WNL. NOSE:  Nares patent. No rhinorrhea.   MOUTH/THROAT:  benign  NECK:supple, no lymphadenopathy. Thick neck. LUNGS: Clear to auscultation bilaterally, no wheezes. CARDIOVASCULAR: Heart sounds are normal.  Regular rate and rhythm without murmur. ABDOMEN: soft, non tender, non distended. Rotund. EXTREMITIES: no edema bilateral lower extremities. Testing:   EK22  Labs pending: drawn 2022   Chest XRay:  22    IMPRESSIONS:   1. Obesity, GERD  2.  has a past medical history of Abnormal Pap smear of cervix (), Anemia complicating pregnancy in second trimester (2015), COVID-19 (2021), Gastroesophageal reflux disease (2019), Morbidly obese ([address] Snores, and Wellness examination. PLANS:   1.  Gastric sleeve to [facility] Blevins PA-C  Electronically signed 2022 at 1:57 PM

## 2022-02-21 NOTE — H&P
History and Physical    Pt Name: Iesha Peguero  MRN: [id number]  YOB: 1990  Date of evaluation: 2022    SUBJECTIVE:   History of Chief Complaint:    Patient presents for PAT appointment. She is s/p sleeve gastrectomy in 2019, says that she has had significant GERD since then. She has also had some weight regain per documentation in Epic. She has been scheduled for sleeve gastrectomy to [facility] bypass. Past Medical History    has a past medical history of Abnormal Pap smear of cervix, Anemia complicating pregnancy in second trimester, COVID-19, Gastroesophageal reflux disease, Morbidly obese ([facility] [address] Snores, and Wellness examination. Past Surgical History   has a past surgical history that includes  section (2015); Gastric Band (2019); Upper gastrointestinal endoscopy (N/A, 2019); and Upper gastrointestinal endoscopy (N/A, 10/08/2021). Medications  Prior to Admission medications    Medication Sig Start Date End Date Taking?  Authorizing Provider   clindamycin (CLEOCIN) 300 MG capsule Take 1 capsule by mouth 2 times daily for 7 days  Patient taking differently: Take 300 mg by mouth 2 times daily Started 2022 Yes PRADIP Roe CNP   ibuprofen (ADVIL;MOTRIN) 800 MG tablet Take 1 tablet by mouth every 8 hours as needed for Pain  Patient taking differently: Take 800 mg by mouth every 8 hours as needed for Pain Stop 1 week prior to sx 21  Yes Luci Cantrell MD   vitamin D (ERGOCALCIFEROL) 1.25 MG (92207 UT) CAPS capsule Take 1 capsule by mouth once a week for 8 doses 10/28/21 2/21/22 Yes PRADIP Hogue CNP   vitamin B-12 (CYANOCOBALAMIN) 500 MCG tablet Take 1 tablet by mouth daily 10/28/21 10/28/22 Yes PRADIP Hogue CNP   folic acid (FOLVITE) 1 MG tablet Take 1 tablet by mouth daily 10/28/21  Yes PRADIP Hogue CNP   pantoprazole (PROTONIX) 40 MG tablet Take 1 tablet by mouth daily  Patient taking differently: Take 40 mg by mouth daily In am 7/23/21  Yes PRADIP Cobb CNP   famotidine (PEPCID) 20 MG tablet Take 1 tablet by mouth nightly 7/23/21  Yes Daparnaldo Villanueva APRN - CNP     Allergies  is allergic to latex. Family History  family history includes Cancer in her maternal aunt, maternal grandfather, maternal uncle, and paternal grandfather; Depression in her mother; Diabetes in her maternal grandfather and maternal uncle; Hypertension in her maternal grandfather; Stroke in her maternal uncle; Thyroid Disease in her maternal grandmother. Social History   reports that she has never smoked. She has never used smokeless tobacco.   reports no history of alcohol use. reports no history of drug use. Marital Status single    Review of Systems:  CONSTITUTIONAL:   negative for fevers, chills, fatigue and malaise    EYES:   negative for double vision, blurred vision and photophobia    HEENT:   negative for tinnitus, epistaxis and sore throat     RESPIRATORY:   negative for cough, shortness of breath, wheezing     CARDIOVASCULAR:   negative for chest pain, palpitations, syncope, edema     GASTROINTESTINAL:   negative for nausea, vomiting     GENITOURINARY:   negative for incontinence     MUSCULOSKELETAL:   negative for neck or back pain     NEUROLOGICAL:   Negative for weakness and tingling  negative for headaches and dizziness     PSYCHIATRIC:   negative for anxiety         OBJECTIVE:   VITALS:  height is 5' 6\" (1.676 m) and weight is 315 lb (142.9 kg) (abnormal). Her temporal temperature is 97.4 °F (36.3 °C). Her blood pressure is 128/84 and her pulse is 76. Her respiration is 20 and oxygen saturation is 100%. CONSTITUTIONAL:alert & cooperative, no acute distress. Very pleasant. SKIN:  Warm and dry, no rashes on exposed areas of skin   HEAD:  Normocephalic, atraumatic   EYES: EOMs intact. EARS:  Hearing grossly WNL. NOSE:  Nares patent. No rhinorrhea.   MOUTH/THROAT:  benign  NECK:supple, no lymphadenopathy. Thick neck. LUNGS: Clear to auscultation bilaterally, no wheezes. CARDIOVASCULAR: Heart sounds are normal.  Regular rate and rhythm without murmur. ABDOMEN: soft, non tender, non distended. Rotund. EXTREMITIES: no edema bilateral lower extremities. Testing:   EK22  Labs pending: drawn 2022   Chest XRay:  22    IMPRESSIONS:   1. Obesity, GERD  2.  has a past medical history of Abnormal Pap smear of cervix (), Anemia complicating pregnancy in second trimester (2015), COVID-19 (2021), Gastroesophageal reflux disease (2019), Morbidly obese ([address] Snores, and Wellness examination. PLANS:   1.  Gastric sleeve to [facility] Enamorado PA-C  Electronically signed 2022 at 1:57 PM

## 2022-02-22 LAB
EKG ATRIAL RATE: 70 BPM
EKG P AXIS: -7 DEGREES
EKG P-R INTERVAL: 122 MS
EKG Q-T INTERVAL: 402 MS
EKG QRS DURATION: 86 MS
EKG QTC CALCULATION (BAZETT): 434 MS
EKG R AXIS: 39 DEGREES
EKG T AXIS: 30 DEGREES
EKG VENTRICULAR RATE: 70 BPM

## 2022-02-24 ENCOUNTER — OFFICE VISIT (OUTPATIENT)
Dept: FAMILY MEDICINE CLINIC | Age: 32
End: 2022-02-24
Payer: MEDICARE

## 2022-02-24 VITALS
WEIGHT: 293 LBS | BODY MASS INDEX: 50.33 KG/M2 | SYSTOLIC BLOOD PRESSURE: 128 MMHG | HEART RATE: 86 BPM | DIASTOLIC BLOOD PRESSURE: 82 MMHG | OXYGEN SATURATION: 98 % | TEMPERATURE: 97.2 F

## 2022-02-24 DIAGNOSIS — E66.01 MORBID OBESITY (HCC): Primary | ICD-10-CM

## 2022-02-24 DIAGNOSIS — Z01.818 PRE-OPERATIVE CLEARANCE: ICD-10-CM

## 2022-02-24 PROCEDURE — 99214 OFFICE O/P EST MOD 30 MIN: CPT | Performed by: NURSE PRACTITIONER

## 2022-02-24 PROCEDURE — G8427 DOCREV CUR MEDS BY ELIG CLIN: HCPCS | Performed by: NURSE PRACTITIONER

## 2022-02-24 PROCEDURE — G8417 CALC BMI ABV UP PARAM F/U: HCPCS | Performed by: NURSE PRACTITIONER

## 2022-02-24 PROCEDURE — G8484 FLU IMMUNIZE NO ADMIN: HCPCS | Performed by: NURSE PRACTITIONER

## 2022-02-24 PROCEDURE — 1036F TOBACCO NON-USER: CPT | Performed by: NURSE PRACTITIONER

## 2022-02-24 ASSESSMENT — ENCOUNTER SYMPTOMS
CHEST TIGHTNESS: 0
DIARRHEA: 0
NAUSEA: 0
COUGH: 0
ALLERGIC/IMMUNOLOGIC NEGATIVE: 1
EYES NEGATIVE: 1
SHORTNESS OF BREATH: 0
WHEEZING: 0
COLOR CHANGE: 0
VOMITING: 0
GASTROINTESTINAL NEGATIVE: 1
RESPIRATORY NEGATIVE: 1

## 2022-02-24 ASSESSMENT — PATIENT HEALTH QUESTIONNAIRE - PHQ9
SUM OF ALL RESPONSES TO PHQ QUESTIONS 1-9: 0
SUM OF ALL RESPONSES TO PHQ9 QUESTIONS 1 & 2: 0
SUM OF ALL RESPONSES TO PHQ QUESTIONS 1-9: 0
SUM OF ALL RESPONSES TO PHQ QUESTIONS 1-9: 0
1. LITTLE INTEREST OR PLEASURE IN DOING THINGS: 0
2. FEELING DOWN, DEPRESSED OR HOPELESS: 0
SUM OF ALL RESPONSES TO PHQ QUESTIONS 1-9: 0

## 2022-02-24 NOTE — LETTER
Judi Chavez, MSN, FNP-C  [facility] [place]  [address]  [facility], [address] [place] [address]  [phone]          2022      RE: Liset WALTERS 1990    Dear Dr. Vineet Portillo was evaluated in my office for pre-operative evaluation. Based on review of information available to me at this time, the patient appears to be medically cleared at this time for surgery and anesthesia. Pre op labs & EKG have been reviewed     If you have any questions, please feel free to contact me.     Sincerely,        Judi Chavez, DICK, FNP-C

## 2022-02-24 NOTE — PROGRESS NOTES
[address]  Dept: [phone]    Jeffory Hamman is a 27 y.o. female who presents for a preoperative physical examination. She is scheduled to have gastric bypass done by Dr. Gus Hill at [facility]. V's on 3/7/2022     Any recent illnesses that will interfere with the upcoming surgery? None  Last surgical procedure -   Type of anesthesia used - general   Problems with the procedure or anesthesia? None known     Conditioning (equivalent to 4 METs) --  1. Can you walk up 2 flights of stairs? Yes  2. Can you run a 'short distance'? Yes  3. Can you walk up a hill 1-2 blocks? Yes  4. Can you alicia 2 bags of groceries up 1 flight of stairs? Yes  5. Can you walk 2-4 blocks (flat)? Yes     Past Medical History:   Diagnosis Date    Abnormal Pap smear of cervix     Anemia complicating pregnancy in second trimester 2015    COVID-19 2021 night sweats, cough, sore throat x 1 week    Gastroesophageal reflux disease 2019    on rx    Morbidly obese ([address]     Snores     no sleep apnea    Wellness examination     [facility] APRN- [address]  last seen   . Seeing 22 for clearance.        Past Surgical History:   Procedure Laterality Date     SECTION  2015    GASTRIC BAND  2019    GASTRIC SLEEVE    UPPER GASTROINTESTINAL ENDOSCOPY N/A 2019    EGD BIOPSY performed by Zenon Shin MD at [address] N/A 10/08/2021    EGD BIOPSY performed by Felipe Dwyer DO at [address] [facility].       Family History   Problem Relation Age of Onset    Cancer Paternal Grandfather         unknown- Lung     Thyroid Disease Maternal Grandmother     Diabetes Maternal Grandfather     Cancer Maternal Grandfather         throat    Hypertension Maternal Grandfather     Depression Mother     Cancer Maternal Aunt         stomach    Cancer Maternal Uncle prostate    Stroke Maternal Uncle     Diabetes Maternal Uncle        Social History     Tobacco Use    Smoking status: Never Smoker    Smokeless tobacco: Never Used   Substance Use Topics    Alcohol use: No      Current Outpatient Medications   Medication Sig Dispense Refill    vitamin B-12 (CYANOCOBALAMIN) 500 MCG tablet Take 1 tablet by mouth daily 30 tablet 11    folic acid (FOLVITE) 1 MG tablet Take 1 tablet by mouth daily 30 tablet 2    pantoprazole (PROTONIX) 40 MG tablet Take 1 tablet by mouth daily (Patient taking differently: Take 40 mg by mouth daily In am) 30 tablet 3    famotidine (PEPCID) 20 MG tablet Take 1 tablet by mouth nightly 30 tablet 3    vitamin D (ERGOCALCIFEROL) 1.25 MG (37007 UT) CAPS capsule Take 1 capsule by mouth once a week for 8 doses 8 capsule 0     No current facility-administered medications for this visit. Allergies   Allergen Reactions    Latex Dermatitis       :     HPI    Presents today c/o pre op clearance for gastric bypass   PMH significant for GERD , obesity & abnormal PAP smear   No known history of cardiac or pulmonary disease  Declines current fever/chills, cough, dyspnea, chest pain, palpitations, dizziness or syncope       :     Review of Systems   Constitutional: Negative. Negative for appetite change, chills, diaphoresis, fatigue, fever and unexpected weight change. HENT: Negative. Eyes: Negative. Respiratory: Negative. Negative for cough, chest tightness, shortness of breath and wheezing. Cardiovascular: Negative. Negative for chest pain, palpitations and leg swelling. Gastrointestinal: Negative. Negative for diarrhea, nausea and vomiting. Endocrine: Negative. Genitourinary: Negative. Negative for difficulty urinating and dysuria. Musculoskeletal: Negative. Skin: Negative. Negative for color change, pallor, rash and wound. Allergic/Immunologic: Negative. Neurological: Negative.   Negative for dizziness, seizures, syncope, light-headedness and headaches. Hematological: Negative. Psychiatric/Behavioral: Negative.        :     Vitals:    02/24/22 0942   BP: 128/82   Pulse: 86   Temp: 97.2 °F (36.2 °C)   SpO2: 98%       Body mass index is 50.33 kg/m². Physical Exam  Constitutional:       General: She is not in acute distress. Appearance: Normal appearance. She is well-developed. She is obese. She is not ill-appearing, toxic-appearing or diaphoretic. HENT:      Head: Normocephalic and atraumatic. Right Ear: External ear normal.      Left Ear: External ear normal.      Nose: Nose normal.   Eyes:      General: No scleral icterus. Right eye: No discharge. Left eye: No discharge. Conjunctiva/sclera: Conjunctivae normal.      Pupils: Pupils are equal, round, and reactive to light. Neck:      Trachea: No tracheal deviation. Cardiovascular:      Rate and Rhythm: Normal rate and regular rhythm. Heart sounds: Normal heart sounds. No murmur heard. No friction rub. No gallop. Pulmonary:      Effort: Pulmonary effort is normal. No tachypnea, accessory muscle usage or respiratory distress. Breath sounds: Normal breath sounds. No stridor. No decreased breath sounds, wheezing, rhonchi or rales. Abdominal:      Palpations: Abdomen is soft. Musculoskeletal:         General: No tenderness or deformity. Normal range of motion. Cervical back: Normal range of motion and neck supple. Skin:     General: Skin is warm and dry. Coloration: Skin is not pale. Findings: No erythema or rash. Neurological:      Mental Status: She is alert and oriented to person, place, and time. GCS: GCS eye subscore is 4. GCS verbal subscore is 5. GCS motor subscore is 6. Gait: Gait is intact. Gait normal.   Psychiatric:         Speech: Speech normal.         Behavior: Behavior normal.         Thought Content:  Thought content normal.         Judgment: Judgment normal.           : 1. Morbid obesity ([address]    2. Pre-operative clearance        :     1. Morbid obesity ([address]      2. Pre-operative clearance    Revised Cardiac Risk Index (RCRI)-(one point for each)    . Diabetes requiring insulin  2. Risky surgery (intrathoracic, intraperitoneal,vascular)  3.Cad (including + test, EKG, etc)  4. Chf  5. Cva hx  6. Cr> 2.0    Score - for cardiac mortality  0 = 0.4%  1 = 1.0%  2 = 2.4-6.6%  or more = 5.4-11%    RCRI score is 0  0 -> go to OR  1-2 -surgery with beta blocker if itwill change mgmt &/or consider non invasive testing  >:   vascular surgery- consider further testing & beta blocker  Intermediaterisk surgery - to OR with possible beta blocker or consider noninvasive testing       Labs & EKG have been reviewed in conjunction with BM  EKG showed NSR/sinus arrhythmia with rate of 70, no abnormalities   Labs stable     She appears to be medically cleared at this time for surgery and anesthesia.       Electronically signed by PRADIP Bliss CNP on 2/24/2022

## 2022-02-25 ENCOUNTER — OFFICE VISIT (OUTPATIENT)
Dept: BARIATRICS/WEIGHT MGMT | Age: 32
End: 2022-02-25
Payer: MEDICARE

## 2022-02-25 VITALS
WEIGHT: 293 LBS | BODY MASS INDEX: 47.09 KG/M2 | RESPIRATION RATE: 18 BRPM | HEIGHT: 66 IN | HEART RATE: 82 BPM | SYSTOLIC BLOOD PRESSURE: 116 MMHG | DIASTOLIC BLOOD PRESSURE: 72 MMHG

## 2022-02-25 DIAGNOSIS — E66.01 MORBID OBESITY (HCC): ICD-10-CM

## 2022-02-25 DIAGNOSIS — Z90.3 HISTORY OF SLEEVE GASTRECTOMY: ICD-10-CM

## 2022-02-25 DIAGNOSIS — K21.9 GASTROESOPHAGEAL REFLUX DISEASE WITHOUT ESOPHAGITIS: Primary | ICD-10-CM

## 2022-02-25 LAB
3-OH-COTININE: <2 NG/ML
COTININE: <2 NG/ML
NICOTINE: <2 NG/ML

## 2022-02-25 PROCEDURE — 1036F TOBACCO NON-USER: CPT | Performed by: SURGERY

## 2022-02-25 PROCEDURE — 99212 OFFICE O/P EST SF 10 MIN: CPT | Performed by: SURGERY

## 2022-02-25 PROCEDURE — G8417 CALC BMI ABV UP PARAM F/U: HCPCS | Performed by: SURGERY

## 2022-02-25 PROCEDURE — G8484 FLU IMMUNIZE NO ADMIN: HCPCS | Performed by: SURGERY

## 2022-02-25 PROCEDURE — G8427 DOCREV CUR MEDS BY ELIG CLIN: HCPCS | Performed by: SURGERY

## 2022-03-04 ENCOUNTER — HOSPITAL ENCOUNTER (OUTPATIENT)
Age: 32
Setting detail: SPECIMEN
Discharge: HOME | End: 2022-03-04

## 2022-03-04 ENCOUNTER — OFFICE VISIT (OUTPATIENT)
Dept: OBGYN CLINIC | Age: 32
End: 2022-03-04
Payer: MEDICARE

## 2022-03-04 VITALS
WEIGHT: 293 LBS | DIASTOLIC BLOOD PRESSURE: 86 MMHG | SYSTOLIC BLOOD PRESSURE: 124 MMHG | HEIGHT: 66 IN | BODY MASS INDEX: 47.09 KG/M2

## 2022-03-04 DIAGNOSIS — N89.8 VAGINAL DISCHARGE: ICD-10-CM

## 2022-03-04 DIAGNOSIS — Z01.419 VISIT FOR GYNECOLOGIC EXAMINATION: Primary | ICD-10-CM

## 2022-03-04 DIAGNOSIS — Z11.51 SCREENING FOR HUMAN PAPILLOMAVIRUS (HPV): ICD-10-CM

## 2022-03-04 PROCEDURE — 99395 PREV VISIT EST AGE 18-39: CPT | Performed by: NURSE PRACTITIONER

## 2022-03-04 PROCEDURE — G8484 FLU IMMUNIZE NO ADMIN: HCPCS | Performed by: NURSE PRACTITIONER

## 2022-03-04 ASSESSMENT — PATIENT HEALTH QUESTIONNAIRE - PHQ9
SUM OF ALL RESPONSES TO PHQ9 QUESTIONS 1 & 2: 0
SUM OF ALL RESPONSES TO PHQ QUESTIONS 1-9: 0
SUM OF ALL RESPONSES TO PHQ QUESTIONS 1-9: 0
2. FEELING DOWN, DEPRESSED OR HOPELESS: 0
1. LITTLE INTEREST OR PLEASURE IN DOING THINGS: 0
SUM OF ALL RESPONSES TO PHQ QUESTIONS 1-9: 0
SUM OF ALL RESPONSES TO PHQ QUESTIONS 1-9: 0

## 2022-03-04 NOTE — PROGRESS NOTES
[address] [facility] MIN INVASIVE BARIATRIC SURG  [address]  [address]  [facility] [address]  Dept: [phone]    SURGICAL WEIGHT MANAGEMENT PROGRAM   PROGRESS NOTE - SURGICAL EVALUATION    CC: Weight Loss       Patient: Eddie Shaw        Service Date: 2/25/2022       Medical Record #: [id number]    Patient History/Assessment Summary:    The patient is a pleasant 27y.o. year old female  with morbid obesity, who stands Height: 5' 6\" (167.6 cm) tall with a weight of Weight: (!) 307 lb (139.3 kg) , resulting in a BMI of Body mass index is 49.55 kg/m². .     This patient is alone for the evaluation today. The patient is being evaluated to undergo weight loss surgery to treat the following comorbid conditions caused by her morbid obesity: GERD    She attended the weight loss surgery seminar, and attended bariatric education    Last Visit Weight:   Wt Readings from Last 3 Encounters:   02/21/22 (!) 315 lb (142.9 kg)   02/25/22 (!) 307 lb (139.3 kg)   02/24/22 (!) 311 lb 12.8 oz (141.4 kg)     Today's weight is decreased from the last visit. Highest Weight:     The patient is being evaluated for Laparoscopic Jesus-en-Y Gastric Bypass and Laparoscopic Revisional Gastric Surgery. She  is here today to review the details of surgery. The patient acknowledges and understands the risks, benefits, and options we have discussed, as outlined in the Additional Informed Consent for this procedure. Patient also understands the importance of surgical and post-operative recommendations, including the operative diet and regular post-operative follow up care. The importance of ambulation and incentive spirometry was also discussed. All questions of this patient and any family members present have been answered to their satisfaction.     Physical Examination:     /72 (Site: Right Upper Arm, Position: Sitting, Cuff Size: Large Adult)   Pulse 82   Resp 18   Ht 5' 6\" (1.676 m)   Wt (!) 307 lb (139.3 kg)   BMI 49.55 kg/m²   General This patient is awake, alert, and oriented, and is in no apparent distress. Cardiac Regular rate and rhythm without evidence of murmur. Respiratory Clear to auscultation bilaterally. Abdomen Obese, soft, non-tender, non-distended without masses/ No  evidence of abdominal hernia / Incisions consistent with previous surgeries. Head and Neck Obese, normocephalic and atraumatic/soft and supple, no  lymphadenopathy or obvious bruits. Extremeties No cyanosis, clubbing or edema/ No calf tenderness/No  restrictions of movement, is ambulatory without assistance. Neurological Intact x 4 extremities, no focal deficits noted   Skin No rashes or lesions noted   Rectal Deferred     RECOMMENDATIONS:       Diagnosis Orders   1. Gastroesophageal reflux disease without esophagitis     2. History of sleeve gastrectomy     3. Morbid obesity ([address]            We spent a great deal of time discussing the risks and benefits of Laparoscopic Jesus-en-Y Gastric Bypass and Laparoscopic Revisional Gastric Surgery, including but not limited to injury to intra-abdominal organs, breakdown of the gastric staple line, the need for re-operative therapy,  prolonged hospitalization,  mechanical ventilation,  and death. We discussed the possibility of bleeding, the need for blood transfusions, blood clots, hospital-acquired and intra-abdominal infection, anastomotic stricture, and worsening GERD. And we discussed the need for post-operative visit compliance, behavior modifications and diet changes, protein and vitamin supplementation, as well as routine scheduled and dedicated exercise. We discussed the potential weight loss benefit of approximately 60-70% of her excess body weight at 12-18 months post-op, as well as the possibility of insufficient weight loss or weight gain after 2 years post-operative time.      The following was discussed with the patient:    DVT Prophylaxis    GERD  We discussed options for GERD. Hiatal hernia repair, medical treatment or bypass. She would like to move forward with bypass    Morbid Obesity, BMI 49  Need for long term diet and exercise to promote sustained weight loss discussed  Option of medical weight loss discussed  She would like to move forward with surgery  She expressed understanding of the higher risks of revision surgery    Electronically signed by Amita El DO on 3/3/2022 at 9:05 PM    Please note that this chart was generated using voice recognition Dragon dictation software. Although every effort was made to ensure the accuracy of this automated transcription, some errors in transcription may have occurred.

## 2022-03-04 NOTE — PROGRESS NOTES
History and Physical  [address], [address] [facility] [address], [address]. [phone]   Fax [phone]  Dimas Nice  3/4/2022              27 y.o. Chief Complaint   Patient presents with    Follow-up       No LMP recorded. Patient has had an implant. Primary Care Physician: Flori Edgar, APRN - CNP    The patient was seen and examined. She has no chief complaint today and is here for her annual exam.  Her bowels are regular. There are no voiding complaints. She denies any bloating. She denies vaginal discharge and was counseled on STD's and the need for barrier contraception. HPI : Dimas Nice is a 27 y.o. female [id number]    Annual exam  Complaining of vaginal discharge and odor. New partner last 2-3 months. Hx of frequent BV infections. Mirena in place X 5 years- placed in [place]. Planned gastric surgery scheduled on Monday.    ________________________________________________________________________  OB History    Para Term  AB Living   3 3 3 0 0 3   SAB IAB Ectopic Molar Multiple Live Births   0 0 0 0 0 3      # Outcome Date GA Lbr Jonathon/2nd Weight Sex Delivery Anes PTL Lv   3 Term 17 39w0d  8 lb 10 oz (3.912 kg) F CS-LTranv  N ADRIA   2 Term 07/20/15 39w4d  9 lb 3.4 oz (4.179 kg) M CS-LTranv Spinal N ADRIA      Apgar1: 5  Apgar5: 8   1 Term 04 40w0d  7 lb 8 oz (3.402 kg) M Vag-Spont   ADRIA     Past Medical History:   Diagnosis Date    Abnormal Pap smear of cervix     Anemia complicating pregnancy in second trimester 2015    COVID-19 2021 night sweats, cough, sore throat x 1 week    Gastroesophageal reflux disease 2019    on rx    Morbidly obese ([address]     Snores     no sleep apnea    Wellness examination     Monicabel Nicole APRN- [address]  last seen   . Seeing 22 for clearance.                                                                     Past Surgical History:   Procedure Laterality Date     SECTION  2015    GASTRIC BAND  2019    GASTRIC SLEEVE    UPPER GASTROINTESTINAL ENDOSCOPY N/A 2019    EGD BIOPSY performed by Ester Meza MD at [facility] N/A 10/08/2021    [facility] [address] [facility]     Family History   Problem Relation Age of Onset    Cancer Paternal Grandfather         unknown- Lung     Thyroid Disease Maternal Grandmother     Diabetes Maternal Grandfather     Cancer Maternal Grandfather         throat    Hypertension Maternal Grandfather     Depression Mother     Cancer Maternal Aunt         stomach    Cancer Maternal Uncle         prostate    Stroke Maternal Uncle     Diabetes Maternal Uncle      Social History     Socioeconomic History    Marital status: Single     Spouse name: Not on file    Number of children: Not on file    Years of education: Not on file    Highest education level: Not on file   Occupational History    Not on file   Tobacco Use    Smoking status: Never Smoker    Smokeless tobacco: Never Used   Vaping Use    Vaping Use: Never used   Substance and Sexual Activity    Alcohol use: No    Drug use: No    Sexual activity: Yes     Partners: Male     Birth control/protection: I.U.D. Other Topics Concern    Not on file   Social History Narrative    Not on file     Social Determinants of Health     Financial Resource Strain: High Risk    Difficulty of Paying Living Expenses: Very hard   Food Insecurity: No Food Insecurity    Worried About Running Out of Food in the Last Year: Never true    Amber of Food in the Last Year: Never true   Transportation Needs:     Lack of Transportation (Medical): Not on file    Lack of Transportation (Non-Medical):  Not on file   Physical Activity:     Days of Exercise per Week: Not on file    Minutes of Exercise per Session: Not on file   Stress:     Feeling of Stress : Not on file   Social Connections:     Frequency of Communication with Friends and Family: Not on file    Frequency of Social Gatherings with Friends and Family: Not on file    Attends Worship Services: Not on file    Active Member of Clubs or Organizations: Not on file    Attends Club or Organization Meetings: Not on file    Marital Status: Not on file   Intimate Partner Violence:     Fear of Current or Ex-Partner: Not on file    Emotionally Abused: Not on file    Physically Abused: Not on file    Sexually Abused: Not on file   Housing Stability:     Unable to Pay for Housing in the Last Year: Not on file    Number of [place] in the Last Year: Not on file    Unstable Housing in the Last Year: Not on file       MEDICATIONS:  Current Outpatient Medications   Medication Sig Dispense Refill    vitamin B-12 (CYANOCOBALAMIN) 500 MCG tablet Take 1 tablet by mouth daily 30 tablet 11    folic acid (FOLVITE) 1 MG tablet Take 1 tablet by mouth daily 30 tablet 2    pantoprazole (PROTONIX) 40 MG tablet Take 1 tablet by mouth daily (Patient taking differently: Take 40 mg by mouth daily In am) 30 tablet 3    famotidine (PEPCID) 20 MG tablet Take 1 tablet by mouth nightly 30 tablet 3     No current facility-administered medications for this visit. ALLERGIES:  Allergies as of 03/04/2022 - Fully Reviewed 03/04/2022   Allergen Reaction Noted    Latex Dermatitis 07/19/2015       Symptoms of decreased mood absent  Symptoms of anhedonia absent    **If either question is answered in a  positive fashion then complete the PHQ9 Scoring Evaluation and make the appropriate referral**      Immunization status: stated as current, but no records available. Gynecologic History:  Menarche: 14 yo  Menopause at [age] yo     No LMP recorded. Patient has had an implant.     Sexually Active: Yes    STD History: Yes herpes      Permanent Sterilization: No   Reversible Birth Control: yes, IUD in place       Hormone Replacement Exposure: No      Genetic Qualified Family History of Breast, Ovarian , Colon or Uterine Cancer: No     If YES see scanned worksheet. Preventative Health Testing:    Health Maintenance:  Health Maintenance Due   Topic Date Due    Hepatitis C screen  Never done    Varicella vaccine (1 of 2 - 2-dose childhood series) Never done    Flu vaccine (1) 09/01/2021       Date of Last Pap Smear: 12/9/2020 unsatisfactory  Abnormal Pap Smear History: denies  Colposcopy History:   Date of Last Mammogram: NA  Date of Last Colonoscopy:   Date of Last Bone Density:      ________________________________________________________________________        REVIEW OF SYSTEMS:    yes   A minimum of an eleven point review of systems was completed. Review Of Systems (11 point):  Constitutional: No fever, chills or malaise; No weight change or fatigue  Head and Eyes: No vision changes, Headache, Dizziness or trauma in last 12 months  ENT ROS: No hearing, Tinnitis, sinus or taste problems  Hematological and Lymphatic ROS:No Lymphoma, Von Willebrand's, Hemophillia or Bleeding History  Psych ROS: No Depression, Homicidal thoughts,suicidal thoughts, or anxiety  Breast ROS: No breast abnormalities or lumps  Respiratory ROS: No SOB, Pneumoniae,Cough, or Pulmonary Embolism   Cardiovascular ROS: No Chest Pain with Exertion, Palpitations, Syncope, Edema, Arrhythmia  Gastrointestinal ROS: No Indigestion, Heartburn, Nausea, vomiting, Diarrhea, Constipation,or Bowel Changes; No Bloody Stools or melena  Genito-Urinary ROS: No Dysuria, Hematuria or Nocturia.  No Urinary Incontinence or Vaginal Discharge  Musculoskeletal ROS: No Arthralgia, Arthritis,Gout,Osteoporosis or Rheumatism  Neurological ROS: No CVA, Migraines, Epilepsy, Seizure Hx, or Limb Weakness  Dermatological ROS: No Rash, Itching, Hives, Mole Changes or Cancer PHYSICAL Exam:     Constitutional:  Vitals:    03/04/22 1049   BP: 124/86   Site: Right Lower Arm   Position: Sitting   Cuff Size: Medium Adult   Weight: (!) 305 lb (138.3 kg)   Height: 5' 6\" (1.676 m)       Chaperone for Intimate Exam   Chaperone was offered and accepted as part of the rooming process.  Chaperone: Adrien Armando          General Appearance: This  is a well Developed, well Nourished, well groomed female. Her BMI was reviewed. Nutritional decision making was discussed. Skin:  There was a Normal Inspection of the skin without rashes or lesions. There were no rashes. (Papular, Maculopapular, Hives, Pustular, Macular)     There were no lesions (Ulcers, Erythema, Abn. Appearing Nevi)            Lymphatic:  No Lymph Nodes were Palpable in the neck , axilla or groin.  0 # Of Lymph Nodes; Location ; Character [Normal]  [Shotty] [Tender] [Enlarged]     Neck and EENT:  The neck was supple. There were no masses   The thyroid was not enlarged and had no masses. Perrla, EOMI B/L, TMI B/L No Abnormalities. Throat inspected-No exudates or Masses, Nares Patent No Masses        Respiratory: The lungs were auscultated and found to be clear. There were no rales, rhonchi or wheezes. There was a good respiratory effort. Cardiovascular: The heart was in a regular rate and rhythm. . No S3 or S4. There was no murmur appreciated. Location, grade, and radiation are not applicable. Extremities: The patients extremities were without calf tenderness, edema, or varicosities. There was full range of motion in all four extremities. Pulses in all four extremities were appreciated and are 2/4. Abdomen: The abdomen was soft and non-tender. There were good bowel sounds in all quadrants and there was no guarding, rebound or rigidity.   On evaluation there was no evidence of level 10/29/2021    Low folate 10/29/2021    Vitamin D deficiency 10/29/2021    History of sleeve gastrectomy 07/23/2021    Morbid obesity ([facility] [address]     Status post gastric surgery     S/P bariatric surgery 09/05/2019    Hiatal hernia with GERD and esophagitis 09/05/2019    H/O LGSIL (1/29/15) 09/28/2015     Negative colpo on 4/7/15            Hereditary Breast, Ovarian, Colon and Uterine Cancer screening Done. Tobacco & Secondary smoke risks reviewed; instructed on cessation and avoidance      Counseling Completed:  Preventative Health Recommendations and Follow up. The patient was informed of the recommended preventative health recommendations. 1. Annuals every year; Cytology collections per prevailing guidelines. 2. Mammograms begin every year at 37 yo if no abnormalities are found and no family history. 3. Bone density studies every 2-3 years. Begin at 73 yo. If no fracture history or osteoporosis family history. (significant). 4. Colonoscopy begin at 40 yo. Repeat every ten years if negative and no family history. 5. Calcium of 3752-8971 mg/day in split dosing  6. Vitamin D 400-800 IU/day  7. All other preventative health recommendations will be managed by the patients Primary care physician. PLAN:  Return in about 1 year (around 3/4/2023) for annual.   Pap smear and vaginal cultures collected. Repeat Annual every 1 year  Cervical Cytology Evaluation begins at 24years old. If Negative Cytology, Follow-up screening per current guidelines. Mammograms every 1 year. If 37 yo and last mammogram was negative. Calcium and Vitamin D dosing reviewed. Colonoscopy screening reviewed as well as onset for bone density testing. Birth control and barrier recommendations discussed. STD counseling and prevention reviewed. Gardisil counseling completed for all patients 10-37 yo. Routine health maintenance per patients PCP.   Orders Placed This Encounter   Procedures    Vaginitis DNA Probe     Standing Status:   Future     Standing Expiration Date:   3/4/2023    C.trachomatis N.gonorrhoeae DNA     Standing Status:   Future     Standing Expiration Date:   2022    PAP Smear     Patient History:    No LMP recorded. Patient has had an implant. OBGYN Status: Implant  Past Surgical History:  2015:  SECTION  2019: GASTRIC BAND      Comment:  GASTRIC SLEEVE  2019: UPPER GASTROINTESTINAL ENDOSCOPY; N/A      Comment:  EGD BIOPSY performed by Saul Cardona MD at [facility]  10/08/2021: UPPER GASTROINTESTINAL ENDOSCOPY; N/A      Comment:  EGD BIOPSY performed by Chris Hanks DO at [address] History    Tobacco Use      Smoking status: Never Smoker      Smokeless tobacco: Never Used       Standing Status:   Future     Standing Expiration Date:   3/2/2023     Order Specific Question:   Collection Type     Answer: Thin Prep     Order Specific Question:   Prior Abnormal Pap Test     Answer:   No     Order Specific Question:   Screening or Diagnostic     Answer:   Screening     Order Specific Question:   HPV Requested? Answer:   Yes     Order Specific Question:   High Risk Patient     Answer:   N/A           The patient, Caroline Horner is a 27 y.o. female, was seen with a total time spent of 20 minutes for the visit on this date of service by the E/M provider. The time component had both face to face and non face to face time spent in determining the total time component. Counseling and education regarding her diagnosis listed below and her options regarding those diagnoses were also included in determining her time component. Diagnosis Orders   1. Visit for gynecologic examination  PAP Smear   2. Screening for human papillomavirus (HPV)  PAP Smear   3.  Vaginal discharge  Vaginitis DNA Probe    C.trachomatis N.gonorrhoeae DNA        The patient had her preventative health maintenance recommendations and follow-up reviewed with her at the completion of her visit.

## 2022-03-07 ENCOUNTER — ANESTHESIA EVENT (OUTPATIENT)
Dept: OPERATING ROOM | Age: 32
DRG: 403 | End: 2022-03-07
Payer: MEDICARE

## 2022-03-07 ENCOUNTER — HOSPITAL ENCOUNTER (INPATIENT)
Age: 32
LOS: 2 days | Discharge: HOME | DRG: 403 | End: 2022-03-09
Attending: SURGERY | Admitting: SURGERY
Payer: MEDICARE

## 2022-03-07 ENCOUNTER — ANESTHESIA (OUTPATIENT)
Dept: OPERATING ROOM | Age: 32
DRG: 403 | End: 2022-03-07
Payer: MEDICARE

## 2022-03-07 VITALS — OXYGEN SATURATION: 98 % | DIASTOLIC BLOOD PRESSURE: 90 MMHG | SYSTOLIC BLOOD PRESSURE: 142 MMHG | TEMPERATURE: 95.9 F

## 2022-03-07 DIAGNOSIS — Z98.84 S/P BARIATRIC SURGERY: Primary | ICD-10-CM

## 2022-03-07 LAB
ABSOLUTE EOS #: <0.03 K/UL (ref 0–0.44)
ABSOLUTE IMMATURE GRANULOCYTE: 0.12 K/UL (ref 0–0.3)
ABSOLUTE LYMPH #: 0.81 K/UL (ref 1.1–3.7)
ABSOLUTE MONO #: 0.45 K/UL (ref 0.1–1.2)
ANION GAP SERPL CALCULATED.3IONS-SCNC: 16 MMOL/L (ref 9–17)
ANION GAP SERPL CALCULATED.3IONS-SCNC: 16 MMOL/L (ref 9–17)
BASOPHILS # BLD: 0 % (ref 0–2)
BASOPHILS ABSOLUTE: <0.03 K/UL (ref 0–0.2)
BUN BLDV-MCNC: 11 MG/DL (ref 6–20)
BUN BLDV-MCNC: 12 MG/DL (ref 6–20)
C TRACH DNA GENITAL QL NAA+PROBE: NEGATIVE
CALCIUM SERPL-MCNC: 8.6 MG/DL (ref 8.6–10.4)
CALCIUM SERPL-MCNC: 8.9 MG/DL (ref 8.6–10.4)
CHLORIDE BLD-SCNC: 101 MMOL/L (ref 98–107)
CHLORIDE BLD-SCNC: 99 MMOL/L (ref 98–107)
CO2: 18 MMOL/L (ref 20–31)
CO2: 18 MMOL/L (ref 20–31)
CREAT SERPL-MCNC: 0.7 MG/DL (ref 0.5–0.9)
CREAT SERPL-MCNC: 0.83 MG/DL (ref 0.5–0.9)
EOSINOPHILS RELATIVE PERCENT: 0 % (ref 1–4)
GFR AFRICAN AMERICAN: >60 ML/MIN
GFR AFRICAN AMERICAN: >60 ML/MIN
GFR NON-AFRICAN AMERICAN: >60 ML/MIN
GFR NON-AFRICAN AMERICAN: >60 ML/MIN
GFR SERPL CREATININE-BSD FRML MDRD: ABNORMAL ML/MIN/{1.73_M2}
GFR SERPL CREATININE-BSD FRML MDRD: ABNORMAL ML/MIN/{1.73_M2}
GLUCOSE BLD-MCNC: 137 MG/DL (ref 70–99)
GLUCOSE BLD-MCNC: 139 MG/DL (ref 70–99)
HCG QUALITATIVE: NEGATIVE
HCT VFR BLD CALC: 37.2 % (ref 36.3–47.1)
HCT VFR BLD CALC: 38.7 % (ref 36.3–47.1)
HEMOGLOBIN: 11.4 G/DL (ref 11.9–15.1)
HEMOGLOBIN: 11.8 G/DL (ref 11.9–15.1)
IMMATURE GRANULOCYTES: 1 %
LYMPHOCYTES # BLD: 5 % (ref 24–43)
MCH RBC QN AUTO: 26.8 PG (ref 25.2–33.5)
MCH RBC QN AUTO: 26.8 PG (ref 25.2–33.5)
MCHC RBC AUTO-ENTMCNC: 30.5 G/DL (ref 28.4–34.8)
MCHC RBC AUTO-ENTMCNC: 30.6 G/DL (ref 28.4–34.8)
MCV RBC AUTO: 87.3 FL (ref 82.6–102.9)
MCV RBC AUTO: 88 FL (ref 82.6–102.9)
MONOCYTES # BLD: 3 % (ref 3–12)
N. GONORRHOEAE DNA: NEGATIVE
NRBC AUTOMATED: 0 PER 100 WBC
NRBC AUTOMATED: 0 PER 100 WBC
PDW BLD-RTO: 14 % (ref 11.8–14.4)
PDW BLD-RTO: 14.1 % (ref 11.8–14.4)
PLATELET # BLD: 286 K/UL (ref 138–453)
PLATELET # BLD: 335 K/UL (ref 138–453)
PMV BLD AUTO: 10.1 FL (ref 8.1–13.5)
PMV BLD AUTO: 9.9 FL (ref 8.1–13.5)
POTASSIUM SERPL-SCNC: 3.6 MMOL/L (ref 3.7–5.3)
POTASSIUM SERPL-SCNC: 4.2 MMOL/L (ref 3.7–5.3)
RBC # BLD: 4.26 M/UL (ref 3.95–5.11)
RBC # BLD: 4.4 M/UL (ref 3.95–5.11)
SEG NEUTROPHILS: 91 % (ref 36–65)
SEGMENTED NEUTROPHILS ABSOLUTE COUNT: 15.09 K/UL (ref 1.5–8.1)
SODIUM BLD-SCNC: 133 MMOL/L (ref 135–144)
SODIUM BLD-SCNC: 135 MMOL/L (ref 135–144)
SPECIMEN DESCRIPTION: NORMAL
WBC # BLD: 12.7 K/UL (ref 3.5–11.3)
WBC # BLD: 16.5 K/UL (ref 3.5–11.3)

## 2022-03-07 PROCEDURE — 0WQF4ZZ REPAIR ABDOMINAL WALL, PERCUTANEOUS ENDOSCOPIC APPROACH: ICD-10-PCS | Performed by: SURGERY

## 2022-03-07 PROCEDURE — 80048 BASIC METABOLIC PNL TOTAL CA: CPT

## 2022-03-07 PROCEDURE — 3600000019 HC SURGERY ROBOT ADDTL 15MIN: Performed by: SURGERY

## 2022-03-07 PROCEDURE — 2700000000 HC OXYGEN THERAPY PER DAY

## 2022-03-07 PROCEDURE — S2900 ROBOTIC SURGICAL SYSTEM: HCPCS | Performed by: SURGERY

## 2022-03-07 PROCEDURE — 7100000001 HC PACU RECOVERY - ADDTL 15 MIN: Performed by: SURGERY

## 2022-03-07 PROCEDURE — 49652 PR LAP, VENTRAL HERNIA REPAIR,REDUCIBLE: CPT | Performed by: SURGERY

## 2022-03-07 PROCEDURE — 2500000003 HC RX 250 WO HCPCS: Performed by: STUDENT IN AN ORGANIZED HEALTH CARE EDUCATION/TRAINING PROGRAM

## 2022-03-07 PROCEDURE — 6370000000 HC RX 637 (ALT 250 FOR IP): Performed by: STUDENT IN AN ORGANIZED HEALTH CARE EDUCATION/TRAINING PROGRAM

## 2022-03-07 PROCEDURE — 8E0W4CZ ROBOTIC ASSISTED PROCEDURE OF TRUNK REGION, PERCUTANEOUS ENDOSCOPIC APPROACH: ICD-10-PCS | Performed by: SURGERY

## 2022-03-07 PROCEDURE — 6360000002 HC RX W HCPCS: Performed by: SURGERY

## 2022-03-07 PROCEDURE — 43281 LAP PARAESOPHAG HERN REPAIR: CPT | Performed by: SURGERY

## 2022-03-07 PROCEDURE — 94761 N-INVAS EAR/PLS OXIMETRY MLT: CPT

## 2022-03-07 PROCEDURE — 2720000010 HC SURG SUPPLY STERILE: Performed by: SURGERY

## 2022-03-07 PROCEDURE — 0D164ZA BYPASS STOMACH TO JEJUNUM, PERCUTANEOUS ENDOSCOPIC APPROACH: ICD-10-PCS | Performed by: SURGERY

## 2022-03-07 PROCEDURE — 85025 COMPLETE CBC W/AUTO DIFF WBC: CPT

## 2022-03-07 PROCEDURE — 3700000001 HC ADD 15 MINUTES (ANESTHESIA): Performed by: SURGERY

## 2022-03-07 PROCEDURE — 43644 LAP GASTRIC BYPASS/ROUX-EN-Y: CPT | Performed by: SURGERY

## 2022-03-07 PROCEDURE — 6360000002 HC RX W HCPCS: Performed by: ANESTHESIOLOGY

## 2022-03-07 PROCEDURE — 6360000002 HC RX W HCPCS: Performed by: NURSE ANESTHETIST, CERTIFIED REGISTERED

## 2022-03-07 PROCEDURE — 85027 COMPLETE CBC AUTOMATED: CPT

## 2022-03-07 PROCEDURE — 84703 CHORIONIC GONADOTROPIN ASSAY: CPT

## 2022-03-07 PROCEDURE — 2500000003 HC RX 250 WO HCPCS: Performed by: SURGERY

## 2022-03-07 PROCEDURE — 1200000000 HC SEMI PRIVATE

## 2022-03-07 PROCEDURE — 2500000003 HC RX 250 WO HCPCS: Performed by: NURSE ANESTHETIST, CERTIFIED REGISTERED

## 2022-03-07 PROCEDURE — 3700000000 HC ANESTHESIA ATTENDED CARE: Performed by: SURGERY

## 2022-03-07 PROCEDURE — 2580000003 HC RX 258: Performed by: ANESTHESIOLOGY

## 2022-03-07 PROCEDURE — 3600000009 HC SURGERY ROBOT BASE: Performed by: SURGERY

## 2022-03-07 PROCEDURE — 2580000003 HC RX 258: Performed by: STUDENT IN AN ORGANIZED HEALTH CARE EDUCATION/TRAINING PROGRAM

## 2022-03-07 PROCEDURE — 36415 COLL VENOUS BLD VENIPUNCTURE: CPT

## 2022-03-07 PROCEDURE — 6360000002 HC RX W HCPCS: Performed by: STUDENT IN AN ORGANIZED HEALTH CARE EDUCATION/TRAINING PROGRAM

## 2022-03-07 PROCEDURE — 2709999900 HC NON-CHARGEABLE SUPPLY: Performed by: SURGERY

## 2022-03-07 PROCEDURE — 0BQT4ZZ REPAIR DIAPHRAGM, PERCUTANEOUS ENDOSCOPIC APPROACH: ICD-10-PCS | Performed by: SURGERY

## 2022-03-07 PROCEDURE — 7100000000 HC PACU RECOVERY - FIRST 15 MIN: Performed by: SURGERY

## 2022-03-07 PROCEDURE — 2580000003 HC RX 258: Performed by: SURGERY

## 2022-03-07 RX ORDER — DEXAMETHASONE SODIUM PHOSPHATE 10 MG/ML
INJECTION INTRAMUSCULAR; INTRAVENOUS PRN
Status: DISCONTINUED | OUTPATIENT
Start: 2022-03-07 | End: 2022-03-07 | Stop reason: SDUPTHER

## 2022-03-07 RX ORDER — FENTANYL CITRATE 50 UG/ML
INJECTION, SOLUTION INTRAMUSCULAR; INTRAVENOUS PRN
Status: DISCONTINUED | OUTPATIENT
Start: 2022-03-07 | End: 2022-03-07 | Stop reason: SDUPTHER

## 2022-03-07 RX ORDER — SODIUM CHLORIDE 0.9 % (FLUSH) 0.9 %
5-40 SYRINGE (ML) INJECTION PRN
Status: DISCONTINUED | OUTPATIENT
Start: 2022-03-07 | End: 2022-03-09 | Stop reason: HOSPADM

## 2022-03-07 RX ORDER — KETOROLAC TROMETHAMINE 30 MG/ML
INJECTION, SOLUTION INTRAMUSCULAR; INTRAVENOUS PRN
Status: DISCONTINUED | OUTPATIENT
Start: 2022-03-07 | End: 2022-03-07 | Stop reason: SDUPTHER

## 2022-03-07 RX ORDER — SODIUM CHLORIDE, SODIUM LACTATE, POTASSIUM CHLORIDE, CALCIUM CHLORIDE 600; 310; 30; 20 MG/100ML; MG/100ML; MG/100ML; MG/100ML
1000 INJECTION, SOLUTION INTRAVENOUS CONTINUOUS
Status: DISCONTINUED | OUTPATIENT
Start: 2022-03-07 | End: 2022-03-07

## 2022-03-07 RX ORDER — BUPIVACAINE HYDROCHLORIDE 5 MG/ML
INJECTION, SOLUTION PERINEURAL PRN
Status: DISCONTINUED | OUTPATIENT
Start: 2022-03-07 | End: 2022-03-07 | Stop reason: ALTCHOICE

## 2022-03-07 RX ORDER — MAGNESIUM HYDROXIDE 1200 MG/15ML
LIQUID ORAL CONTINUOUS PRN
Status: COMPLETED | OUTPATIENT
Start: 2022-03-07 | End: 2022-03-07

## 2022-03-07 RX ORDER — SODIUM CHLORIDE 9 MG/ML
25 INJECTION, SOLUTION INTRAVENOUS PRN
Status: DISCONTINUED | OUTPATIENT
Start: 2022-03-07 | End: 2022-03-09 | Stop reason: HOSPADM

## 2022-03-07 RX ORDER — INDOCYANINE GREEN AND WATER 25 MG
KIT INJECTION PRN
Status: DISCONTINUED | OUTPATIENT
Start: 2022-03-07 | End: 2022-03-07 | Stop reason: SDUPTHER

## 2022-03-07 RX ORDER — ONDANSETRON 2 MG/ML
INJECTION INTRAMUSCULAR; INTRAVENOUS PRN
Status: DISCONTINUED | OUTPATIENT
Start: 2022-03-07 | End: 2022-03-07 | Stop reason: SDUPTHER

## 2022-03-07 RX ORDER — CYCLOBENZAPRINE HCL 10 MG
10 TABLET ORAL 3 TIMES DAILY PRN
Status: DISCONTINUED | OUTPATIENT
Start: 2022-03-07 | End: 2022-03-09 | Stop reason: HOSPADM

## 2022-03-07 RX ORDER — PROPOFOL 10 MG/ML
INJECTION, EMULSION INTRAVENOUS PRN
Status: DISCONTINUED | OUTPATIENT
Start: 2022-03-07 | End: 2022-03-07 | Stop reason: SDUPTHER

## 2022-03-07 RX ORDER — HEPARIN SODIUM 5000 [USP'U]/ML
5000 INJECTION, SOLUTION INTRAVENOUS; SUBCUTANEOUS ONCE
Status: COMPLETED | OUTPATIENT
Start: 2022-03-07 | End: 2022-03-07

## 2022-03-07 RX ORDER — PROMETHAZINE HYDROCHLORIDE 25 MG/1
25 TABLET ORAL EVERY 6 HOURS PRN
Status: DISCONTINUED | OUTPATIENT
Start: 2022-03-07 | End: 2022-03-09 | Stop reason: HOSPADM

## 2022-03-07 RX ORDER — SODIUM CHLORIDE, SODIUM LACTATE, POTASSIUM CHLORIDE, CALCIUM CHLORIDE 600; 310; 30; 20 MG/100ML; MG/100ML; MG/100ML; MG/100ML
INJECTION, SOLUTION INTRAVENOUS CONTINUOUS
Status: DISCONTINUED | OUTPATIENT
Start: 2022-03-07 | End: 2022-03-08

## 2022-03-07 RX ORDER — SODIUM CHLORIDE 9 MG/ML
25 INJECTION, SOLUTION INTRAVENOUS PRN
Status: DISCONTINUED | OUTPATIENT
Start: 2022-03-07 | End: 2022-03-07 | Stop reason: HOSPADM

## 2022-03-07 RX ORDER — SODIUM CHLORIDE 0.9 % (FLUSH) 0.9 %
5-40 SYRINGE (ML) INJECTION EVERY 12 HOURS SCHEDULED
Status: DISCONTINUED | OUTPATIENT
Start: 2022-03-07 | End: 2022-03-09 | Stop reason: HOSPADM

## 2022-03-07 RX ORDER — SODIUM CHLORIDE 0.9 % (FLUSH) 0.9 %
5-40 SYRINGE (ML) INJECTION EVERY 12 HOURS SCHEDULED
Status: DISCONTINUED | OUTPATIENT
Start: 2022-03-07 | End: 2022-03-07 | Stop reason: HOSPADM

## 2022-03-07 RX ORDER — MAGNESIUM SULFATE 1 G/100ML
INJECTION INTRAVENOUS PRN
Status: DISCONTINUED | OUTPATIENT
Start: 2022-03-07 | End: 2022-03-07 | Stop reason: SDUPTHER

## 2022-03-07 RX ORDER — HEPARIN SODIUM 5000 [USP'U]/ML
5000 INJECTION, SOLUTION INTRAVENOUS; SUBCUTANEOUS EVERY 8 HOURS SCHEDULED
Status: DISCONTINUED | OUTPATIENT
Start: 2022-03-07 | End: 2022-03-09 | Stop reason: HOSPADM

## 2022-03-07 RX ORDER — SCOLOPAMINE TRANSDERMAL SYSTEM 1 MG/1
1 PATCH, EXTENDED RELEASE TRANSDERMAL
Status: DISCONTINUED | OUTPATIENT
Start: 2022-03-07 | End: 2022-03-09 | Stop reason: HOSPADM

## 2022-03-07 RX ORDER — OXYCODONE HYDROCHLORIDE AND ACETAMINOPHEN 5; 325 MG/1; MG/1
1 TABLET ORAL EVERY 6 HOURS PRN
Status: DISCONTINUED | OUTPATIENT
Start: 2022-03-07 | End: 2022-03-09 | Stop reason: HOSPADM

## 2022-03-07 RX ORDER — OXYCODONE HYDROCHLORIDE AND ACETAMINOPHEN 5; 325 MG/1; MG/1
2 TABLET ORAL EVERY 6 HOURS PRN
Status: DISCONTINUED | OUTPATIENT
Start: 2022-03-07 | End: 2022-03-09 | Stop reason: HOSPADM

## 2022-03-07 RX ORDER — ONDANSETRON 2 MG/ML
4 INJECTION INTRAMUSCULAR; INTRAVENOUS EVERY 6 HOURS PRN
Status: DISCONTINUED | OUTPATIENT
Start: 2022-03-07 | End: 2022-03-09 | Stop reason: HOSPADM

## 2022-03-07 RX ORDER — ROCURONIUM BROMIDE 10 MG/ML
INJECTION, SOLUTION INTRAVENOUS PRN
Status: DISCONTINUED | OUTPATIENT
Start: 2022-03-07 | End: 2022-03-07 | Stop reason: SDUPTHER

## 2022-03-07 RX ORDER — PROMETHAZINE HYDROCHLORIDE 25 MG/1
25 TABLET ORAL EVERY 6 HOURS PRN
Qty: 28 TABLET | Refills: 0 | OUTPATIENT
Start: 2022-03-07 | End: 2022-03-14

## 2022-03-07 RX ORDER — LIDOCAINE HYDROCHLORIDE 10 MG/ML
INJECTION, SOLUTION EPIDURAL; INFILTRATION; INTRACAUDAL; PERINEURAL PRN
Status: DISCONTINUED | OUTPATIENT
Start: 2022-03-07 | End: 2022-03-07 | Stop reason: SDUPTHER

## 2022-03-07 RX ORDER — SODIUM CHLORIDE 0.9 % (FLUSH) 0.9 %
5-40 SYRINGE (ML) INJECTION PRN
Status: DISCONTINUED | OUTPATIENT
Start: 2022-03-07 | End: 2022-03-07 | Stop reason: HOSPADM

## 2022-03-07 RX ADMIN — INDOCYANINE GREEN AND WATER 7.5 MG: KIT at 10:42

## 2022-03-07 RX ADMIN — SUGAMMADEX 500 MG: 100 INJECTION, SOLUTION INTRAVENOUS at 11:12

## 2022-03-07 RX ADMIN — FENTANYL CITRATE 25 MCG: 50 INJECTION, SOLUTION INTRAMUSCULAR; INTRAVENOUS at 08:08

## 2022-03-07 RX ADMIN — PHENYLEPHRINE HYDROCHLORIDE 100 MCG: 10 INJECTION INTRAVENOUS at 08:28

## 2022-03-07 RX ADMIN — FENTANYL CITRATE 25 MCG: 50 INJECTION, SOLUTION INTRAMUSCULAR; INTRAVENOUS at 08:02

## 2022-03-07 RX ADMIN — OXYCODONE HYDROCHLORIDE AND ACETAMINOPHEN 2 TABLET: 5; 325 TABLET ORAL at 20:38

## 2022-03-07 RX ADMIN — SODIUM CHLORIDE, POTASSIUM CHLORIDE, SODIUM LACTATE AND CALCIUM CHLORIDE: 600; 310; 30; 20 INJECTION, SOLUTION INTRAVENOUS at 07:06

## 2022-03-07 RX ADMIN — LIDOCAINE HYDROCHLORIDE 50 MG: 10 INJECTION, SOLUTION EPIDURAL; INFILTRATION; INTRACAUDAL; PERINEURAL at 07:15

## 2022-03-07 RX ADMIN — ONDANSETRON 4 MG: 2 INJECTION INTRAMUSCULAR; INTRAVENOUS at 10:52

## 2022-03-07 RX ADMIN — Medication 3000 MG: at 07:21

## 2022-03-07 RX ADMIN — DEXAMETHASONE SODIUM PHOSPHATE 10 MG: 10 INJECTION INTRAMUSCULAR; INTRAVENOUS at 07:15

## 2022-03-07 RX ADMIN — FAMOTIDINE 20 MG: 10 INJECTION INTRAVENOUS at 14:01

## 2022-03-07 RX ADMIN — CEFAZOLIN SODIUM 2000 MG: 10 INJECTION, POWDER, FOR SOLUTION INTRAVENOUS at 15:47

## 2022-03-07 RX ADMIN — MAGNESIUM SULFATE HEPTAHYDRATE 1000 MG: 1 INJECTION, SOLUTION INTRAVENOUS at 07:42

## 2022-03-07 RX ADMIN — METRONIDAZOLE 500 MG: 500 INJECTION, SOLUTION INTRAVENOUS at 23:54

## 2022-03-07 RX ADMIN — SODIUM CHLORIDE, POTASSIUM CHLORIDE, SODIUM LACTATE AND CALCIUM CHLORIDE: 600; 310; 30; 20 INJECTION, SOLUTION INTRAVENOUS at 23:53

## 2022-03-07 RX ADMIN — FENTANYL CITRATE 100 MCG: 50 INJECTION, SOLUTION INTRAMUSCULAR; INTRAVENOUS at 07:15

## 2022-03-07 RX ADMIN — HYDROMORPHONE HYDROCHLORIDE 0.5 MG: 1 INJECTION, SOLUTION INTRAMUSCULAR; INTRAVENOUS; SUBCUTANEOUS at 12:02

## 2022-03-07 RX ADMIN — ROCURONIUM BROMIDE 20 MG: 10 INJECTION INTRAVENOUS at 08:44

## 2022-03-07 RX ADMIN — METRONIDAZOLE 500 MG: 500 INJECTION, SOLUTION INTRAVENOUS at 07:33

## 2022-03-07 RX ADMIN — SODIUM CHLORIDE, POTASSIUM CHLORIDE, SODIUM LACTATE AND CALCIUM CHLORIDE: 600; 310; 30; 20 INJECTION, SOLUTION INTRAVENOUS at 08:54

## 2022-03-07 RX ADMIN — METRONIDAZOLE 500 MG: 500 INJECTION, SOLUTION INTRAVENOUS at 15:50

## 2022-03-07 RX ADMIN — PHENYLEPHRINE HYDROCHLORIDE 100 MCG: 10 INJECTION INTRAVENOUS at 08:37

## 2022-03-07 RX ADMIN — SODIUM CHLORIDE, POTASSIUM CHLORIDE, SODIUM LACTATE AND CALCIUM CHLORIDE: 600; 310; 30; 20 INJECTION, SOLUTION INTRAVENOUS at 12:06

## 2022-03-07 RX ADMIN — PHENYLEPHRINE HYDROCHLORIDE 100 MCG: 10 INJECTION INTRAVENOUS at 08:09

## 2022-03-07 RX ADMIN — FAMOTIDINE 20 MG: 10 INJECTION INTRAVENOUS at 20:42

## 2022-03-07 RX ADMIN — FENTANYL CITRATE 50 MCG: 50 INJECTION, SOLUTION INTRAMUSCULAR; INTRAVENOUS at 07:37

## 2022-03-07 RX ADMIN — PHENYLEPHRINE HYDROCHLORIDE 100 MCG: 10 INJECTION INTRAVENOUS at 07:30

## 2022-03-07 RX ADMIN — ROCURONIUM BROMIDE 20 MG: 10 INJECTION INTRAVENOUS at 09:25

## 2022-03-07 RX ADMIN — KETOROLAC TROMETHAMINE 15 MG: 30 INJECTION, SOLUTION INTRAMUSCULAR at 10:53

## 2022-03-07 RX ADMIN — ROCURONIUM BROMIDE 20 MG: 10 INJECTION INTRAVENOUS at 10:06

## 2022-03-07 RX ADMIN — HEPARIN SODIUM 5000 UNITS: 5000 INJECTION INTRAVENOUS; SUBCUTANEOUS at 06:37

## 2022-03-07 RX ADMIN — ROCURONIUM BROMIDE 70 MG: 10 INJECTION INTRAVENOUS at 07:15

## 2022-03-07 RX ADMIN — PHENYLEPHRINE HYDROCHLORIDE 100 MCG: 10 INJECTION INTRAVENOUS at 08:21

## 2022-03-07 RX ADMIN — HEPARIN SODIUM 5000 UNITS: 5000 INJECTION INTRAVENOUS; SUBCUTANEOUS at 21:22

## 2022-03-07 RX ADMIN — OXYCODONE HYDROCHLORIDE AND ACETAMINOPHEN 2 TABLET: 5; 325 TABLET ORAL at 14:00

## 2022-03-07 RX ADMIN — ROCURONIUM BROMIDE 20 MG: 10 INJECTION INTRAVENOUS at 07:53

## 2022-03-07 RX ADMIN — PROPOFOL 300 MG: 10 INJECTION, EMULSION INTRAVENOUS at 07:15

## 2022-03-07 ASSESSMENT — PULMONARY FUNCTION TESTS
PIF_VALUE: 32
PIF_VALUE: 29
PIF_VALUE: 20
PIF_VALUE: 29
PIF_VALUE: 20
PIF_VALUE: 27
PIF_VALUE: 23
PIF_VALUE: 31
PIF_VALUE: 35
PIF_VALUE: 25
PIF_VALUE: 24
PIF_VALUE: 29
PIF_VALUE: 33
PIF_VALUE: 30
PIF_VALUE: 31
PIF_VALUE: 32
PIF_VALUE: 30
PIF_VALUE: 25
PIF_VALUE: 20
PIF_VALUE: 30
PIF_VALUE: 36
PIF_VALUE: 27
PIF_VALUE: 29
PIF_VALUE: 31
PIF_VALUE: 0
PIF_VALUE: 30
PIF_VALUE: 32
PIF_VALUE: 31
PIF_VALUE: 33
PIF_VALUE: 21
PIF_VALUE: 30
PIF_VALUE: 30
PIF_VALUE: 31
PIF_VALUE: 36
PIF_VALUE: 31
PIF_VALUE: 27
PIF_VALUE: 30
PIF_VALUE: 33
PIF_VALUE: 34
PIF_VALUE: 30
PIF_VALUE: 32
PIF_VALUE: 23
PIF_VALUE: 10
PIF_VALUE: 31
PIF_VALUE: 31
PIF_VALUE: 34
PIF_VALUE: 32
PIF_VALUE: 32
PIF_VALUE: 30
PIF_VALUE: 33
PIF_VALUE: 32
PIF_VALUE: 30
PIF_VALUE: 29
PIF_VALUE: 36
PIF_VALUE: 30
PIF_VALUE: 34
PIF_VALUE: 33
PIF_VALUE: 2
PIF_VALUE: 37
PIF_VALUE: 29
PIF_VALUE: 30
PIF_VALUE: 31
PIF_VALUE: 25
PIF_VALUE: 30
PIF_VALUE: 33
PIF_VALUE: 32
PIF_VALUE: 32
PIF_VALUE: 30
PIF_VALUE: 33
PIF_VALUE: 30
PIF_VALUE: 31
PIF_VALUE: 32
PIF_VALUE: 24
PIF_VALUE: 33
PIF_VALUE: 22
PIF_VALUE: 30
PIF_VALUE: 30
PIF_VALUE: 32
PIF_VALUE: 30
PIF_VALUE: 34
PIF_VALUE: 32
PIF_VALUE: 30
PIF_VALUE: 34
PIF_VALUE: 32
PIF_VALUE: 24
PIF_VALUE: 32
PIF_VALUE: 34
PIF_VALUE: 30
PIF_VALUE: 31
PIF_VALUE: 30
PIF_VALUE: 28
PIF_VALUE: 32
PIF_VALUE: 30
PIF_VALUE: 31
PIF_VALUE: 33
PIF_VALUE: 10
PIF_VALUE: 32
PIF_VALUE: 31
PIF_VALUE: 30
PIF_VALUE: 32
PIF_VALUE: 26
PIF_VALUE: 28
PIF_VALUE: 31
PIF_VALUE: 31
PIF_VALUE: 35
PIF_VALUE: 32
PIF_VALUE: 31
PIF_VALUE: 21
PIF_VALUE: 36
PIF_VALUE: 33
PIF_VALUE: 32
PIF_VALUE: 30
PIF_VALUE: 34
PIF_VALUE: 30
PIF_VALUE: 32
PIF_VALUE: 30
PIF_VALUE: 29
PIF_VALUE: 25
PIF_VALUE: 25
PIF_VALUE: 33
PIF_VALUE: 21
PIF_VALUE: 31
PIF_VALUE: 30
PIF_VALUE: 33
PIF_VALUE: 31
PIF_VALUE: 30
PIF_VALUE: 32
PIF_VALUE: 31
PIF_VALUE: 24
PIF_VALUE: 33
PIF_VALUE: 31
PIF_VALUE: 32
PIF_VALUE: 31
PIF_VALUE: 30
PIF_VALUE: 25
PIF_VALUE: 30
PIF_VALUE: 35
PIF_VALUE: 31
PIF_VALUE: 29
PIF_VALUE: 31
PIF_VALUE: 32
PIF_VALUE: 37
PIF_VALUE: 33
PIF_VALUE: 32
PIF_VALUE: 1
PIF_VALUE: 30
PIF_VALUE: 31
PIF_VALUE: 34
PIF_VALUE: 21
PIF_VALUE: 32
PIF_VALUE: 37
PIF_VALUE: 36
PIF_VALUE: 33
PIF_VALUE: 34
PIF_VALUE: 24
PIF_VALUE: 24
PIF_VALUE: 33
PIF_VALUE: 20
PIF_VALUE: 31
PIF_VALUE: 0
PIF_VALUE: 37
PIF_VALUE: 8
PIF_VALUE: 32
PIF_VALUE: 29
PIF_VALUE: 35
PIF_VALUE: 34
PIF_VALUE: 25
PIF_VALUE: 30
PIF_VALUE: 34
PIF_VALUE: 29
PIF_VALUE: 33
PIF_VALUE: 28
PIF_VALUE: 33
PIF_VALUE: 31
PIF_VALUE: 31
PIF_VALUE: 12
PIF_VALUE: 34
PIF_VALUE: 37
PIF_VALUE: 34
PIF_VALUE: 33
PIF_VALUE: 33
PIF_VALUE: 36
PIF_VALUE: 24
PIF_VALUE: 32
PIF_VALUE: 32
PIF_VALUE: 24
PIF_VALUE: 32
PIF_VALUE: 18
PIF_VALUE: 32
PIF_VALUE: 30
PIF_VALUE: 31
PIF_VALUE: 30
PIF_VALUE: 32
PIF_VALUE: 30
PIF_VALUE: 29
PIF_VALUE: 36
PIF_VALUE: 30
PIF_VALUE: 32
PIF_VALUE: 37
PIF_VALUE: 35
PIF_VALUE: 30
PIF_VALUE: 2
PIF_VALUE: 31
PIF_VALUE: 37
PIF_VALUE: 20
PIF_VALUE: 31
PIF_VALUE: 29
PIF_VALUE: 34
PIF_VALUE: 32
PIF_VALUE: 30
PIF_VALUE: 31
PIF_VALUE: 30
PIF_VALUE: 31
PIF_VALUE: 20
PIF_VALUE: 20
PIF_VALUE: 35
PIF_VALUE: 32
PIF_VALUE: 29
PIF_VALUE: 31
PIF_VALUE: 33
PIF_VALUE: 0
PIF_VALUE: 30
PIF_VALUE: 34
PIF_VALUE: 34
PIF_VALUE: 33
PIF_VALUE: 0
PIF_VALUE: 29
PIF_VALUE: 34
PIF_VALUE: 32
PIF_VALUE: 32
PIF_VALUE: 33
PIF_VALUE: 31
PIF_VALUE: 30
PIF_VALUE: 32
PIF_VALUE: 36
PIF_VALUE: 34
PIF_VALUE: 29
PIF_VALUE: 28
PIF_VALUE: 31
PIF_VALUE: 30
PIF_VALUE: 34
PIF_VALUE: 32
PIF_VALUE: 30
PIF_VALUE: 34
PIF_VALUE: 37
PIF_VALUE: 30
PIF_VALUE: 26
PIF_VALUE: 21
PIF_VALUE: 31
PIF_VALUE: 23
PIF_VALUE: 32
PIF_VALUE: 35
PIF_VALUE: 24
PIF_VALUE: 31
PIF_VALUE: 31
PIF_VALUE: 32
PIF_VALUE: 32
PIF_VALUE: 33

## 2022-03-07 ASSESSMENT — PAIN DESCRIPTION - LOCATION
LOCATION: ABDOMEN

## 2022-03-07 ASSESSMENT — PAIN SCALES - GENERAL
PAINLEVEL_OUTOF10: 8
PAINLEVEL_OUTOF10: 2
PAINLEVEL_OUTOF10: 10
PAINLEVEL_OUTOF10: 10
PAINLEVEL_OUTOF10: 8

## 2022-03-07 ASSESSMENT — PAIN DESCRIPTION - PAIN TYPE
TYPE: SURGICAL PAIN

## 2022-03-07 ASSESSMENT — PAIN DESCRIPTION - FREQUENCY
FREQUENCY: CONTINUOUS
FREQUENCY: CONTINUOUS

## 2022-03-07 ASSESSMENT — PAIN DESCRIPTION - DESCRIPTORS
DESCRIPTORS: ACHING
DESCRIPTORS: CONSTANT;ACHING
DESCRIPTORS: CONSTANT;ACHING

## 2022-03-07 ASSESSMENT — PAIN DESCRIPTION - ORIENTATION: ORIENTATION: MID

## 2022-03-07 ASSESSMENT — PAIN - FUNCTIONAL ASSESSMENT: PAIN_FUNCTIONAL_ASSESSMENT: 0-10

## 2022-03-07 NOTE — DISCHARGE SUMMARY
Surgery Discharge Summary     Patient Identification  Dolores Jacobson is a 27 y.o. female. :  1990  Admit Date:  3/7/2022    Discharge date: 3/9/2022                                  Disposition: home    Discharge Diagnoses:   Patient Active Problem List   Diagnosis    H/O abnormal cervical Papanicolaou smear    Anal warts    Rh+/RI/GBS-    H/O LGSIL (1/29/15)    S/P bariatric surgery    Hiatal hernia with GERD and esophagitis    Morbid obesity ([address]    Status post gastric surgery    History of sleeve gastrectomy    Low vitamin B12 level    Low folate    Vitamin D deficiency       Condition on discharge: Stable    Consults: None    Surgery: Laparoscopic sleeve gastrectomy conversion to Jesus-en-Y gastric bypass, liver biopsy, EGD    Patient Instructions: Activity: no heavy lifting, pushing, pulling for 6 weeks, no driving for 2 weeks or while on analgesics  Diet: Bariatric clear  Follow-up with Dr. Michelle Pacheco in 2 weeks. See pre-printed instructions in chart and given to patient upon discharge. Discharge Medications:        Medication List        ASK your doctor about these medications      famotidine 20 MG tablet  Commonly known as: PEPCID  Take 1 tablet by mouth nightly     folic acid 1 MG tablet  Commonly known as: FOLVITE  Take 1 tablet by mouth daily     pantoprazole 40 MG tablet  Commonly known as: Protonix  Take 1 tablet by mouth daily     vitamin B-12 500 MCG tablet  Commonly known as: CYANOCOBALAMIN  Take 1 tablet by mouth daily               HPI and Hospital Course:   27 y.o. female presented on 3/7/2022 for elective laparoscopic sleeve gastrectomy conversion to Jesus-en-Y gastric bypass. Hospital course was unremarkable. On day of discharge pt was tolerating diet, pain controlled with oral medications and ambulating with wheeled walker.       Electronically signed by Shaunna Roger MD on 3/7/2022 at 6:58 AM

## 2022-03-07 NOTE — INTERVAL H&P NOTE
Pt Name: Eddie Shaw  MRN: [id number]  YOB: 1990  Date of evaluation: 3/7/2022    I have reviewed the patient's history and physical examination completed in pre-admission testing on 2/21/22    No changes to history or on examination today, unless noted below. Medical clearance obtained, copy on file.      PRADIP COSBY - CNP  3/7/22  6:20 AM

## 2022-03-07 NOTE — ANESTHESIA PRE PROCEDURE
Department of Anesthesiology  Preprocedure Note       Name:  Norris Gilliam   Age:  27 y.o.  :  1990                                          MRN:  [id number]         Date:  3/7/2022      Surgeon: Jing Hamilton):  Paty Zapien DO    Procedure: Procedure(s):  XI ROBOTIC LAPAROSCOPIC GASTRIC SLEEVE TO JUAN-EN-Y GASTRIC BYPASS WITH LIVER BIOPSY, EGD, POSSIBLE OPEN -GI UNIT SCHEDULED    Medications prior to admission:   Prior to Admission medications    Medication Sig Start Date End Date Taking? Authorizing Provider   vitamin B-12 (CYANOCOBALAMIN) 500 MCG tablet Take 1 tablet by mouth daily 10/28/21 10/28/22 Yes PRADIP Elias CNP   folic acid (FOLVITE) 1 MG tablet Take 1 tablet by mouth daily 10/28/21  Yes PRADIP Elias CNP   pantoprazole (PROTONIX) 40 MG tablet Take 1 tablet by mouth daily  Patient taking differently: Take 40 mg by mouth daily In am 21  Yes PRADIP Elias CNP   famotidine (PEPCID) 20 MG tablet Take 1 tablet by mouth nightly 21  Yes PRADIP Elias CNP       Current medications:    Current Facility-Administered Medications   Medication Dose Route Frequency Provider Last Rate Last Admin    ceFAZolin (ANCEF) 3000 mg in dextrose 5 % 100 mL IVPB  3,000 mg IntraVENous Once [facility], DO        metronidazole (FLAGYL) 500 mg in NaCl 100 mL IVPB premix  500 mg IntraVENous Once Alba [facility], DO        lactated ringers infusion 1,000 mL  1,000 mL IntraVENous Continuous Brady Rutledge MD           Allergies:     Allergies   Allergen Reactions    Latex Dermatitis       Problem List:    Patient Active Problem List   Diagnosis Code    H/O abnormal cervical Papanicolaou smear Z87.42    Anal warts A63.0    Rh+/RI/GBS- O09.90    H/O LGSIL (1/29/15) R87.622    S/P bariatric surgery Z98.84    Hiatal hernia with GERD and esophagitis K44.9, K21.00    Morbid obesity ([facility] [address] E66.01    Status post gastric surgery Z98.890    History of sleeve gastrectomy Z90.3    Low vitamin B12 level E53.8    Low folate E53.8    Vitamin D deficiency E55.9       Past Medical History:        Diagnosis Date    Abnormal Pap smear of cervix     Anemia complicating pregnancy in second trimester 2015    COVID-19 2021 night sweats, cough, sore throat x 1 week    Gastroesophageal reflux disease 2019    on rx    Morbidly obese ([address]     Snores     no sleep apnea    Wellness examination     Monicabel Nicole APRN- [address]  last seen   . Seeing 22 for clearance. Past Surgical History:        Procedure Laterality Date     SECTION  2015    GASTRIC BAND  2019    GASTRIC SLEEVE    UPPER GASTROINTESTINAL ENDOSCOPY N/A 2019    EGD BIOPSY performed by Saul Cardona MD at [place] [address] N/A 10/08/2021    EGD BIOPSY performed by Chris Hanks DO at [address] [place] [address] History:    Social History     Tobacco Use    Smoking status: Never Smoker    Smokeless tobacco: Never Used   Substance Use Topics    Alcohol use:  No                                Counseling given: Not Answered      Vital Signs (Current):   Vitals:    22 0604   BP: 131/74   Pulse: 83   Resp: 14   Temp: 96.8 °F (36 °C)   TempSrc: Temporal   SpO2: 98%   Weight: (!) 304 lb 8 oz (138.1 kg)   Height: 5' 6\" (1.676 m)                                              BP Readings from Last 3 Encounters:   22 131/74   22 124/86   22 128/84       NPO Status: Time of last liquid consumption: 0500                        Time of last solid consumption: 190                        Date of last liquid consumption: 22                        Date of last solid food consumption: 22    BMI:   Wt Readings from Last 3 Encounters:   22 (!) 304 lb 8 oz (138.1 kg)   22 (!) 305 lb (138.3 kg)   22 (!) 315 lb (142.9 kg)     Body mass index is 49.15 kg/m². CBC:   Lab Results   Component Value Date    WBC 9.2 02/21/2022    RBC 4.52 02/21/2022    HGB 12.1 02/21/2022    HCT 39.5 02/21/2022    MCV 87.4 02/21/2022    RDW 14.6 02/21/2022     02/21/2022       CMP:   Lab Results   Component Value Date     02/21/2022    K 3.7 02/21/2022     02/21/2022    CO2 23 02/21/2022    BUN 9 02/21/2022    CREATININE 0.77 02/21/2022    GFRAA >60 02/21/2022    LABGLOM >60 02/21/2022    GLUCOSE 65 02/21/2022    PROT 8.5 10/28/2021    CALCIUM 9.0 02/21/2022    BILITOT 0.53 10/28/2021    ALKPHOS 97 10/28/2021    AST 14 10/28/2021    ALT 11 10/28/2021       POC Tests: No results for input(s): POCGLU, POCNA, POCK, POCCL, POCBUN, POCHEMO, POCHCT in the last 72 hours.     Coags:   Lab Results   Component Value Date    PROTIME 10.8 02/21/2022    INR 1.0 02/21/2022    APTT 27.6 02/21/2022       HCG (If Applicable):   Lab Results   Component Value Date    PREGTESTUR negative 12/09/2020    HCG NEGATIVE 10/08/2021    HCGQUANT 22,291 (H) 05/28/2015        ABGs: No results found for: PHART, PO2ART, FGJ4GGL, BUA7RVH, BEART, C5UUOXAE     Type & Screen (If Applicable):  No results found for: LABABO, LABRH    Drug/Infectious Status (If Applicable):  No results found for: HIV, HEPCAB    COVID-19 Screening (If Applicable):   Lab Results   Component Value Date    COVID19 DETECTED 12/30/2021    COVID19 Not Detected 10/04/2021           Anesthesia Evaluation  Patient summary reviewed and Nursing notes reviewed no history of anesthetic complications:   Airway: Mallampati: II  TM distance: >3 FB   Neck ROM: full  Mouth opening: > = 3 FB Dental: normal exam         Pulmonary:Negative Pulmonary ROS and normal exam                               Cardiovascular:  Exercise tolerance: good (>4 METS),       (-) past MI, CAD, CABG/stent, dysrhythmias,  angina and  CHF      Rhythm: regular  Rate: normal                    Neuro/Psych:   (+) neuromuscular disease:, GI/Hepatic/Renal:   (+) GERD:, morbid obesity          Endo/Other: Negative Endo/Other ROS                    Abdominal:             Vascular: Other Findings:             Anesthesia Plan      general     ASA 2       Induction: intravenous. MIPS: Postoperative opioids intended and Prophylactic antiemetics administered. Anesthetic plan and risks discussed with patient. Use of blood products discussed with patient whom.    Plan discussed with CRNA and surgical team.                  Zenia Ocasio MD   3/7/2022

## 2022-03-07 NOTE — BRIEF OP NOTE
Brief Postoperative Note      Patient: Iesha Peguero  YOB: 1990  MRN: [id number]    Date of Procedure: 3/7/2022    Pre-Op Diagnosis: MORBID OBESITY    Post-Op Diagnosis: Same       Procedure(s):  XI ROBOTIC LAPAROSCOPIC GASTRIC SLEEVE TO JUAN-EN-Y GASTRIC BYPASS, EGD, WITH UMBILICAL HERNIA REPAIR    Surgeon(s):  Jaye Villafana DO    Assistant:  First Assistant: Erika Slater RN  Resident: Kenneth Khanna MD    Anesthesia: General    Estimated Blood Loss (mL): Minimal    Complications: None    Specimens:   * No specimens in log *    Implants:  * No implants in log *      Drains:   Closed/Suction Drain Right RUQ Bulb 19 Frisian (Active)       Open Drain Lateral LUQ  (Active)       Findings: see note    Electronically signed by Jaye Villafana DO on 3/7/2022 at 11:27 AM

## 2022-03-07 NOTE — ANESTHESIA POSTPROCEDURE EVALUATION
Department of Anesthesiology  Postprocedure Note    Patient: Caroline Horner  MRN: [id number]  [place]: 1990  Date of evaluation: 3/7/2022  Time:  3:24 PM     Procedure Summary     Date: 03/07/22 Room / Location: [facility] / [address] [place]    Anesthesia Start: 2183 Anesthesia Stop: 4987    Procedure: XI ROBOTIC LAPAROSCOPIC GASTRIC SLEEVE TO JUAN-EN-Y GASTRIC BYPASS, EGD, WITH UMBILICAL HERNIA REPAIR (N/A Abdomen) Diagnosis: (MORBID OBESITY)    Surgeons: Chris Hanks DO Responsible Provider: Ned Torres MD    Anesthesia Type: general ASA Status: 2          Anesthesia Type: general    Amber Phase I: Amber Score: 9    Amber Phase II:      Last vitals: Reviewed and per EMR flowsheets.        Anesthesia Post Evaluation    Patient location during evaluation: PACU  Patient participation: complete - patient participated  Level of consciousness: awake and alert  Pain score: 4  Airway patency: patent  Nausea & Vomiting: no nausea and no vomiting  Complications: no  Cardiovascular status: hemodynamically stable  Respiratory status: room air  Hydration status: euvolemic

## 2022-03-07 NOTE — OP NOTE
Operative Note      Patient: Yaneth Wilcox  YOB: 1990  MRN: [id number]    Date of Procedure: 3/7/2022    Pre-Op Diagnosis: MORBID OBESITY, BMI 49, Intractable GERD with hiatal hernia and esophagitis    Post-Op Diagnosis: Same, UMBILICAL HERNIA, RECURRENT HIATAL HERNIA       Procedure(s):  XI ROBOTIC LAPAROSCOPIC GASTRIC SLEEVE TO JUAN-EN-Y GASTRIC BYPASS, EGD, WITH UMBILICAL HERNIA REPAIR, RECURRENT HIATAL HERNIA REPAIR    Surgeon(s):  Steff Eagle DO    Assistant:   First Assistant: Brenda Kenney; Josehp George RN  Resident: Conrad Irizarry MD    Anesthesia: General    Estimated Blood Loss (mL): Minimal    Complications: None    Specimens:   * No specimens in log *    Implants:  * No implants in log *      Drains:   Closed/Suction Drain Right RUQ Bulb 19 Belarusian (Active)       Open Drain Lateral LUQ  (Active)       Indications for procedure: The patient is a pleasant 27 y.o. female with morbid obesity and hiatal hernia after sleeve gastrectomy with GERD. They have a BMI of Body mass index is 49 kg/m². They've completed medical risk stratification and are scheduled for a Juan-en-Y gastric bypass. She underwent EGD showing a recurrent hiatal hernia with ulcerative esophagitis. Consent: The patient was seen and evaluated in the office setting where the procedure was explained to the patient and all questions were answered. Discussion was held between Dr. Vitaliy Espinoza and the patient. Viable alternatives to the proposed procedure including but not limited to medical observation under the care of a physician exercise programs and other weight reductive operative procedures were explained to the patient.   Risks of the proposed procedure including but not limited to hemorrhage requiring transfusion, infection, nerve injury, conversion to open procedure, anastomotic disruption, anastomotic stricture, pneumonia, pulmonary embolus, airway complications and death were explained to the patient. The patient understands the above alternatives and risks and has elected chosen laparoscopic Jesus-en-Y gastric bypass and wishes to proceed with the procedure. Description of procedure: The patient was taken to the operating room and placed in supine position. After successful induction of general endotracheal anesthesia by the anesthesia department a Praasd catheter and orogastric tube was placed to decompress the bladder and stomach respectively. The patient was placed in split leg lithotomy position and care was taken to pad the exposed extremities. Then was prepped and draped in normal sterile fashion. A 12 mm Optiview trocar was placed in the left sub-costal position in the midclavicular line and access to the abdominal cavity was obtained under visualization. Pneumoperitoneum was then established without complications. After evaluation of the abdominal contents from this trocar position 5 other ports were placed under direct visualization. One at the umbilicus, one 2-3 cm above the umbilicus, one at the subxiphoid area, one in the right subcostal margin in the midclavicular line, and the last one further lateral to the original Optiview port. A solution of 0.5% Marcaine was used to infiltrate the subcutaneous tissues prior to trocar placement. The robot docked. There was a recurrent hiatal hernia and the liver normal sized. We lysed adhesions in the left upper quadrant for over 15 minutes to free the sleeve and omentum from the liver and abdominal wall to allow for placement of a liver retractor and port placement. At this time due to a hiatal hernia decision was made to perform hiatal hernia repair. We mobilized the pars flaccida until the right kofi was noted and we could begin our dissection. Using accommodation of blunt and sharp dissection as well as electrocautery a window was created along the right kofi along the esophagus.   The avascular plane was entered and this facilitated our 360 degree circumferential dissection exposing and dissecting the phrenoesophageal ligament. Careful dissection circumferentially around the esophagus to expose the left kofi. The right and left vagus nerves were identified. The hernia sac dissected from the mediastinum and pleura. The sac reduced from the esophagus and mediastinum. Upon completion of full dissection around the esophagus we then used O Ethibond suture to reapproximate the anterior and posterior kofi. A 50 Occitan bougie was then passed by anesthesia under direct visualization until we could see this within the lumen of the stomach and this was across the crura in satisfactory position. Using the Ethibond we then closed the the hiatal hernia defect. A blunt probe could easily be passed between the crural defect closure and the esophagus while the bougie was in place. Satisfactory repair noted. After decompression of the stomach with the orogastric tube, the orogastric tube and any esophageal probes were removed from the patient. This was reviewed with the anesthesia team. We made a window along the lesser curvature through the pars flaccida with the Vessel Sealer and began the dissection. This allowed entry into the lesser sac. Once the lesser sac was entered a 60 mm x 2.5 mm TAYLA stapler was used to come across the neurovascular bundle. A green load 60 mm stapler then fired across the stomach, followed by a green load to complete transection. We did have to take down some more adhesions to free up the new gastric pouch. The adhesions were between the stomach and liver, stomach and pancreas, stomach and omentum. We lysed adhesions for over 20 minutes to mobilize the gastric pouch. The adhesions between the sleeve and pancreas were very dense necessitating meticulous dissection. The pouch was very free and mobile after we took down the adhesions. A 20-30 mL gastric pouch was created.   After this was done the [facility] anvil for the EEA stapler was placed transorally. This anvil was attached to the an 18 [place] Jazmín orogastric tube and was placed by anesthesia without complications. The orogastric tube was seen in the gastric pouch. The scissors was used to make a gastrotomy in the gastric pouch and the orogastric tube was pulled out of the gastric pouch. At this point the orogastric tube was detached from the anvil and removed, leaving the anvil in the gastric pouch. Having successfully completed our small gastric pouch and anvil placement, we turned our attention to dividing the greater omentum. This was accomplished with the Vessel Sealer. After this we identified the ligament of Treitz and the inferior mesenteric vein, and went 60 cm distal to that to avoid a mesenteric lipoma. Clips used to then clyde the proximal and distal bowel and then we divided the bowel using a 60 mm × 2.5 mm TAYLA stapler. The mesentery was also transected using a Vessel Sealer. After this was done we brought the efferent limb all the way up to the gastric pouch in between our previously created defect in the greater omentum. The staple line and the jejunum was opened using electrocautery and after enlarging the lateral trocar site the 25 mm EEA stapler was introduced into the abdomen and into the open jejunum. Using the EEA stapler we performed our gastrojejunostomy. The stapler was removed and 2 complete donuts were identified. The open ended jejunum was closed using a 60 mm x 2.5 mm TAYLA stapler and bowel continuity reestablished. This amputated piece of jejunum was placed in an Endopouch and removed from the abdomen. We then measured distal on the small bowel and oriented our bowel 120 cm to create the jejunojejunostomy. Bowel somewhat distended from intubation, making measurements difficult.    An enterotomy was created in each limb with the cautery and using the triple stapling technique created a side-to-side jejunojejunostomy. 60 mm x 2.5 mm staple loads were utilized. The opening was evaluated for hemostasis and care was taken to make sure the posterior wall of the anastomosis was free. This was  directly visualized under laparoscopic visualization. The remaining free defect was closed with one or more firings of a 60 mm x 2.5 mm linear stapler. Numerous sutures of 3-0 Ethibond sutures were used to close the defect of the mesentery at the jejunojejunostomy. The piece of small bowel from the triple staple technique was then withdrawn from the abdomen. The staple line was noted to be intact and to have captured serosa the entire length of the anastomosis. The gastric remnant without signs of bleeding or ischemia. I then placed two 3-0 Ethibond sutures to close Roberts's space. Having restored bowel continuity we placed multiple interrupted sutures to reinforce the gastrojejunal anastomosis. Interrupted 3-0 Vicryl sutures were placed laterally, posterior laterally and anteriorly so that we could circumferentially reinforce the anastomosis. The bowel was pink and viable, and there was no tension on the anastomosis. Having completed our reinforced sutures we then proceeded with checking the anastomosis for leakage. After completing the gastrojejunostomy, upper GI endoscopy was performed in order to evaluate the integrity of the anastomosis. The Olympus gastroscope was introduced through the mouth, through the esophagus and into the small gastric pouch. The anastomosis was noted to be patent widely. The lateral gastric staple line and gastrojejunal anastomosis were hemostatic. We did have to place additional 3 - 0 sutures at the anastomosis to obtain hemostasis  The scope passed through the anastomosis into the jejunum in the liseth limb and blind pouch. The anastomosis was submerged under irrigation placed in the abdomen.   There was no evidence of air extravasation on the intraoperative operative leak test performed. The air was evacuated and the scope removed from the patient. A 19 perforated Nicholas drain was left posterior to the anastomosis and brought out through the right upper quadrant incision. This was secured with a 2-0 Nylon suture. I made a separate incision just above the umbilicus for an umbilical hernia I thought needed repair as it put the liseth limb at risk. This was repaired with 0 vicryl sutures and a suture passer laparoscopically. Fascial re-approximation noted. Of note the patient had a very large diastasis and very poor fascial quality. The abdomen was then copiously irrigated and checked for hemostasis. The effluent was evacuated from the abdomen and then we then turned our attention to closure of the trocar sites. At this point prior to evacuation of the pneumoperitoneum we closed all our 12 mm ports with laparoscopic suture fascial closure device using 0 Vicryl. Penrose placed in the left lateral incision and secured with a 3-0 nylon. The pneumoperitoneum was then evacuated. The wounds were irrigated and found to be hemostatic. The skin was closed using 4-0 Vicryl in a running subcuticular fashion. Steri-Strips were applied to the wounds the patient was awakened from anesthesia extubated and taken to recovery in stable condition.       Electronically signed by Nava Abreu DO on 3/7/2022 at 11:26 AM

## 2022-03-08 ENCOUNTER — TELEPHONE (OUTPATIENT)
Dept: OBGYN CLINIC | Age: 32
End: 2022-03-08

## 2022-03-08 ENCOUNTER — APPOINTMENT (OUTPATIENT)
Dept: GENERAL RADIOLOGY | Age: 32
DRG: 403 | End: 2022-03-08
Attending: SURGERY
Payer: MEDICARE

## 2022-03-08 LAB
ABSOLUTE EOS #: <0.03 K/UL (ref 0–0.44)
ABSOLUTE IMMATURE GRANULOCYTE: 0.13 K/UL (ref 0–0.3)
ABSOLUTE LYMPH #: 2.15 K/UL (ref 1.1–3.7)
ABSOLUTE MONO #: 1.19 K/UL (ref 0.1–1.2)
ANION GAP SERPL CALCULATED.3IONS-SCNC: 13 MMOL/L (ref 9–17)
BASOPHILS # BLD: 0 % (ref 0–2)
BASOPHILS ABSOLUTE: 0.03 K/UL (ref 0–0.2)
BUN BLDV-MCNC: 7 MG/DL (ref 6–20)
CALCIUM SERPL-MCNC: 8.6 MG/DL (ref 8.6–10.4)
CHLORIDE BLD-SCNC: 103 MMOL/L (ref 98–107)
CO2: 19 MMOL/L (ref 20–31)
CREAT SERPL-MCNC: 0.6 MG/DL (ref 0.5–0.9)
EOSINOPHILS RELATIVE PERCENT: 0 % (ref 1–4)
GFR AFRICAN AMERICAN: >60 ML/MIN
GFR NON-AFRICAN AMERICAN: >60 ML/MIN
GFR SERPL CREATININE-BSD FRML MDRD: ABNORMAL ML/MIN/{1.73_M2}
GLUCOSE BLD-MCNC: 92 MG/DL (ref 70–99)
HCT VFR BLD CALC: 32.3 % (ref 36.3–47.1)
HEMOGLOBIN: 10.1 G/DL (ref 11.9–15.1)
HPV SAMPLE: NORMAL
HPV, GENOTYPE 16: NOT DETECTED
HPV, GENOTYPE 18: NOT DETECTED
HPV, HIGH RISK OTHER: NOT DETECTED
HPV, INTERPRETATION: NORMAL
IMMATURE GRANULOCYTES: 1 %
LYMPHOCYTES # BLD: 11 % (ref 24–43)
MCH RBC QN AUTO: 28 PG (ref 25.2–33.5)
MCHC RBC AUTO-ENTMCNC: 31.3 G/DL (ref 28.4–34.8)
MCV RBC AUTO: 89.5 FL (ref 82.6–102.9)
MONOCYTES # BLD: 6 % (ref 3–12)
NRBC AUTOMATED: 0 PER 100 WBC
PDW BLD-RTO: 14.3 % (ref 11.8–14.4)
PLATELET # BLD: 357 K/UL (ref 138–453)
PMV BLD AUTO: 10.8 FL (ref 8.1–13.5)
POTASSIUM SERPL-SCNC: 4.4 MMOL/L (ref 3.7–5.3)
RBC # BLD: 3.61 M/UL (ref 3.95–5.11)
SEG NEUTROPHILS: 81 % (ref 36–65)
SEGMENTED NEUTROPHILS ABSOLUTE COUNT: 15.37 K/UL (ref 1.5–8.1)
SODIUM BLD-SCNC: 135 MMOL/L (ref 135–144)
SPECIMEN DESCRIPTION: NORMAL
WBC # BLD: 18.9 K/UL (ref 3.5–11.3)

## 2022-03-08 PROCEDURE — 1200000000 HC SEMI PRIVATE

## 2022-03-08 PROCEDURE — 6360000002 HC RX W HCPCS: Performed by: STUDENT IN AN ORGANIZED HEALTH CARE EDUCATION/TRAINING PROGRAM

## 2022-03-08 PROCEDURE — 2580000003 HC RX 258: Performed by: STUDENT IN AN ORGANIZED HEALTH CARE EDUCATION/TRAINING PROGRAM

## 2022-03-08 PROCEDURE — 6360000004 HC RX CONTRAST MEDICATION: Performed by: SURGERY

## 2022-03-08 PROCEDURE — 36415 COLL VENOUS BLD VENIPUNCTURE: CPT

## 2022-03-08 PROCEDURE — 2700000000 HC OXYGEN THERAPY PER DAY

## 2022-03-08 PROCEDURE — 6370000000 HC RX 637 (ALT 250 FOR IP): Performed by: STUDENT IN AN ORGANIZED HEALTH CARE EDUCATION/TRAINING PROGRAM

## 2022-03-08 PROCEDURE — 80048 BASIC METABOLIC PNL TOTAL CA: CPT

## 2022-03-08 PROCEDURE — 74220 X-RAY XM ESOPHAGUS 1CNTRST: CPT

## 2022-03-08 PROCEDURE — 94761 N-INVAS EAR/PLS OXIMETRY MLT: CPT

## 2022-03-08 PROCEDURE — 2500000003 HC RX 250 WO HCPCS: Performed by: STUDENT IN AN ORGANIZED HEALTH CARE EDUCATION/TRAINING PROGRAM

## 2022-03-08 PROCEDURE — 85025 COMPLETE CBC W/AUTO DIFF WBC: CPT

## 2022-03-08 RX ORDER — ONDANSETRON 4 MG/1
4 TABLET, FILM COATED ORAL EVERY 8 HOURS PRN
Qty: 30 TABLET | Refills: 0 | Status: SHIPPED | OUTPATIENT
Start: 2022-03-08

## 2022-03-08 RX ORDER — PROMETHAZINE HYDROCHLORIDE 25 MG/1
25 TABLET ORAL EVERY 6 HOURS PRN
Qty: 30 TABLET | Refills: 0 | Status: SHIPPED | OUTPATIENT
Start: 2022-03-08 | End: 2022-05-17 | Stop reason: ALTCHOICE

## 2022-03-08 RX ORDER — OXYCODONE HYDROCHLORIDE AND ACETAMINOPHEN 5; 325 MG/1; MG/1
1 TABLET ORAL EVERY 6 HOURS PRN
Qty: 28 TABLET | Refills: 0 | Status: SHIPPED | OUTPATIENT
Start: 2022-03-08 | End: 2022-03-15

## 2022-03-08 RX ORDER — CYCLOBENZAPRINE HCL 10 MG
10 TABLET ORAL 3 TIMES DAILY PRN
Qty: 21 TABLET | Refills: 0 | Status: SHIPPED | OUTPATIENT
Start: 2022-03-08 | End: 2022-03-15

## 2022-03-08 RX ADMIN — FAMOTIDINE 20 MG: 10 INJECTION INTRAVENOUS at 21:24

## 2022-03-08 RX ADMIN — IOHEXOL 30 ML: 240 INJECTION, SOLUTION INTRATHECAL; INTRAVASCULAR; INTRAVENOUS; ORAL at 09:34

## 2022-03-08 RX ADMIN — METRONIDAZOLE 500 MG: 500 INJECTION, SOLUTION INTRAVENOUS at 08:26

## 2022-03-08 RX ADMIN — CEFAZOLIN SODIUM 2000 MG: 10 INJECTION, POWDER, FOR SOLUTION INTRAVENOUS at 01:33

## 2022-03-08 RX ADMIN — OXYCODONE HYDROCHLORIDE AND ACETAMINOPHEN 2 TABLET: 5; 325 TABLET ORAL at 17:47

## 2022-03-08 RX ADMIN — HEPARIN SODIUM 5000 UNITS: 5000 INJECTION INTRAVENOUS; SUBCUTANEOUS at 07:33

## 2022-03-08 RX ADMIN — HEPARIN SODIUM 5000 UNITS: 5000 INJECTION INTRAVENOUS; SUBCUTANEOUS at 14:48

## 2022-03-08 RX ADMIN — OXYCODONE HYDROCHLORIDE AND ACETAMINOPHEN 2 TABLET: 5; 325 TABLET ORAL at 03:29

## 2022-03-08 RX ADMIN — HEPARIN SODIUM 5000 UNITS: 5000 INJECTION INTRAVENOUS; SUBCUTANEOUS at 21:24

## 2022-03-08 RX ADMIN — CEFAZOLIN SODIUM 2000 MG: 10 INJECTION, POWDER, FOR SOLUTION INTRAVENOUS at 08:22

## 2022-03-08 RX ADMIN — SODIUM CHLORIDE, PRESERVATIVE FREE 10 ML: 5 INJECTION INTRAVENOUS at 21:25

## 2022-03-08 RX ADMIN — FAMOTIDINE 20 MG: 10 INJECTION INTRAVENOUS at 08:27

## 2022-03-08 RX ADMIN — HYDROMORPHONE HYDROCHLORIDE 1 MG: 1 INJECTION, SOLUTION INTRAMUSCULAR; INTRAVENOUS; SUBCUTANEOUS at 14:49

## 2022-03-08 RX ADMIN — OXYCODONE HYDROCHLORIDE AND ACETAMINOPHEN 2 TABLET: 5; 325 TABLET ORAL at 10:02

## 2022-03-08 ASSESSMENT — PAIN SCALES - GENERAL
PAINLEVEL_OUTOF10: 7
PAINLEVEL_OUTOF10: 10
PAINLEVEL_OUTOF10: 8
PAINLEVEL_OUTOF10: 7
PAINLEVEL_OUTOF10: 8

## 2022-03-08 NOTE — PROGRESS NOTES
Discussed bariatric discharge instructions with patient, allowed time for questions, discussed INDY care, had patient demonstrate IS, lovenox teaching done and patient voices understanding

## 2022-03-08 NOTE — DISCHARGE INSTR - DIET
Good nutrition is important when healing from an illness, injury, or surgery. Follow any nutrition recommendations given to you during your hospital stay. If you were given an oral nutrition supplement while in the hospital, continue to take this supplement at home. You can take it with meals, in-between meals, and/or before bedtime. These supplements can be purchased at most local grocery stores, pharmacies, and chain [employer]-stores. If you have any questions about your diet or nutrition, call the hospital and ask for the dietitian.       Follow Dr Margaret arguelles

## 2022-03-08 NOTE — TELEPHONE ENCOUNTER
----- Message from PRADIP Patterson CNP sent at 3/7/2022  7:50 AM EST -----  +BV- metrogel 1 applicator at HS x 5 nights.

## 2022-03-08 NOTE — PROGRESS NOTES
Bariatric Surgery:  Daily Progress Note          PATIENT NAME: Yaneth Wilcox     TODAY'S DATE: 3/8/2022, 6:30 AM  SUBJECTIVE:     Pt seen and examined at bedside. VSS. Pain controlled. Mild nausea. Tolerating PO. Voiding spontaneously. OBJECTIVE:   VITALS:  /72   Pulse 53   Temp 98.6 °F (37 °C) (Axillary)   Resp 16   Ht 5' 6\" (1.676 m)   Wt (!) 304 lb 8 oz (138.1 kg)   SpO2 100%   BMI 49.15 kg/m²      INTAKE/OUTPUT:      Intake/Output Summary (Last 24 hours) at 3/8/2022 0630  Last data filed at 3/8/2022 4131  Gross per 24 hour   Intake 2312.38 ml   Output 1550 ml   Net 762.38 ml       PHYSICAL EXAM:  General Appearance: awake, alert, oriented, in no acute distress  HEENT:  Normocephalic, atraumatic, mucus membranes moist   Heart: Heart regular rate and rhythm  Lungs: Unlabored  Abdomen: soft, nondistended, minimally ttp at incision sites, INDY with minimal sanguineous output, L drain site dressing in place   Extremities: No cyanosis, pitting edema, rashes noted. Skin: Skin color, texture, turgor normal. No rashes or lesions.     Data:  CBC with Differential:    Lab Results   Component Value Date    WBC 18.9 03/08/2022    RBC 3.61 03/08/2022    HGB 10.1 03/08/2022    HCT 32.3 03/08/2022     03/08/2022    MCV 89.5 03/08/2022    MCH 28.0 03/08/2022    MCHC 31.3 03/08/2022    RDW 14.3 03/08/2022    LYMPHOPCT 11 03/08/2022    MONOPCT 6 03/08/2022    BASOPCT 0 03/08/2022    MONOSABS 1.19 03/08/2022    LYMPHSABS 2.15 03/08/2022    EOSABS <0.03 03/08/2022    BASOSABS 0.03 03/08/2022    DIFFTYPE NOT REPORTED 10/28/2021     BMP:    Lab Results   Component Value Date     03/07/2022    K 4.2 03/07/2022     03/07/2022    CO2 18 03/07/2022    BUN 11 03/07/2022    LABALBU 4.3 10/28/2021    CREATININE 0.70 03/07/2022    CALCIUM 8.6 03/07/2022    GFRAA >60 03/07/2022    LABGLOM >60 03/07/2022    GLUCOSE 137 03/07/2022       ASSESSMENT:  Active Hospital Problems    Diagnosis Date Noted    S/P bariatric surgery [Z98.84] 09/05/2019       27 y.o. female POD#1 s/p sleeve conversion to RNY    Plan:  1. Diet: Rudolpho Lawman clears  2. Analgesia:  Percocet, flexeril, dilaudid prn  3. GI prophylaxis: pepcid  4. DVT prophylaxis:  hep  5. Activity:  Continue to encourage ambulation/activity w/ PT/OT  6. Continue to encourage incentive spirometry  7. Wound care  8.  Follow up esophagram  9. DC planning:  Anticipate home today    Electronically signed by Aashish Dewitt MD  on 3/8/2022 at 6:30 AM

## 2022-03-08 NOTE — PROGRESS NOTES
POST-OPERATIVE PROGRESS NOTE    Patient: Dimas Nice    DATE: 3/7/2022    Surgery: XI ROBOTIC LAPAROSCOPIC GASTRIC SLEEVE TO JUAN-EN-Y GASTRIC BYPASS, EGD, WITH UMBILICAL HERNIA REPAIR    Subjective:   Pt states that she is doing well. Pt's pain at the surgical site has improved and pain is controlled with pain medication. Tolerating bariatric clears and voiding spontaneously. Increasing activity without difficulty. Objective:  Vital signs and Nurse's note reviewed  Post-op vital signs: stable    BP (!) 153/87   Pulse 68   Temp 97.9 °F (36.6 °C) (Oral)   Resp 16   Ht 5' 6\" (1.676 m)   Wt (!) 304 lb 8 oz (138.1 kg)   SpO2 100%   BMI 49.15 kg/m²    Gen:  A&Ox3, NAD  CV: RRR, no m/h/t  Resp: LCTAB, no wheeze or rhonchi  Abd:  Soft, nttp, nd, wounds clean, dry and intact; no erythema induration or exudate  Ext:  Warm, no cyanosis or edema    Assessment:   POD # 0 S/P XI ROBOTIC LAPAROSCOPIC GASTRIC SLEEVE TO JUAN-EN-Y GASTRIC BYPASS, EGD, WITH UMBILICAL HERNIA REPAIR  Recovering well post-op     Plan:    Continue current care  Pain control  Diet: Bariatric clears  Increase activity as tolerated.     Electronically signed by Myron Titus DO  on 3/7/2022 at 11:31 PM

## 2022-03-09 VITALS
TEMPERATURE: 98.6 F | WEIGHT: 293 LBS | OXYGEN SATURATION: 99 % | SYSTOLIC BLOOD PRESSURE: 97 MMHG | DIASTOLIC BLOOD PRESSURE: 52 MMHG | RESPIRATION RATE: 14 BRPM | BODY MASS INDEX: 47.09 KG/M2 | HEIGHT: 66 IN | HEART RATE: 92 BPM

## 2022-03-09 LAB
ABSOLUTE EOS #: 0.04 K/UL (ref 0–0.44)
ABSOLUTE IMMATURE GRANULOCYTE: 0.04 K/UL (ref 0–0.3)
ABSOLUTE LYMPH #: 2.4 K/UL (ref 1.1–3.7)
ABSOLUTE MONO #: 0.76 K/UL (ref 0.1–1.2)
ANION GAP SERPL CALCULATED.3IONS-SCNC: 14 MMOL/L (ref 9–17)
BASOPHILS # BLD: 0 % (ref 0–2)
BASOPHILS ABSOLUTE: <0.03 K/UL (ref 0–0.2)
BUN BLDV-MCNC: 7 MG/DL (ref 6–20)
CALCIUM SERPL-MCNC: 8.2 MG/DL (ref 8.6–10.4)
CHLORIDE BLD-SCNC: 100 MMOL/L (ref 98–107)
CO2: 19 MMOL/L (ref 20–31)
CREAT SERPL-MCNC: 0.64 MG/DL (ref 0.5–0.9)
EOSINOPHILS RELATIVE PERCENT: 0 % (ref 1–4)
GFR AFRICAN AMERICAN: >60 ML/MIN
GFR NON-AFRICAN AMERICAN: >60 ML/MIN
GFR SERPL CREATININE-BSD FRML MDRD: ABNORMAL ML/MIN/{1.73_M2}
GLUCOSE BLD-MCNC: 68 MG/DL (ref 70–99)
HCT VFR BLD CALC: 32.5 % (ref 36.3–47.1)
HEMOGLOBIN: 10 G/DL (ref 11.9–15.1)
IMMATURE GRANULOCYTES: 0 %
LYMPHOCYTES # BLD: 24 % (ref 24–43)
MCH RBC QN AUTO: 27.6 PG (ref 25.2–33.5)
MCHC RBC AUTO-ENTMCNC: 30.8 G/DL (ref 28.4–34.8)
MCV RBC AUTO: 89.8 FL (ref 82.6–102.9)
MONOCYTES # BLD: 8 % (ref 3–12)
NRBC AUTOMATED: 0 PER 100 WBC
PDW BLD-RTO: 14.2 % (ref 11.8–14.4)
PLATELET # BLD: 257 K/UL (ref 138–453)
PMV BLD AUTO: 10.3 FL (ref 8.1–13.5)
POTASSIUM SERPL-SCNC: 3.8 MMOL/L (ref 3.7–5.3)
RBC # BLD: 3.62 M/UL (ref 3.95–5.11)
SEG NEUTROPHILS: 68 % (ref 36–65)
SEGMENTED NEUTROPHILS ABSOLUTE COUNT: 6.86 K/UL (ref 1.5–8.1)
SODIUM BLD-SCNC: 133 MMOL/L (ref 135–144)
WBC # BLD: 10.1 K/UL (ref 3.5–11.3)

## 2022-03-09 PROCEDURE — 2500000003 HC RX 250 WO HCPCS: Performed by: STUDENT IN AN ORGANIZED HEALTH CARE EDUCATION/TRAINING PROGRAM

## 2022-03-09 PROCEDURE — 85025 COMPLETE CBC W/AUTO DIFF WBC: CPT

## 2022-03-09 PROCEDURE — 36415 COLL VENOUS BLD VENIPUNCTURE: CPT

## 2022-03-09 PROCEDURE — 2580000003 HC RX 258: Performed by: STUDENT IN AN ORGANIZED HEALTH CARE EDUCATION/TRAINING PROGRAM

## 2022-03-09 PROCEDURE — 6370000000 HC RX 637 (ALT 250 FOR IP): Performed by: STUDENT IN AN ORGANIZED HEALTH CARE EDUCATION/TRAINING PROGRAM

## 2022-03-09 PROCEDURE — 80048 BASIC METABOLIC PNL TOTAL CA: CPT

## 2022-03-09 PROCEDURE — 6360000002 HC RX W HCPCS: Performed by: STUDENT IN AN ORGANIZED HEALTH CARE EDUCATION/TRAINING PROGRAM

## 2022-03-09 RX ADMIN — SODIUM CHLORIDE, PRESERVATIVE FREE 10 ML: 5 INJECTION INTRAVENOUS at 08:28

## 2022-03-09 RX ADMIN — HEPARIN SODIUM 5000 UNITS: 5000 INJECTION INTRAVENOUS; SUBCUTANEOUS at 14:17

## 2022-03-09 RX ADMIN — OXYCODONE HYDROCHLORIDE AND ACETAMINOPHEN 2 TABLET: 5; 325 TABLET ORAL at 14:17

## 2022-03-09 RX ADMIN — OXYCODONE HYDROCHLORIDE AND ACETAMINOPHEN 2 TABLET: 5; 325 TABLET ORAL at 01:14

## 2022-03-09 RX ADMIN — HEPARIN SODIUM 5000 UNITS: 5000 INJECTION INTRAVENOUS; SUBCUTANEOUS at 05:48

## 2022-03-09 RX ADMIN — FAMOTIDINE 20 MG: 10 INJECTION INTRAVENOUS at 08:29

## 2022-03-09 RX ADMIN — OXYCODONE HYDROCHLORIDE AND ACETAMINOPHEN 2 TABLET: 5; 325 TABLET ORAL at 08:28

## 2022-03-09 ASSESSMENT — PAIN DESCRIPTION - LOCATION: LOCATION: ABDOMEN

## 2022-03-09 ASSESSMENT — PAIN SCALES - GENERAL
PAINLEVEL_OUTOF10: 7
PAINLEVEL_OUTOF10: 7
PAINLEVEL_OUTOF10: 5
PAINLEVEL_OUTOF10: 5

## 2022-03-09 ASSESSMENT — PAIN DESCRIPTION - PAIN TYPE: TYPE: SURGICAL PAIN;ACUTE PAIN

## 2022-03-09 ASSESSMENT — PAIN DESCRIPTION - FREQUENCY: FREQUENCY: CONTINUOUS

## 2022-03-09 NOTE — CARE COORDINATION
Faxed facesheet, order for wheeled walker and face to face to [facility]. Requested to be delivered to patient's hospital room for discharge today.   [address] [facility] call from Felix Burton at [address] [facility] stating they are calling patient's mother ( contact on facesheet) to see if a family member is able to pick walker up from their building today before 5pm.

## 2022-03-09 NOTE — PROGRESS NOTES
Sheeba Medeiros was evaluated today and a DME order was entered for a wheeled walker because she requires this to successfully complete daily living tasks of bathing, toileting, personal cares and ambulating. A wheeled walker is necessary due to the patient's unsteady gait, upper body weakness, and inability to  an ambulation device; and she can ambulate only by pushing a walker instead of a lesser assistive device such as a cane, crutch, or standard walker. The need for this equipment was discussed with the patient and she understands and is in agreement.       Waynetta Cheadle, MD  General Surgery PGY4

## 2022-03-09 NOTE — PLAN OF CARE
Problem: Pain:  Goal: Pain level will decrease  Description: Pain level will decrease  Outcome: Ongoing  Goal: Control of acute pain  Description: Control of acute pain  Outcome: Ongoing  Goal: Control of chronic pain  Description: Control of chronic pain  Outcome: Ongoing     Problem: Falls - Risk of:  Goal: Will remain free from falls  Description: Will remain free from falls  Outcome: Met This Shift  Goal: Absence of physical injury  Description: Absence of physical injury  Outcome: Met This Shift     Problem: Falls - Risk of:  Goal: Will remain free from falls  Description: Will remain free from falls  Outcome: Met This Shift  Goal: Absence of physical injury  Description: Absence of physical injury  Outcome: Met This Shift

## 2022-03-09 NOTE — CARE COORDINATION
Case Management Initial Discharge Plan  Bart Schuler,             Met with:patient to discuss discharge plans. Information verified: address, contacts, phone number, , insurance Yes  Insurance Provider: [employer]     Emergency Contact/Next of Kin name & number: Bart Ghosh mother [phone]  Who are involved in patient's support system? SO, Mother, Friends     PCP: Kristi Galicia, APRN - CNP  Date of last visit: 2 weeks ago       Discharge Planning    Living Arrangements:  Family Members     Home has 1 stories  5 stairs to climb to get into front door, na stairs to climb to reach second floor  Location of bedroom/bathroom in home main level     Patient able to perform ADL's:Independent    Current Services (outpatient & in home) none   DME equipment: na   DME provider: na    Is patient receiving oral anticoagulation therapy? No    If indicated:   Physician managing anticoagulation treatment: na   Where does patient obtain lab work for ATC treatment? na      Potential Assistance Needed:  N/A    Patient agreeable to home care: No  [place] of choice provided:  n/a    Prior SNF/Rehab Placement and Facility: none   Agreeable to SNF/Rehab: No  [place] of choice provided: n/a     Evaluation: no    Expected Discharge date:  22    Patient expects to be discharged to: If home: is the family and/or caregiver wiling & able to provide support at home? Yes   Who will be providing this support? Several family members and friends     Follow Up Appointment: Best Day/ Time:      Transportation provider: friend   Transportation arrangements needed for discharge: No    Readmission Risk              Risk of Unplanned Readmission:  9             Does patient have a readmission risk score greater than 14?: No  If yes, follow-up appointment must be made within 7 days of discharge.      Goals of Care: pain control       Educated patient  on transitional options, provided freedom of choice and are agreeable with plan      Discharge Plan: home independently           Electronically signed by Seema Tucker RN on 3/9/22 at 12:05 PM EST

## 2022-03-09 NOTE — PROGRESS NOTES
CLINICAL PHARMACY NOTE: MEDS TO BEDS    Total # of Prescriptions Filled: 4   The following medications were delivered to the patient:  · LOVENOX 30MG   · PERCOCET 5-325  · PROMETHAZINE 25  · ZOFRAN 4MG   · FLEXERIL       Additional Documentation:    DELIVERED MEDS TO PT , NO COPAY

## 2022-03-09 NOTE — PROGRESS NOTES
Bariatric Surgery:  Daily Progress Note          PATIENT NAME: Binu Book     TODAY'S DATE: 3/9/2022, 6:40 AM  SUBJECTIVE:     Pt seen and examined at bedside. VSS. Pain controlled. Mild nausea when ambulating. Tolerating PO. Voiding spontaneously. INDY with 5cc output overnight. OBJECTIVE:   VITALS:  BP (!) 150/88   Pulse 87   Temp 97.6 °F (36.4 °C) (Oral)   Resp 20   Ht 5' 6\" (1.676 m)   Wt (!) 304 lb 8 oz (138.1 kg)   SpO2 93%   BMI 49.15 kg/m²      INTAKE/OUTPUT:      Intake/Output Summary (Last 24 hours) at 3/9/2022 0640  Last data filed at 3/9/2022 0545  Gross per 24 hour   Intake 2418.05 ml   Output 810 ml   Net 1608.05 ml       PHYSICAL EXAM:  General Appearance: awake, alert, oriented, in no acute distress  HEENT:  Normocephalic, atraumatic, mucus membranes moist   Heart: Heart regular rate and rhythm  Lungs: Unlabored  Abdomen: soft, nondistended, minimally ttp at incision sites, INDY with minimal sanguineous output, L drain site dressing replaced  Extremities: No cyanosis, pitting edema, rashes noted. Skin: Skin color, texture, turgor normal. No rashes or lesions.     Data:  CBC with Differential:    Lab Results   Component Value Date    WBC 18.9 03/08/2022    RBC 3.61 03/08/2022    HGB 10.1 03/08/2022    HCT 32.3 03/08/2022     03/08/2022    MCV 89.5 03/08/2022    MCH 28.0 03/08/2022    MCHC 31.3 03/08/2022    RDW 14.3 03/08/2022    LYMPHOPCT 11 03/08/2022    MONOPCT 6 03/08/2022    BASOPCT 0 03/08/2022    MONOSABS 1.19 03/08/2022    LYMPHSABS 2.15 03/08/2022    EOSABS <0.03 03/08/2022    BASOSABS 0.03 03/08/2022    DIFFTYPE NOT REPORTED 10/28/2021     BMP:    Lab Results   Component Value Date     03/08/2022    K 4.4 03/08/2022     03/08/2022    CO2 19 03/08/2022    BUN 7 03/08/2022    LABALBU 4.3 10/28/2021    CREATININE 0.60 03/08/2022    CALCIUM 8.6 03/08/2022    GFRAA >60 03/08/2022    LABGLOM >60 03/08/2022    GLUCOSE 92 03/08/2022       ASSESSMENT:  Active Hospital Problems    Diagnosis Date Noted    S/P bariatric surgery [Z98.84] 09/05/2019       27 y.o. female POD#2 s/p sleeve conversion to RNY    Plan:  1. Diet: Marc Sacks clears  2. Analgesia:  Percocet, flexeril, dilaudid prn  3. GI prophylaxis: pepcid  4. DVT prophylaxis:  hep  5. Activity:  Continue to encourage ambulation/activity w/ PT/OT  6. Continue to encourage incentive spirometry  7.  Wound care  8. DC planning:  Anticipate home today    Electronically signed by Pankaj Shaver MD  on 3/9/2022 at 6:40 AM

## 2022-03-10 ENCOUNTER — TELEPHONE (OUTPATIENT)
Dept: BARIATRICS/WEIGHT MGMT | Age: 32
End: 2022-03-10

## 2022-03-10 RX ORDER — METRONIDAZOLE 7.5 MG/G
1 GEL VAGINAL DAILY
Qty: 1 EACH | Refills: 0 | Status: SHIPPED | OUTPATIENT
Start: 2022-03-10 | End: 2022-03-15

## 2022-03-10 NOTE — TELEPHONE ENCOUNTER
Post-op outreach call made to pt. Pt reports frequent ambulation around home. Pt wearing abd binder. No complaints of SOB or tachycardia. Laparoscopic incisional sites without problems. Drain is serosanguious    Pt has not had a BM since surgery. not Passing flatus. Agrees to use Milk of Magnesia or Miriaax and monitor for BM. Pt reports urine output of at least 2 times in a 24 hour period. Intake is described as water ,protein,     Rates pain as 4 on a 0-10 scale. Pt using pain medication as directed. Allowed pt the opportunity to ask questions. Reminded patient to reference the patient education materials as needed. Reminded pt that they may phone the office or the \"after hours telephone number\"  that is listed in the patient education binder as needed. Pt verbalized understanding. Pt without concerns at this time     Post op office appt date and time reviewed with pt.     Has Rebecax, is starting tomorrow

## 2022-03-10 NOTE — CARE COORDINATION
03/10/22, 0955 Received call from [facility] supplies, states Rx needs 2 dx for rolling walker or they can't fill it and needs to be attending, not resident. 1528 Received call from Dr. Arpan Montes office, stating needs secondary dx on DME for walker. spoke with Bob and notified will fax over walker order w/secondary dx deconditioning on it. Faxed to [employer], [phone].

## 2022-03-12 ENCOUNTER — HOSPITAL ENCOUNTER (OUTPATIENT)
Dept: ONCOLOGY | Age: 32
Discharge: HOME | End: 2022-03-12
Attending: SURGERY | Admitting: SURGERY
Payer: MEDICARE

## 2022-03-12 VITALS
RESPIRATION RATE: 18 BRPM | SYSTOLIC BLOOD PRESSURE: 124 MMHG | DIASTOLIC BLOOD PRESSURE: 86 MMHG | OXYGEN SATURATION: 98 % | TEMPERATURE: 97.7 F | HEART RATE: 65 BPM

## 2022-03-12 DIAGNOSIS — E86.0 DEHYDRATION: ICD-10-CM

## 2022-03-12 PROCEDURE — 2580000003 HC RX 258: Performed by: SURGERY

## 2022-03-12 PROCEDURE — 96360 HYDRATION IV INFUSION INIT: CPT

## 2022-03-12 PROCEDURE — 96361 HYDRATE IV INFUSION ADD-ON: CPT

## 2022-03-12 RX ORDER — SODIUM CHLORIDE 9 MG/ML
INJECTION, SOLUTION INTRAVENOUS ONCE
Status: COMPLETED | OUTPATIENT
Start: 2022-03-12 | End: 2022-03-12

## 2022-03-12 RX ADMIN — SODIUM CHLORIDE: 9 INJECTION, SOLUTION INTRAVENOUS at 13:15

## 2022-03-12 RX ADMIN — SODIUM CHLORIDE: 9 INJECTION, SOLUTION INTRAVENOUS at 12:08

## 2022-03-14 LAB — CYTOLOGY REPORT: NORMAL

## 2022-03-15 ENCOUNTER — TELEPHONE (OUTPATIENT)
Dept: BARIATRICS/WEIGHT MGMT | Age: 32
End: 2022-03-15

## 2022-03-15 ENCOUNTER — APPOINTMENT (OUTPATIENT)
Dept: CT IMAGING | Age: 32
End: 2022-03-15
Payer: MEDICARE

## 2022-03-15 ENCOUNTER — HOSPITAL ENCOUNTER (EMERGENCY)
Age: 32
Discharge: HOME | End: 2022-03-16
Attending: EMERGENCY MEDICINE
Payer: MEDICARE

## 2022-03-15 VITALS
TEMPERATURE: 99.2 F | WEIGHT: 293 LBS | RESPIRATION RATE: 17 BRPM | BODY MASS INDEX: 49.07 KG/M2 | HEART RATE: 86 BPM | SYSTOLIC BLOOD PRESSURE: 118 MMHG | DIASTOLIC BLOOD PRESSURE: 75 MMHG | OXYGEN SATURATION: 100 %

## 2022-03-15 DIAGNOSIS — E86.0 DEHYDRATION: Primary | ICD-10-CM

## 2022-03-15 LAB
ABSOLUTE EOS #: 0.31 K/UL (ref 0–0.44)
ABSOLUTE IMMATURE GRANULOCYTE: 0.06 K/UL (ref 0–0.3)
ABSOLUTE LYMPH #: 2.71 K/UL (ref 1.1–3.7)
ABSOLUTE MONO #: 0.85 K/UL (ref 0.1–1.2)
ANION GAP SERPL CALCULATED.3IONS-SCNC: 22 MMOL/L (ref 9–17)
BASOPHILS # BLD: 0 % (ref 0–2)
BASOPHILS ABSOLUTE: 0.04 K/UL (ref 0–0.2)
BUN BLDV-MCNC: 10 MG/DL (ref 6–20)
CALCIUM SERPL-MCNC: 9.4 MG/DL (ref 8.6–10.4)
CHLORIDE BLD-SCNC: 99 MMOL/L (ref 98–107)
CO2: 13 MMOL/L (ref 20–31)
CREAT SERPL-MCNC: 0.68 MG/DL (ref 0.5–0.9)
EOSINOPHILS RELATIVE PERCENT: 3 % (ref 1–4)
GFR AFRICAN AMERICAN: >60 ML/MIN
GFR NON-AFRICAN AMERICAN: >60 ML/MIN
GFR SERPL CREATININE-BSD FRML MDRD: ABNORMAL ML/MIN/{1.73_M2}
GLUCOSE BLD-MCNC: 66 MG/DL
GLUCOSE BLD-MCNC: 66 MG/DL (ref 65–105)
GLUCOSE BLD-MCNC: 66 MG/DL (ref 70–99)
GLUCOSE BLD-MCNC: 87 MG/DL (ref 65–105)
HCT VFR BLD CALC: 37.3 % (ref 36.3–47.1)
HEMOGLOBIN: 11.8 G/DL (ref 11.9–15.1)
IMMATURE GRANULOCYTES: 1 %
LYMPHOCYTES # BLD: 27 % (ref 24–43)
MCH RBC QN AUTO: 27.3 PG (ref 25.2–33.5)
MCHC RBC AUTO-ENTMCNC: 31.6 G/DL (ref 28.4–34.8)
MCV RBC AUTO: 86.3 FL (ref 82.6–102.9)
MONOCYTES # BLD: 8 % (ref 3–12)
NRBC AUTOMATED: 0 PER 100 WBC
PDW BLD-RTO: 14.6 % (ref 11.8–14.4)
PLATELET # BLD: 435 K/UL (ref 138–453)
PMV BLD AUTO: 10.1 FL (ref 8.1–13.5)
POTASSIUM SERPL-SCNC: 3.7 MMOL/L (ref 3.7–5.3)
RBC # BLD: 4.32 M/UL (ref 3.95–5.11)
RBC # BLD: ABNORMAL 10*6/UL
SEG NEUTROPHILS: 61 % (ref 36–65)
SEGMENTED NEUTROPHILS ABSOLUTE COUNT: 6.27 K/UL (ref 1.5–8.1)
SODIUM BLD-SCNC: 134 MMOL/L (ref 135–144)
WBC # BLD: 10.2 K/UL (ref 3.5–11.3)

## 2022-03-15 PROCEDURE — 80048 BASIC METABOLIC PNL TOTAL CA: CPT

## 2022-03-15 PROCEDURE — 99283 EMERGENCY DEPT VISIT LOW MDM: CPT

## 2022-03-15 PROCEDURE — 85025 COMPLETE CBC W/AUTO DIFF WBC: CPT

## 2022-03-15 PROCEDURE — 2580000003 HC RX 258: Performed by: EMERGENCY MEDICINE

## 2022-03-15 PROCEDURE — 93005 ELECTROCARDIOGRAM TRACING: CPT

## 2022-03-15 PROCEDURE — 71260 CT THORAX DX C+: CPT

## 2022-03-15 PROCEDURE — 6360000004 HC RX CONTRAST MEDICATION: Performed by: EMERGENCY MEDICINE

## 2022-03-15 PROCEDURE — 82947 ASSAY GLUCOSE BLOOD QUANT: CPT

## 2022-03-15 PROCEDURE — 96361 HYDRATE IV INFUSION ADD-ON: CPT

## 2022-03-15 PROCEDURE — 96360 HYDRATION IV INFUSION INIT: CPT

## 2022-03-15 RX ORDER — DEXTROSE AND SODIUM CHLORIDE 5; .9 G/100ML; G/100ML
INJECTION, SOLUTION INTRAVENOUS
Status: DISCONTINUED
Start: 2022-03-15 | End: 2022-03-16 | Stop reason: HOSPADM

## 2022-03-15 RX ORDER — 0.9 % SODIUM CHLORIDE 0.9 %
1000 INTRAVENOUS SOLUTION INTRAVENOUS ONCE
Status: DISCONTINUED | OUTPATIENT
Start: 2022-03-15 | End: 2022-03-16 | Stop reason: HOSPADM

## 2022-03-15 RX ADMIN — DEXTROSE AND SODIUM CHLORIDE 1000 ML: 5; 900 INJECTION, SOLUTION INTRAVENOUS at 22:05

## 2022-03-15 RX ADMIN — IOPAMIDOL 85 ML: 755 INJECTION, SOLUTION INTRAVENOUS at 21:51

## 2022-03-15 ASSESSMENT — ENCOUNTER SYMPTOMS
COLOR CHANGE: 0
WHEEZING: 0
SHORTNESS OF BREATH: 1
COUGH: 0
DIARRHEA: 0
ABDOMINAL PAIN: 0
NAUSEA: 0
VOMITING: 0
CHEST TIGHTNESS: 0
CONSTIPATION: 0

## 2022-03-15 NOTE — ED PROVIDER NOTES
[facility]  FACULTY HANDOFF       Handoff taken on the following patient from prior Attending Physician: Dr. Allison Chen  Pt Name: Fili Davis  PCP:  PRADIP Masterson CNP    Attestation  I was available and discussed any additional care issues that arose and coordinated the management plans with the resident(s) caring for the patient during my duty period. Any areas of disagreement with resident's documentation of care or procedures are noted on the chart. I was personally present for the key portions of any/all procedures during my duty period. I have documented in the chart those procedures where I was not present during the key portions. CHIEF COMPLAINT       Chief Complaint   Patient presents with    Shortness of Breath         CURRENT MEDICATIONS     Previous Medications  Previous Medications    CYCLOBENZAPRINE (FLEXERIL) 10 MG TABLET    Take 1 tablet by mouth 3 times daily as needed for Muscle spasms    ENOXAPARIN (LOVENOX) 40 MG/0.4ML INJECTION    Inject 0.4 mLs into the skin 2 times daily for 14 days    FAMOTIDINE (PEPCID) 20 MG TABLET    Take 1 tablet by mouth nightly    METRONIDAZOLE (METROGEL) 0.75 % VAGINAL GEL    Place 1 Applicatorful vaginally daily for 5 days    ONDANSETRON (ZOFRAN) 4 MG TABLET    Take 1 tablet by mouth every 8 hours as needed for Nausea or Vomiting    OXYCODONE-ACETAMINOPHEN (PERCOCET) 5-325 MG PER TABLET    Take 1 tablet by mouth every 6 hours as needed for Pain for up to 7 days. PANTOPRAZOLE (PROTONIX) 40 MG TABLET    Take 1 tablet by mouth daily    PROMETHAZINE (PHENERGAN) 25 MG TABLET    Take 1 tablet by mouth every 6 hours as needed for Nausea       Encounter Medications  No orders of the defined types were placed in this encounter. ALLERGIES     is allergic to latex.       RECENT VITALS:   Temp: 99.2 °F (37.3 °C),  Pulse: 105, Resp: 23, BP: 118/75    RADIOLOGY:   CT CHEST PULMONARY EMBOLISM W CONTRAST    (Results Pending) LABS:  Labs Reviewed   CBC WITH AUTO DIFFERENTIAL   BASIC METABOLIC PANEL W/ REFLEX TO MG FOR LOW K   TROPONIN   BRAIN NATRIURETIC PEPTIDE           PLAN/ TASKS OUTSTANDING       Pt with recent bariatric surgery 7 days prior presents with Chest pain and SOB with mild tachycardia. Completing PE workup and ACS screening. Plan for discussion with Bariatric surgery as well.        (Please note that portions of this note were completed with a voice recognition program.  Efforts were made to edit the dictations but occasionally words are mis-transcribed.)    Buck Pena MD, MD,   Attending Emergency Physician       Buck Pena MD  03/15/22 3507

## 2022-03-15 NOTE — ED PROVIDER NOTES
[facility] ED     Emergency Department     Faculty Attestation        I performed a history and physical examination of the patient and discussed management with the resident. I reviewed the residents note and agree with the documented findings and plan of care. Any areas of disagreement are noted on the chart. I was personally present for the key portions of any procedures. I have documented in the chart those procedures where I was not present during the key portions. I have reviewed the emergency nurses triage note. I agree with the chief complaint, past medical history, past surgical history, allergies, medications, social and family history as documented unless otherwise noted below. For mid-level providers such as nurse practitioners as well as physicians assistants:    I have personally seen and evaluated the patient. I find the patient's history and physical exam are consistent with NP/PA documentation. I agree with the care provided, treatment rendered, disposition, & follow-up plan. Additional findings are as noted. Vital Signs: /75   Pulse 105   Temp 99.2 °F (37.3 °C) (Oral)   Resp 23   Wt (!) 304 lb (137.9 kg)   SpO2 98%   BMI 49.07 kg/m²   PCP:  PRADIP Atkinson - CNP    Pertinent Comments:     Shortness of breath recent bariatric surgery complains exertional shortness of breath no cough fevers or chills. No abdominal pain. No history of anemia no blood in the stool. She is tachycardic at rest concern for PE. She is taking Lovenox states she is compliant.       Critical Care  None          Frank Cerda MD    Attending Emergency Medicine Physician              Yazmin De Anda MD  03/15/22 0262

## 2022-03-15 NOTE — ED NOTES
Patient presents with complaint of dyspnea since 3/9/22  Patient had gastric bypass revision on 3/7/22  Patietn verbalizes dyspnea worse with exertion, sometimes even at rest  Patient feels an occasional heaviness to her chest  Patient verbalizes tenderness to bilateral calves  Denies tenderness, edema or inflammation/heat to calves  SaO2 98-99% on room air  No work of breathing noted  On monitor, call light at side, side rail up x2     Cherie Stafford RN  03/15/22 1521 weight-bearing as tolerated

## 2022-03-15 NOTE — ED PROVIDER NOTES
[facility]  ED  Emergency Department Encounter  EmergencyMedicine Resident     Pt Meron Hedrick  MRN: [id number]  Ja[birth year]gfbutch 1990  Date of evaluation: 3/15/22  PCP:  PRADIP Llamas CNP    CHIEF COMPLAINT       Chief Complaint   Patient presents with    Shortness of Breath       HISTORY OF PRESENT ILLNESS  (Location/Symptom, Timing/Onset, Context/Setting, Quality, Duration, Modifying Factors, Severity.)      Dori Douglas is a 27 y.o. female who presents with worsening shortness of breath since she was discharged a couple days after having bariatric surgery. Patient describes dyspnea on exertion. Patient denies any chest pain, abdominal pain, change in urination or bowel habits. Patient denies any lightheadedness, dizziness, fever or chills. Patient denies any cough. Patient states that her drain has been appropriately draining, has a follow-up with her bariatric surgeon. Patient denies any history of blood clots in her legs or lungs. Patient's been utilizing Lovenox shots without any issue since her surgery. PAST MEDICAL / SURGICAL / SOCIAL / FAMILY HISTORY      has a past medical history of Abnormal Pap smear of cervix, Anemia complicating pregnancy in second trimester, COVID-19, Gastroesophageal reflux disease, Morbidly obese ([address] Snores, and Wellness examination. No additional pertinent     has a past surgical history that includes  section (2015); Gastric Band (2019); Upper gastrointestinal endoscopy (N/A, 2019); Upper gastrointestinal endoscopy (N/A, 10/08/2021); Jesus-en-Y Gastric Bypass (2022); and Jesus-en-Y Gastric Bypass (N/A, 3/7/2022).   No additional pertinent    Social History     Socioeconomic History    Marital status: Single     Spouse name: Not on file    Number of children: Not on file    Years of education: Not on file    Highest education level: Not on file   Occupational History    Not on file   Tobacco Use    Smoking status: Never Smoker    Smokeless tobacco: Never Used   Vaping Use    Vaping Use: Never used   Substance and Sexual Activity    Alcohol use: No    Drug use: No    Sexual activity: Yes     Partners: Male     Birth control/protection: I.U.D. Other Topics Concern    Not on file   Social History Narrative    Not on file     Social Determinants of Health     Financial Resource Strain: High Risk    Difficulty of Paying Living Expenses: Very hard   Food Insecurity: No Food Insecurity    Worried About Running Out of Food in the Last Year: Never true    Amber of Food in the Last Year: Never true   Transportation Needs:     Lack of Transportation (Medical): Not on file    Lack of Transportation (Non-Medical):  Not on file   Physical Activity:     Days of Exercise per Week: Not on file    Minutes of Exercise per Session: Not on file   Stress:     Feeling of Stress : Not on file   Social Connections:     Frequency of Communication with Friends and Family: Not on file    Frequency of Social Gatherings with Friends and Family: Not on file    Attends Latter-day Services: Not on file    Active Member of [address] [place] [address] or Organizations: Not on file    Attends Club or Organization Meetings: Not on file    Marital Status: Not on file   Intimate Partner Violence:     Fear of Current or Ex-Partner: Not on file    Emotionally Abused: Not on file    Physically Abused: Not on file    Sexually Abused: Not on file   Housing Stability:     Unable to Pay for Housing in the Last Year: Not on file    Number of [place] in the Last Year: Not on file    Unstable Housing in the Last Year: Not on file       Family History   Problem Relation Age of Onset    Cancer Paternal Grandfather         unknown- Lung     Thyroid Disease Maternal Grandmother     Diabetes Maternal Grandfather     Cancer Maternal Grandfather         throat    Hypertension Maternal Grandfather     Depression Mother     Cancer Maternal Aunt         stomach    Cancer Maternal Uncle         prostate    Stroke Maternal Uncle     Diabetes Maternal Uncle        Allergies:  Latex    Home Medications:  Prior to Admission medications    Medication Sig Start Date End Date Taking? Authorizing Provider   promethazine (PHENERGAN) 25 MG tablet Take 1 tablet by mouth every 6 hours as needed for Nausea 3/8/22   Jose Scruggs MD   enoxaparin (LOVENOX) 40 MG/0.4ML injection Inject 0.4 mLs into the skin 2 times daily for 14 days 3/8/22 3/22/22  Jose Scruggs MD   ondansetron [facility]) 4 MG tablet Take 1 tablet by mouth every 8 hours as needed for Nausea or Vomiting 3/8/22   Jose Scruggs MD   pantoprazole (PROTONIX) 40 MG tablet Take 1 tablet by mouth daily  Patient taking differently: Take 40 mg by mouth daily In am 7/23/21   PRADIP Rubio CNP   famotidine (PEPCID) 20 MG tablet Take 1 tablet by mouth nightly 7/23/21   PRADIP Rubio CNP       REVIEW OF SYSTEMS    (2-9 systems for level 4, 10 or more for level 5)      Review of Systems   Constitutional: Negative for chills and fever. HENT: Negative for congestion. Respiratory: Positive for shortness of breath. Negative for cough, chest tightness and wheezing. Cardiovascular: Negative for chest pain and leg swelling. Gastrointestinal: Negative for abdominal pain, constipation, diarrhea, nausea and vomiting. Endocrine: Negative for polyuria. Genitourinary: Negative for difficulty urinating. Skin: Negative for color change. Neurological: Negative for dizziness, weakness, light-headedness and headaches. Psychiatric/Behavioral: Negative for confusion. PHYSICAL EXAM   (up to 7 for level 4, 8 or more for level 5)      INITIAL VITALS:   /75   Pulse 86   Temp 99.2 °F (37.3 °C) (Oral)   Resp 17   Wt (!) 304 lb (137.9 kg)   SpO2 100%   BMI 49.07 kg/m²     Physical Exam  Constitutional:       Appearance: Normal appearance. She is obese.    HENT:      Head: Normocephalic and atraumatic. Mouth/Throat:      Mouth: Mucous membranes are moist.      Pharynx: Oropharynx is clear. Eyes:      Extraocular Movements: Extraocular movements intact. Conjunctiva/sclera: Conjunctivae normal.   Cardiovascular:      Rate and Rhythm: Normal rate and regular rhythm. Pulses: Normal pulses. Heart sounds: Normal heart sounds. No murmur heard. Pulmonary:      Effort: Pulmonary effort is normal.      Breath sounds: Decreased breath sounds present. No wheezing, rhonchi or rales. Chest:      Chest wall: No tenderness. Abdominal:      General: Bowel sounds are normal. There is no distension. Tenderness: There is no abdominal tenderness. There is no guarding. Musculoskeletal:         General: Normal range of motion. Comments: Range of motion noted to be normal with patient's natural movements   Skin:     General: Skin is warm and dry. Findings: No rash (On exposed skin). Neurological:      General: No focal deficit present. Mental Status: She is alert and oriented to person, place, and time.    Psychiatric:         Mood and Affect: Mood normal.         Behavior: Behavior normal.       DIFFERENTIAL  DIAGNOSIS     PLAN (LABS / IMAGING / EKG):  Orders Placed This Encounter   Procedures    CT CHEST PULMONARY EMBOLISM W CONTRAST    CBC with Auto Differential    Basic Metabolic Panel w/ Reflex to MG    Troponin    Brain Natriuretic Peptide    Inpatient Consult to Bariatrics    POCT glucose    POC Glucose Fingerstick    POC Glucose Fingerstick    EKG 12 Lead    Place CT Compatible peripheral IV       MEDICATIONS ORDERED:  Orders Placed This Encounter   Medications    DISCONTD: 0.9 % sodium chloride bolus    DISCONTD: iopamidol (ISOVUE-370) 76 % injection 75 mL    iopamidol (ISOVUE-370) 76 % injection 85 mL    dextrose 5 % and 0.9 % nacl bolus    DISCONTD: dextrose 5 % and 0.9 % NaCl 5-0.9 % infusion     Dasia Butler: cabinet override DIAGNOSTIC RESULTS / EMERGENCY DEPARTMENT COURSE / MDM   LAB RESULTS:  Results for orders placed or performed during the hospital encounter of 03/15/22   CBC with Auto Differential   Result Value Ref Range    WBC 10.2 3.5 - 11.3 k/uL    RBC 4.32 3.95 - 5.11 m/uL    Hemoglobin 11.8 (L) 11.9 - 15.1 g/dL    Hematocrit 37.3 36.3 - 47.1 %    MCV 86.3 82.6 - 102.9 fL    MCH 27.3 25.2 - 33.5 pg    MCHC 31.6 28.4 - 34.8 g/dL    RDW 14.6 (H) 11.8 - 14.4 %    Platelets 598 651 - 070 k/uL    MPV 10.1 8.1 - 13.5 fL    NRBC Automated 0.0 0.0 per 100 WBC    RBC Morphology ANISOCYTOSIS PRESENT     Seg Neutrophils 61 36 - 65 %    Lymphocytes 27 24 - 43 %    Monocytes 8 3 - 12 %    Eosinophils % 3 1 - 4 %    Basophils 0 0 - 2 %    Immature Granulocytes 1 (H) 0 %    Segs Absolute 6.27 1.50 - 8.10 k/uL    Absolute Lymph # 2.71 1.10 - 3.70 k/uL    Absolute Mono # 0.85 0.10 - 1.20 k/uL    Absolute Eos # 0.31 0.00 - 0.44 k/uL    Basophils Absolute 0.04 0.00 - 0.20 k/uL    Absolute Immature Granulocyte 0.06 0.00 - 0.30 k/uL   Basic Metabolic Panel w/ Reflex to MG   Result Value Ref Range    Glucose 66 (L) 70 - 99 mg/dL    BUN 10 6 - 20 mg/dL    CREATININE 0.68 0.50 - 0.90 mg/dL    Calcium 9.4 8.6 - 10.4 mg/dL    Sodium 134 (L) 135 - 144 mmol/L    Potassium 3.7 3.7 - 5.3 mmol/L    Chloride 99 98 - 107 mmol/L    CO2 13 (L) 20 - 31 mmol/L    Anion Gap 22 (H) 9 - 17 mmol/L    GFR Non-African American >60 >60 mL/min    GFR African American >60 >60 mL/min    GFR Comment         POCT glucose   Result Value Ref Range    Glucose 66 mg/dL   POC Glucose Fingerstick   Result Value Ref Range    POC Glucose 66 65 - 105 mg/dL   POC Glucose Fingerstick   Result Value Ref Range    POC Glucose 87 65 - 105 mg/dL       RADIOLOGY:  CT CHEST PULMONARY EMBOLISM W CONTRAST   Final Result   No evidence of pulmonary embolism or acute pulmonary abnormality.    Subsegmental pulmonary arterial branches cannot be well evaluated due to   suboptimal opacification of pulmonary arteries. .                EKG  EKG Interpretation    Interpreted by emergency department physician    Rhythm: normal sinus   Rate: 100-110  Axis: normal  Ectopy: none  Conduction: normal  ST Segments: flattening in  lateral leads  T Waves: flattening in  v4, v5 and v6 and inversion in  v1, v2 and v3  Q Waves: nonspecific    Clinical Impression: non-specific EKG    Markos eHrrera DO     All EKG's are interpreted by the Emergency Department Physician who either signs or Co-signs this chart in the absence of a cardiologist.    EMERGENCY DEPARTMENT COURSE:  ED Course as of 03/16/22 1432   Tue Mar 15, 2022   1946 Discussed patient with bariatric surgery, they are requesting to give patient couple liters of fluid as she is most likely dehydrated. Plan to give patient also juice for her hypoglycemia. Bariatric surgery okay at this point this time, requesting resident is updated on overall diagnosis prior to discharge. [CR]   2140 Repeat glucose noted to be 66, will give more apple juice. [CR]   Wed Mar 16, 2022   0014 Was able to speak with surgery team.  Patient is cleared from there and for discharge. Patient does feel better will discharge at this time. [ES]      ED Course User Index  [CR] Aguilar Enriquez DO  [ES] Elke Momin MD        PROCEDURES:  none    CONSULTS:  IP CONSULT TO BARIATRICS    MEDICAL DECISION MAKING:  Patient presenting with acute shortness of breath dyspnea on exertion after having bariatric surgery week. Patient will be obese. Patient has elevated risk for according to the Wells criteria for pulmonary embolism. Patient needing CT pulmonary embolism. Patient demonstrates some concerns of hypoglycemia and was given apple juice. Patient was also given multiple liters of fluid for hydration post surgery, patient also given D5 fluid. Patient has CT pulmonary embolism that was noted to be negative.   Patient was a difficult IV and took multiple times to get IV in order to get her IV fluids and to get the pulmonary embolism study. Patient had prolonged stay due to this. Conversation had with bariatric surgery, they recommended giving IV fluids and reevaluation. Patient will follow closely with bariatric surgery. Patient was signed out to Dr. Trena Florez for further evaluation and work-up. CRITICAL CARE:  Please see attending note    FINAL IMPRESSION      1.  Dehydration          DISPOSITION / PLAN     DISPOSITION Decision To Discharge 03/16/2022 12:12:37 AM      PATIENT REFERRED TO:  Tamy Chamberlain, APRN - [facility]  [address]  [place] [address] [facility] [address]  [phone]    Schedule an appointment as soon as possible for a visit       [facility] ED  [address] [facility] [address] [phone]  Go to   As needed    Jaye Villafana DO  [address] [facility] [address]  [phone]    Go to         Kirsten Li:  Discharge Medication List as of 3/16/2022 12:13 AM          Meryle Pries, DO  Emergency Medicine Resident    (Please note that portions of thisnote were completed with a voice recognition program.  Efforts were made to edit the dictations but occasionally words are mis-transcribed.)        Meryle Pries, DO  Resident  03/16/22 1758

## 2022-03-15 NOTE — TELEPHONE ENCOUNTER
Next Visit Date:  3/17/2022    Patient Surgery Date  3/7/22    Type of  Surgery  Revision     Patient calls complaining of  Having sob and tachycardic  when walking , pt. States that when they sit down that they start feeling less tachycardic and are able to catch their breath , but when laying down they notice that are having to take a deeper breath to get a breath in . Pt. States this has been going on since 3/9/22 and does not have pulse ox  at home , pt. Also states they have not had a bm since they were d/c on 3/9/22  pt. States they have not tried any medication to help for bm ,    please advise . Onset: 6 day(s) ago  Timing: constant, intermittent  Severity: Moderate    Progression: stable      Patient advised:   If  Symptoms worsen seek treatment at  Emergency Room. You will receive a call back from the office within 24-48 hours.     Is it ok to leave a message if we call back yes

## 2022-03-16 NOTE — ED PROVIDER NOTES
[facility] ED  Emergency Department  Emergency Medicine Resident Sign-out     Care of Jeffory Hamman was assumed from Dr. Isabel Salgado and is being seen for Shortness of Breath  . The patient's initial evaluation and plan have been discussed with the prior provider who initially evaluated the patient. EMERGENCY DEPARTMENT COURSE / MEDICAL DECISION MAKING:       MEDICATIONS GIVEN:  Orders Placed This Encounter   Medications    0.9 % sodium chloride bolus    DISCONTD: iopamidol (ISOVUE-370) 76 % injection 75 mL    iopamidol (ISOVUE-370) 76 % injection 85 mL    dextrose 5 % and 0.9 % nacl bolus    dextrose 5 % and 0.9 % NaCl 5-0.9 % infusion     Dasia Butler: cabinet override       LABS / RADIOLOGY:     Labs Reviewed   CBC WITH AUTO DIFFERENTIAL - Abnormal; Notable for the following components:       Result Value    Hemoglobin 11.8 (*)     RDW 14.6 (*)     Immature Granulocytes 1 (*)     All other components within normal limits   BASIC METABOLIC PANEL W/ REFLEX TO MG FOR LOW K - Abnormal; Notable for the following components:    Glucose 66 (*)     Sodium 134 (*)     CO2 13 (*)     Anion Gap 22 (*)     All other components within normal limits   POCT GLUCOSE - Normal   TROPONIN   BRAIN NATRIURETIC PEPTIDE   POC GLUCOSE FINGERSTICK   POC GLUCOSE FINGERSTICK       XR CHEST (2 VW)    Result Date: 2/21/2022  EXAMINATION: TWO XRAY VIEWS OF THE CHEST 2/21/2022 1:54 pm COMPARISON: 08/26/2012 HISTORY: ORDERING SYSTEM PROVIDED HISTORY: preop bariatric surgery TECHNOLOGIST PROVIDED HISTORY: preop bariatric surgery FINDINGS: Cardiomediastinal silhouette is normal in size. There is no pleural effusion or pneumothorax. There is minimal elevation of the left hemidiaphragm. No pulmonary consolidation. No acute osseous abnormality. No acute cardiopulmonary abnormality.      FL ESOPHAGRAM    Result Date: 3/8/2022  EXAMINATION: Single CONTRAST ESOPHAGRAM 3/8/2022 9:32 am COMPARISON: None HISTORY: ORDERING SYSTEM PROVIDED HISTORY: sleeve conversion to RNY TECHNOLOGIST PROVIDED HISTORY: sleeve conversion to RNY Reason for Exam: Sleeve conversion to R-N-Y yesterday/ R/O leak/ 30 ml oral Omnipaque TECHNIQUE: The patient was administered 30 cc of Omnipaque 240 orally. Real-time fluoroscopy imaging of the upper abdomen with attention to surgical site performed. FLUOROSCOPY DOSE AND TYPE OR TIME AND EXPOSURES 143.554 FINDINGS: Normal passage of contrast through the esophagus. There is slight delay at the GE junction before passage into the gastric pouch. Small amount of reflux from lower esophagus in the mid esophagus occurred. Findings likely related to postoperative edema. There is prompt passage of contrast into the small bowel. No evidence for extravasation of contrast from the GI tract to indicate postsurgical leakage. No evidence of extravasation of contrast to indicate postoperative leak. RECENT VITALS:     Temp: 99.2 °F (37.3 °C),  Pulse: 86, Resp: 17, BP: 118/75, SpO2: 100 %    This patient is a 27 y.o. Female with dyspnea on exertion, tachycardia and overall feeling unwell. Patient is status post bariatric surgery with Dr. Jory Anderson. Patient was fluid hydrated multiple times as well as Dr. Jory Anderson personally called the emergency department speak with resident who signed out. Patient was hypoglycemic and given multiple juices as well as some D5. Getting a CT PE rule out secondary to patient's tachycardia and tachypnea. Once CT scan is read will consult appropriate services and speak with bariatric services for disposition    ED Course as of 03/16/22 0014   Tu Mar 15, 2022   1946 Discussed patient with bariatric surgery, they are requesting to give patient couple liters of fluid as she is most likely dehydrated. Plan to give patient also juice for her hypoglycemia.   Bariatric surgery okay at this point this time, requesting resident is updated on overall diagnosis prior to discharge. [CR] 2140 Repeat glucose noted to be 66, will give more apple juice. [CR]   Wed Mar 16, 2022   0014 Was able to speak with surgery team.  Patient is cleared from there and for discharge. Patient does feel better will discharge at this time. [ES]      ED Course User Index  [CR] Reilly Calderon DO  [ES] Miladys Chacon MD       OUTSTANDING TASKS / RECOMMENDATIONS:    1. Follow-up CT PE  2. Fluid hydrate  3. Official consult bariatrics  4.  Dispo   FINAL IMPRESSION:     1. Dehydration        DISPOSITION:         DISPOSITION:  [x]  Discharge   []  Transfer -    []  Admission -     []  Against Medical Advice   []  Eloped   FOLLOW-UP: Flori Edgar, APRN - CNP  [address]  [place] [address]  [phone]    Schedule an appointment as soon as possible for a visit       [facility] ED  [address] [place] [address]  [phone]  Go to   As needed    Matt Sargent DO  [address] [facility] [address]   [phone]    Go to        DISCHARGE MEDICATIONS: New Prescriptions    No medications on file           Miladys Chacon MD  Emergency Medicine Resident  [facility]        Miladys Chacon MD  Resident  03/16/22 4647

## 2022-03-17 ENCOUNTER — OFFICE VISIT (OUTPATIENT)
Dept: BARIATRICS/WEIGHT MGMT | Age: 32
End: 2022-03-17

## 2022-03-17 VITALS
TEMPERATURE: 97.1 F | SYSTOLIC BLOOD PRESSURE: 117 MMHG | HEIGHT: 66 IN | BODY MASS INDEX: 46.12 KG/M2 | RESPIRATION RATE: 20 BRPM | HEART RATE: 99 BPM | DIASTOLIC BLOOD PRESSURE: 85 MMHG | WEIGHT: 287 LBS

## 2022-03-17 DIAGNOSIS — Z98.84 S/P BARIATRIC SURGERY: Primary | ICD-10-CM

## 2022-03-17 PROCEDURE — 99024 POSTOP FOLLOW-UP VISIT: CPT | Performed by: SURGERY

## 2022-03-17 RX ORDER — CALCIUM CARBONATE 500(1250)
500 TABLET ORAL DAILY
COMMUNITY

## 2022-03-17 RX ORDER — M-VIT,TX,IRON,MINS/CALC/FOLIC 27MG-0.4MG
1 TABLET ORAL DAILY
COMMUNITY

## 2022-03-17 NOTE — PROGRESS NOTES
Medical Nutrition Therapy  Metabolic and Bariatric surgery  1 week Post operative follow up      Vitals:   Vitals:    03/17/22 0951   BP: 117/85   Site: Left Upper Arm   Position: Sitting   Cuff Size: Large Adult   Pulse: 99   Resp: 20   Temp: 97.1 °F (36.2 °C)   TempSrc: Tympanic   Weight: 287 lb (130.2 kg)   Height: 5' 5.5\" (1.664 m)      Body mass index is 47.03 kg/m². Nutrition Assessment:   PES: Inadequate food and beverage intake r/t WLS as evidenced by loss of excess body weight lost 17 lbs over 1 week.       Goals   60-80gm of protein  48-64oz of fluid       [x] met     []  Not met        Plan:  Pt questions answered re diet advancement;  F/u 5 weeks post-op      Neelam Briceno, MS, RD, LD

## 2022-03-20 NOTE — PROGRESS NOTES
[address] [facility] MIN INVASIVE BARIATRIC SURG  [address]  [address]  [place] [address]  Dept: [phone]    3/17/2022    CC: Post Bariatric surgery    History:  27year old male status post robotic sleeve to bypass revision. Tolerating diet. Nausea and pain resolved. Had a BM. No fevers, chest pain or shortness of breath. Pain controlled.     Review of systems:  General  Negative for: [] Weight Change   [x] Fatigue   [x] Fevers & Chills    [] Appetite change [] Other:    Positive for: [x] Weight Change   [] Fatigue   [] Fevers & Chills    [] Appetite change [] Other:   Cardiac  Negative for: [x] Chest Pain   [x] Difficulty Breathing   [] Leg Cramps [x] Edema of Lower Extremeties    [] Left   [] Right      Positive for: [] Chest Pain   [] Difficulty Breathing   [] Leg Cramps [] Edema of Lower Extremeties    [] Left   [] Right   Pulmonary  Negative for: [x] Shortness of Breath [] Wheeze [x] Cough  [] Calf Pain     Positive for: [] Shortness of Breath [] Wheeze [] Cough  [] Calf Pain   Gastro-Intestinal Negative for: [] Heartburn   [] Reflux   [] Dysphagia   [] Melena   [] BRBPR  [x] Vomiting   [x] Abdominal Pain   [] Diarrhea     [] Constipation  [] Other:     Positive for: [] Heartburn   [] Reflux   [] Dysphagia   [] Melena   [] BRBPR  [] Vomiting   [] Abdominal Pain   [] Diarrhea     [] Constipation  [] Other:    Muskuloskeletal Negative for: [] Joint pain   [] Back pain   [] Knee Pain   [x] Muscle weakness [x] Hernia   [] Edema [] Other:     Positive for: [] Joint pain   [] Back pain   [] Knee Pain   [] Muscle weakness [] Hernia   [] Edema [] Other:    Neurologic Negative for: [x] Syncope   [x] Insomnia   [] Being treated for depression  [] Other:     Positive for: [] Syncope   [] Insomnia   [] Being treated for depression  [] Other:    Skin Negative for: [] Wound:   [] Open   [] Draining   [] Incisional     [x] Rash   [x] Hair Loss  [] Other:     Positive for: [] Wound:   [] Open   [] Draining    [] Incisional  [] Rash   [] Hair Loss  [] Other:        Physical Exam:  /85 (Site: Left Upper Arm, Position: Sitting, Cuff Size: Large Adult)   Pulse 99   Temp 97.1 °F (36.2 °C) (Tympanic)   Resp 20   Ht 5' 5.5\" (1.664 m)   Wt 287 lb (130.2 kg)   BMI 47.03 kg/m²       Constitutional:  Vital signs are normal. The patient appears well-developed and well-nourished. HEENT:   Head: Normocephalic. Atraumatic  Eyes: pupils are equal and reactive. No scleral icterus is present. Neck: No mass and no thyromegaly present. Cardiovascular: Normal rate, regular rhythm, S1 normal and S2 normal.  Radial pulses present   Pulmonary/Chest: Effort normal and breath sounds normal. No retractions  Abdominal: Soft. Normal appearance. There is no organomegaly. No tenderness. There is no rigidity, no rebound, no guarding and no Gao's sign. Musculoskeletal:        Right lower leg: Normal. No tenderness and no edema. Left lower leg: Normal. No tenderness and no edema. Incisions CDI  Neurological: The patient is alert and oriented. Moving all 4 extremities, sensation grossly intact bilateral  Skin: Skin is warm, dry and intact. Psychiatric: The patient has a normal mood and affect.  Speech is normal and behavior is normal. Judgment and thought content normal. Cognition and memory are normal.   Incisions CDI    Assessment:  1 week post bariatric surgery    Plan:  Full liquid diet  Lovenox  All drains removed  Pathology reviewed with patient  No lifting over 20 lbs for 1 month  Ambulation  All questions answered  Dietician visit today  Follow up 1 week  Start Vitamins  Miralax

## 2022-03-22 LAB
EKG ATRIAL RATE: 100 BPM
EKG P AXIS: 36 DEGREES
EKG P-R INTERVAL: 126 MS
EKG Q-T INTERVAL: 360 MS
EKG QRS DURATION: 76 MS
EKG QTC CALCULATION (BAZETT): 464 MS
EKG R AXIS: 36 DEGREES
EKG T AXIS: 35 DEGREES
EKG VENTRICULAR RATE: 100 BPM

## 2022-04-08 ENCOUNTER — OFFICE VISIT (OUTPATIENT)
Dept: BARIATRICS/WEIGHT MGMT | Age: 32
End: 2022-04-08

## 2022-04-08 VITALS
SYSTOLIC BLOOD PRESSURE: 99 MMHG | BODY MASS INDEX: 46.28 KG/M2 | RESPIRATION RATE: 20 BRPM | HEIGHT: 66 IN | HEART RATE: 86 BPM | WEIGHT: 288 LBS | DIASTOLIC BLOOD PRESSURE: 70 MMHG

## 2022-04-08 DIAGNOSIS — Z98.84 S/P BARIATRIC SURGERY: Primary | ICD-10-CM

## 2022-04-08 PROCEDURE — 99024 POSTOP FOLLOW-UP VISIT: CPT | Performed by: SURGERY

## 2022-04-08 RX ORDER — PANTOPRAZOLE SODIUM 20 MG/1
20 TABLET, DELAYED RELEASE ORAL DAILY
Qty: 30 TABLET | Refills: 3 | Status: SHIPPED | OUTPATIENT
Start: 2022-04-08 | End: 2022-05-17 | Stop reason: ALTCHOICE

## 2022-04-08 NOTE — PROGRESS NOTES
Medical Nutrition Therapy  Metabolic and Bariatric surgery  1 month after surgery follow up note           Vitals:   Vitals:    04/08/22 1311   BP: 99/70   Site: Right Upper Arm   Position: Sitting   Cuff Size: Large Adult   Pulse: 86   Resp: 20   Weight: 288 lb (130.6 kg)   Height: 5' 5.5\" (1.664 m)      Body mass index is 47.2 kg/m². Labs reviewed:     Multivitamin/mineral intake: daily  Calcium intake: daily   Other:            Nutrition Assessment:   PES: Inadequate food and beverage intake r/t WLS as evidenced by loss of excess body weight lost 16 lbs over 1 mo.       Goals   60-80gm of protein  48-64oz of fluid     [x] met     []  Not met        Plan:  Questions answered re diet advancement and tolerance  F/u 3 months after surgery         Blanca Mesa, MS, RD, LD

## 2022-04-11 PROBLEM — E86.0 DEHYDRATION: Status: RESOLVED | Noted: 2022-03-12 | Resolved: 2022-04-11

## 2022-05-17 ENCOUNTER — OFFICE VISIT (OUTPATIENT)
Dept: DERMATOLOGY | Age: 32
End: 2022-05-17
Payer: MEDICARE

## 2022-05-17 ENCOUNTER — TELEPHONE (OUTPATIENT)
Dept: OBGYN CLINIC | Age: 32
End: 2022-05-17

## 2022-05-17 VITALS
TEMPERATURE: 97.3 F | OXYGEN SATURATION: 99 % | HEART RATE: 64 BPM | WEIGHT: 282.4 LBS | SYSTOLIC BLOOD PRESSURE: 127 MMHG | BODY MASS INDEX: 45.38 KG/M2 | DIASTOLIC BLOOD PRESSURE: 83 MMHG | HEIGHT: 66 IN

## 2022-05-17 DIAGNOSIS — L21.9 SEBORRHEIC DERMATITIS: ICD-10-CM

## 2022-05-17 DIAGNOSIS — N76.0 ACUTE VAGINITIS: ICD-10-CM

## 2022-05-17 DIAGNOSIS — L70.0 ACNE VULGARIS: Primary | ICD-10-CM

## 2022-05-17 PROCEDURE — 1036F TOBACCO NON-USER: CPT | Performed by: PHYSICIAN ASSISTANT

## 2022-05-17 PROCEDURE — 99204 OFFICE O/P NEW MOD 45 MIN: CPT | Performed by: PHYSICIAN ASSISTANT

## 2022-05-17 PROCEDURE — G8427 DOCREV CUR MEDS BY ELIG CLIN: HCPCS | Performed by: PHYSICIAN ASSISTANT

## 2022-05-17 PROCEDURE — G8417 CALC BMI ABV UP PARAM F/U: HCPCS | Performed by: PHYSICIAN ASSISTANT

## 2022-05-17 RX ORDER — TRIAMCINOLONE ACETONIDE 0.25 MG/G
CREAM TOPICAL
Qty: 80 G | Refills: 1 | Status: SHIPPED | OUTPATIENT
Start: 2022-05-17

## 2022-05-17 RX ORDER — CLINDAMYCIN PHOSPHATE 10 UG/ML
LOTION TOPICAL
Qty: 60 ML | Refills: 3 | Status: SHIPPED | OUTPATIENT
Start: 2022-05-17

## 2022-05-17 RX ORDER — KETOCONAZOLE 20 MG/G
CREAM TOPICAL
Qty: 60 G | Refills: 2 | Status: SHIPPED | OUTPATIENT
Start: 2022-05-17

## 2022-05-17 RX ORDER — LIDOCAINE HYDROCHLORIDE AND EPINEPHRINE 10; 10 MG/ML; UG/ML
1 INJECTION, SOLUTION INFILTRATION; PERINEURAL ONCE
Status: CANCELLED | OUTPATIENT
Start: 2022-05-17 | End: 2022-05-17

## 2022-05-17 RX ORDER — CLINDAMYCIN HYDROCHLORIDE 300 MG/1
300 CAPSULE ORAL 2 TIMES DAILY
Qty: 14 CAPSULE | Refills: 0 | Status: SHIPPED | OUTPATIENT
Start: 2022-05-17 | End: 2022-09-23 | Stop reason: SDUPTHER

## 2022-05-17 NOTE — PROGRESS NOTES
Dermatology Patient Note  [address]  [address] [facility] [address]  Dept: [phone]  Dept Fax: [phone]      VISITDATE: 5/17/2022   REFERRING PROVIDER: No ref. provider found      Shai Dubois is a 27 y.o. female  who presents today in the office for:    Acne (Pt states that her face is dry and breaks out. She uses OTC face cream and products. ) and New Patient      HISTORY OF PRESENT ILLNESS:  As above. No menstrual flares. MEDICAL PROBLEMS:  Patient Active Problem List    Diagnosis Date Noted    H/O abnormal cervical Papanicolaou smear 03/11/2015     Priority: High     3/11/2015 Colpo with physician      Rh+/RI/GBS- 06/23/2015     Priority: Medium    Anal warts 06/16/2015     Priority: Medium    Umbilical hernia without obstruction and without gangrene     Low vitamin B12 level 10/29/2021    Low folate 10/29/2021    Vitamin D deficiency 10/29/2021    History of sleeve gastrectomy 07/23/2021    Morbid obesity ([facility] [address]     Status post gastric surgery     S/P bariatric surgery 09/05/2019    Hiatal hernia with GERD and esophagitis 09/05/2019    H/O LGSIL (1/29/15) 09/28/2015     Negative colpo on 4/7/15         CURRENT MEDICATIONS:   Current Outpatient Medications   Medication Sig Dispense Refill    Cyanocobalamin (B-12 PO) B12   once a day      Calcium Carbonate (CALCIUM 500 PO) Calcium 500   once a day      Thiamine HCl (VITAMIN B-1 PO) Vitamin B1   daily      VITAMIN D PO Vitamin D   once a day      benzoyl peroxide 5 % external liquid Wash affected areas once daily 227 g 3    clindamycin (CLEOCIN T) 1 % lotion Apply to affected areas daily 60 mL 3    tretinoin (RETIN-A) 0.025 % cream Apply pea sized amount to face nightly 45 g 3    ketoconazole (NIZORAL) 2 % cream Apply to rash around nose twice daily as needed.  60 g 2    triamcinolone (KENALOG) 0.025 % cream Apply to rash around nose, twice daily as needed. 80 g 1    Multiple Vitamins-Minerals (THERAPEUTIC MULTIVITAMIN-MINERALS) tablet Take 1 tablet by mouth daily      calcium carbonate (OSCAL) 500 MG TABS tablet Take 500 mg by mouth daily      ondansetron (ZOFRAN) 4 MG tablet Take 1 tablet by mouth every 8 hours as needed for Nausea or Vomiting 30 tablet 0     No current facility-administered medications for this visit. ALLERGIES:   Allergies   Allergen Reactions    Latex Dermatitis       SOCIAL HISTORY:  Social History     Tobacco Use    Smoking status: Never Smoker    Smokeless tobacco: Never Used   Substance Use Topics    Alcohol use: No       Pertinent ROS:  Review of Systems  Skin: Denies any new changing, growing or bleeding lesions or rashes except as described in the HPI   Constitutional: Denies fevers, chills, and malaise. PHYSICAL EXAM:   /83 (Site: Left Upper Arm, Position: Sitting, Cuff Size: Large Adult)   Pulse 64   Temp 97.3 °F (36.3 °C) (Temporal)   Ht 5' 6\" (1.676 m)   Wt 282 lb 6.4 oz (128.1 kg)   SpO2 99%   BMI 45.58 kg/m²     The patient is generally well appearing, well nourished, alert and conversational. Affect is normal.    Cutaneous Exam:  Physical Exam  Face and neck only was examined. Facial covering was removed during examination. Diagnoses/exam findings/medical history pertinent to this visit are listed below:    Assessment:   Diagnosis Orders   1. Acne vulgaris  benzoyl peroxide 5 % external liquid    clindamycin (CLEOCIN T) 1 % lotion    tretinoin (RETIN-A) 0.025 % cream   2. Seborrheic dermatitis  ketoconazole (NIZORAL) 2 % cream    triamcinolone (KENALOG) 0.025 % cream        Plan:  1. Acne vulgaris  - chronic illness with progression and/or exacerbation  - benzoyl peroxide 5 % external liquid; Wash affected areas once daily  Dispense: 227 g; Refill: 3  - clindamycin (CLEOCIN T) 1 % lotion;  Apply to affected areas daily  Dispense: 60 mL; Refill: 3  - tretinoin (RETIN-A) 0.025 % cream; Apply pea sized amount to face nightly  Dispense: 45 g; Refill: 3    2. Seborrheic dermatitis  - chronic illness with progression and/or exacerbation  - ketoconazole (NIZORAL) 2 % cream; Apply to rash around nose twice daily as needed. Dispense: 60 g; Refill: 2  - triamcinolone (KENALOG) 0.025 % cream; Apply to rash around nose, twice daily as needed. Dispense: 80 g; Refill: 1      RTC 3M    Future Appointments   Date Time Provider Moriah Resendez   5/24/2022 10:00 AM SCHEDULE, [facility]  DERMATOLOGY  derm [facility]   6/27/2022  9:15 AM SCHEDULE, [facility] BARIATRIC DIETICIAN bariatric  [address] [place] [address]   8/16/2022 10:15 AM Ion Beltran PA-C  derm [facility]         There are no Patient Instructions on file for this visit.       Electronically signed by Ion Beltran PA-C on 5/17/22 at 3:28 PM EDT

## 2022-05-17 NOTE — TELEPHONE ENCOUNTER
Patient current on her annual, requesting script for BV. Patient states the white pill (flagyl) makes her sick, prefers clindamycin.

## 2022-05-18 ENCOUNTER — TELEPHONE (OUTPATIENT)
Dept: DERMATOLOGY | Age: 32
End: 2022-05-18

## 2022-05-24 ENCOUNTER — NURSE ONLY (OUTPATIENT)
Dept: DERMATOLOGY | Age: 32
End: 2022-05-24

## 2022-05-24 VITALS — TEMPERATURE: 97.3 F

## 2022-05-24 DIAGNOSIS — Z48.02 VISIT FOR SUTURE REMOVAL: Primary | ICD-10-CM

## 2022-05-24 PROCEDURE — 99024 POSTOP FOLLOW-UP VISIT: CPT | Performed by: PHYSICIAN ASSISTANT

## 2022-05-24 NOTE — PROGRESS NOTES
Donnie Laángela presented for suture removal on the left cheek . The biopsy site was well healed. The suture was removed and a band-aid applied. The patient left in good condition.

## 2022-07-11 ENCOUNTER — OFFICE VISIT (OUTPATIENT)
Dept: BARIATRICS/WEIGHT MGMT | Age: 32
End: 2022-07-11
Payer: MEDICARE

## 2022-07-11 VITALS
DIASTOLIC BLOOD PRESSURE: 76 MMHG | HEIGHT: 66 IN | BODY MASS INDEX: 44.84 KG/M2 | WEIGHT: 279 LBS | HEART RATE: 82 BPM | SYSTOLIC BLOOD PRESSURE: 118 MMHG | RESPIRATION RATE: 20 BRPM

## 2022-07-11 DIAGNOSIS — E53.8 LOW FOLATE: ICD-10-CM

## 2022-07-11 DIAGNOSIS — E53.8 LOW VITAMIN B12 LEVEL: Primary | ICD-10-CM

## 2022-07-11 DIAGNOSIS — E66.01 MORBID OBESITY (HCC): ICD-10-CM

## 2022-07-11 DIAGNOSIS — Z98.84 S/P GASTRIC BYPASS: ICD-10-CM

## 2022-07-11 PROCEDURE — G8427 DOCREV CUR MEDS BY ELIG CLIN: HCPCS | Performed by: NURSE PRACTITIONER

## 2022-07-11 PROCEDURE — 1036F TOBACCO NON-USER: CPT | Performed by: NURSE PRACTITIONER

## 2022-07-11 PROCEDURE — G8417 CALC BMI ABV UP PARAM F/U: HCPCS | Performed by: NURSE PRACTITIONER

## 2022-07-11 PROCEDURE — 99213 OFFICE O/P EST LOW 20 MIN: CPT | Performed by: NURSE PRACTITIONER

## 2022-07-11 RX ORDER — PANTOPRAZOLE SODIUM 20 MG/1
20 TABLET, DELAYED RELEASE ORAL DAILY
COMMUNITY

## 2022-07-11 RX ORDER — ISONIAZID 300 MG/1
TABLET ORAL
COMMUNITY

## 2022-07-11 NOTE — PROGRESS NOTES
Post-op Bariatric Surgery Note    Subjective     Patient is 3 months s/p laparoscopic revision of sleeve gastrectomy to gastric bypass, hiatal hernia repair and umbilical hernia repair, down 25 lbs. Overall, doing well. Incisions well healed. Consistent use of MVI and calcium. Physical activity includes walking. No pain or other specific problems or concerns. Allergies: Allergies   Allergen Reactions    Latex Dermatitis       Past Medical History:     Past Medical History:   Diagnosis Date    Abnormal Pap smear of cervix     Anemia complicating pregnancy in second trimester 2015    COVID-19 2021 night sweats, cough, sore throat x 1 week    Gastroesophageal reflux disease 2019    on rx    Morbidly obese ([address]     Snores     no sleep apnea    Wellness examination     [facility] [address]  last seen   . Seeing 22 for clearance.     .    Past Surgical History:  Past Surgical History:   Procedure Laterality Date     SECTION  2015    GASTRIC BAND  2019    GASTRIC SLEEVE    JUAN-EN-Y GASTRIC BYPASS  2022    XI ROBOTIC LAPAROSCOPIC GASTRIC SLEEVE TO JUAN-EN-Y GASTRIC BYPASS, EGD    JUAN-EN-Y GASTRIC BYPASS N/A 3/7/2022    XI ROBOTIC LAPAROSCOPIC GASTRIC SLEEVE TO JUAN-EN-Y GASTRIC BYPASS, EGD, WITH UMBILICAL HERNIA REPAIR performed by Parish Boles DO at [address] N/A 2019    EGD BIOPSY performed by Cierra Driscoll MD at [address] N/A 10/08/2021    EGD BIOPSY performed by Parish Boles DO at [address] [facility].       Family History:  Family History   Problem Relation Age of Onset    Cancer Paternal Grandfather         unknown- Lung     Thyroid Disease Maternal Grandmother     Diabetes Maternal Grandfather     Cancer Maternal Grandfather         throat    Hypertension Maternal Grandfather     Depression Mother     Cancer Maternal Aunt         stomach    Cancer Maternal Uncle         prostate    Stroke Maternal Uncle     Diabetes Maternal Uncle        Social History:  Social History     Socioeconomic History    Marital status: Single     Spouse name: Not on file    Number of children: Not on file    Years of education: Not on file    Highest education level: Not on file   Occupational History    Not on file   Tobacco Use    Smoking status: Never Smoker    Smokeless tobacco: Never Used   Vaping Use    Vaping Use: Never used   Substance and Sexual Activity    Alcohol use: No    Drug use: No    Sexual activity: Yes     Partners: Male     Birth control/protection: I.U.D. Other Topics Concern    Not on file   Social History Narrative    Not on file     Social Determinants of Health     Financial Resource Strain: High Risk    Difficulty of Paying Living Expenses: Very hard   Food Insecurity: No Food Insecurity    Worried About Running Out of Food in the Last Year: Never true    Amber of Food in the Last Year: Never true   Transportation Needs:     Lack of Transportation (Medical): Not on file    Lack of Transportation (Non-Medical):  Not on file   Physical Activity:     Days of Exercise per Week: Not on file    Minutes of Exercise per Session: Not on file   Stress:     Feeling of Stress : Not on file   Social Connections:     Frequency of Communication with Friends and Family: Not on file    Frequency of Social Gatherings with Friends and Family: Not on file    Attends Adventism Services: Not on file    Active Member of Clubs or Organizations: Not on file    Attends Club or Organization Meetings: Not on file    Marital Status: Not on file   Intimate Partner Violence:     Fear of Current or Ex-Partner: Not on file    Emotionally Abused: Not on file    Physically Abused: Not on file    Sexually Abused: Not on file   Housing Stability:     Unable to Pay for Housing in the Last Year: Not on file    Number of Places Lived in the Last Year: Not on file    Unstable Housing in the Last Year: Not on file       Current Medications:  Current Outpatient Medications   Medication Sig Dispense Refill    pantoprazole (PROTONIX) 20 MG tablet Take 20 mg by mouth daily      Cyanocobalamin (B-12 PO) B12   once a day      Calcium Carbonate (CALCIUM 500 PO) Calcium 500   once a day      VITAMIN D PO Vitamin D   once a day      benzoyl peroxide 5 % external liquid Wash affected areas once daily 227 g 3    clindamycin (CLEOCIN T) 1 % lotion Apply to affected areas daily 60 mL 3    triamcinolone (KENALOG) 0.025 % cream Apply to rash around nose, twice daily as needed. 80 g 1    Multiple Vitamins-Minerals (THERAPEUTIC MULTIVITAMIN-MINERALS) tablet Take 1 tablet by mouth daily      calcium carbonate (OSCAL) 500 MG TABS tablet Take 500 mg by mouth daily      ondansetron (ZOFRAN) 4 MG tablet Take 1 tablet by mouth every 8 hours as needed for Nausea or Vomiting 30 tablet 0    isoniazid (NYDRAZID) 300 MG tablet isoniazid 300 mg tablet   Take 1 tablet every day by oral route for 30 days.  Thiamine HCl (VITAMIN B-1 PO) Vitamin B1   daily (Patient not taking: Reported on 7/11/2022)      tretinoin (RETIN-A) 0.025 % cream Apply pea sized amount to face nightly (Patient not taking: Reported on 7/11/2022) 45 g 3    ketoconazole (NIZORAL) 2 % cream Apply to rash around nose twice daily as needed. (Patient not taking: Reported on 7/11/2022) 60 g 2     No current facility-administered medications for this visit. Vital Signs:  /76 (Site: Right Upper Arm, Position: Sitting, Cuff Size: Large Adult)   Pulse 82   Resp 20   Ht 5' 6\" (1.676 m)   Wt 279 lb (126.6 kg)   BMI 45.03 kg/m²     BMI/Height/Weight:  Body mass index is 45.03 kg/m². Review of Systems - A review of systems was performed. All was negative unless otherwise documented in HPI. Constitutional: Negative for fever, chills.    HENT: Negative for trouble swallowing. Eyes: Negative for visual disturbance. Respiratory: Negative for cough, shortness of breath. Cardiovascular: Negative for chest pain and palpitations. Gastrointestinal: Negative for nausea, vomiting, abdominal pain, diarrhea, constipation, blood in stool and abdominal distention. Endocrine: Negative for polydipsia, polyphagia and polyuria. Genitourinary: Negative for dysuria, frequency, hematuria and difficulty urinating. Musculoskeletal: Negative for myalgias, joint swelling. Skin: Negative for pallor and rash. Neurological: Negative for dizziness, tremors, light-headedness and headaches. Psychiatric/Behavioral: Negative for sleep disturbance and dysphoric mood. Objective:      Physical Exam   Vital signs reviewed. General: Well-developed and well-nourished. No acute distress. Skin: Warm, dry and intact. HEENT: Normocephalic. EOMs intact. Conjunctivae normal. Neck supple. Cardiovascular: Normal rate, regular rhythm. Pulmonary/Chest: Normal effort. Lungs clear to auscultation. No rales, rhonchi or wheezing. Abdominal: Positive bowel sounds. Soft, nontender. Nondistended. No rigidity, rebound, or guarding. Musculoskeletal: Movement x4. No edema. Neurological: Gait normal. Alert and oriented to person, place, and time. Psychiatric: Normal mood and affect. Speech and behavior normal. Judgment and thought content normal. Cognition and memory intact. Assessment:       Diagnosis Orders   1. Low vitamin B12 level     2. Morbid obesity (HCC)  Comprehensive Metabolic Panel    TSH    T4, Free    Hemoglobin A1C    Vitamin B12 & Folate    Vitamin B1    Vitamin D 25 Hydroxy    Magnesium    Iron and TIBC    Vitamin A    Lipid Panel    PTH, Intact    CBC with Auto Differential    Zinc    Ferritin   3.  S/P gastric bypass  Comprehensive Metabolic Panel    TSH    T4, Free    Hemoglobin A1C    Vitamin B12 & Folate    Vitamin B1    Vitamin D 25 Hydroxy Magnesium    Iron and TIBC    Vitamin A    Lipid Panel    PTH, Intact    CBC with Auto Differential    Zinc    Ferritin   4. Low folate         Plan:    Dietitian visit today. Patient to continue to increase protein to obtain 60-80g/day, at least 48-64oz of fluid daily, and gradually increase exercise regimen. Bariatric post-op labs ordered. Follow-up  Return in about 3 months (around 10/11/2022). Orders this encounter:  Orders Placed This Encounter   Procedures    Comprehensive Metabolic Panel     Standing Status:   Future     Standing Expiration Date:   7/11/2023    TSH     Standing Status:   Future     Standing Expiration Date:   7/11/2023    T4, Free     Standing Status:   Future     Standing Expiration Date:   7/11/2023    Hemoglobin A1C     Standing Status:   Future     Standing Expiration Date:   7/11/2023    Vitamin B12 & Folate     Standing Status:   Future     Standing Expiration Date:   7/11/2023    Vitamin B1     Standing Status:   Future     Standing Expiration Date:   7/11/2023    Vitamin D 25 Hydroxy     Standing Status:   Future     Standing Expiration Date:   7/11/2023    Magnesium     Standing Status:   Future     Standing Expiration Date:   7/11/2023    Iron and TIBC     Standing Status:   Future     Standing Expiration Date:   7/11/2023     Order Specific Question:   Is Patient Fasting? Answer:   Yes     Order Specific Question:   No of Hours?      Answer:   12 hours    Vitamin A     Standing Status:   Future     Standing Expiration Date:   7/11/2023    Lipid Panel     Standing Status:   Future     Standing Expiration Date:   7/11/2023     Order Specific Question:   Is Patient Fasting?/# of Hours     Answer:   12 hrs    PTH, Intact     Standing Status:   Future     Standing Expiration Date:   7/11/2023    CBC with Auto Differential     Standing Status:   Future     Standing Expiration Date:   7/11/2023    Zinc     Standing Status:   Future     Standing Expiration Date:   7/11/2023    Ferritin     Standing Status:   Future     Standing Expiration Date:   7/11/2023       Prescriptions this encounter:  No orders of the defined types were placed in this encounter.       Electronically signed by:  Estelle Gan CNP

## 2022-07-11 NOTE — PROGRESS NOTES
Medical Nutrition Therapy  Metabolic and Bariatric surgery  3 month follow up        Pt reports:         Vitals:   Vitals:    07/11/22 0853   BP: 118/76   Site: Right Upper Arm   Position: Sitting   Cuff Size: Large Adult   Pulse: 82   Resp: 20   Weight: 279 lb (126.6 kg)   Height: 5' 6\" (1.676 m)      Body mass index is 45.03 kg/m². Labs reviewed:     Multivitamin/mineral intake:2 daily  Calcium intake:   2 daily  Other:            Nutrition Assessment:   PES: Inadequate food and beverage intake r/t WLS as evidenced by loss of excess body weight 25lb. Goals   60-80gm of protein  48-64oz of fluid  Vitamin adherance  Basic adherance to WLS behavious and this document has been scanned into the chart.        [] met     []  Not met        Plan:   F/u 6 months after surgery         Brandon Simons, RD, LD

## 2022-07-24 ENCOUNTER — HOSPITAL ENCOUNTER (EMERGENCY)
Age: 32
Discharge: HOME | End: 2022-07-24
Attending: EMERGENCY MEDICINE
Payer: MEDICARE

## 2022-07-24 VITALS
RESPIRATION RATE: 18 BRPM | BODY MASS INDEX: 45 KG/M2 | WEIGHT: 280 LBS | HEART RATE: 74 BPM | TEMPERATURE: 98.7 F | SYSTOLIC BLOOD PRESSURE: 122 MMHG | DIASTOLIC BLOOD PRESSURE: 86 MMHG | OXYGEN SATURATION: 98 % | HEIGHT: 66 IN

## 2022-07-24 DIAGNOSIS — R42 DIZZINESS: Primary | ICD-10-CM

## 2022-07-24 LAB
ABSOLUTE EOS #: 0.1 K/UL (ref 0–0.4)
ABSOLUTE LYMPH #: 2.3 K/UL (ref 1–4.8)
ABSOLUTE MONO #: 0.6 K/UL (ref 0.1–1.3)
ALBUMIN SERPL-MCNC: 4 G/DL (ref 3.5–5.2)
ALP BLD-CCNC: 111 U/L (ref 35–104)
ALT SERPL-CCNC: 10 U/L (ref 5–33)
ANION GAP SERPL CALCULATED.3IONS-SCNC: 10 MMOL/L (ref 9–17)
AST SERPL-CCNC: 15 U/L
BASOPHILS # BLD: 1 % (ref 0–2)
BASOPHILS ABSOLUTE: 0.1 K/UL (ref 0–0.2)
BILIRUB SERPL-MCNC: 0.29 MG/DL (ref 0.3–1.2)
BUN BLDV-MCNC: 15 MG/DL (ref 6–20)
CALCIUM SERPL-MCNC: 8.4 MG/DL (ref 8.6–10.4)
CHLORIDE BLD-SCNC: 103 MMOL/L (ref 98–107)
CO2: 23 MMOL/L (ref 20–31)
CREAT SERPL-MCNC: 0.85 MG/DL (ref 0.5–0.9)
EOSINOPHILS RELATIVE PERCENT: 1 % (ref 0–4)
GFR AFRICAN AMERICAN: >60 ML/MIN
GFR NON-AFRICAN AMERICAN: >60 ML/MIN
GFR SERPL CREATININE-BSD FRML MDRD: ABNORMAL ML/MIN/{1.73_M2}
GLUCOSE BLD-MCNC: 76 MG/DL (ref 70–99)
HCG QUALITATIVE: NEGATIVE
HCT VFR BLD CALC: 32.6 % (ref 36–46)
HEMOGLOBIN: 10.5 G/DL (ref 12–16)
INFLUENZA A: NOT DETECTED
INFLUENZA B: NOT DETECTED
LYMPHOCYTES # BLD: 36 % (ref 24–44)
MCH RBC QN AUTO: 26.1 PG (ref 26–34)
MCHC RBC AUTO-ENTMCNC: 32.1 G/DL (ref 31–37)
MCV RBC AUTO: 81.3 FL (ref 80–100)
MONOCYTES # BLD: 9 % (ref 1–7)
PDW BLD-RTO: 15.3 % (ref 11.5–14.9)
PLATELET # BLD: 337 K/UL (ref 150–450)
PMV BLD AUTO: 7.6 FL (ref 6–12)
POTASSIUM SERPL-SCNC: 3.8 MMOL/L (ref 3.7–5.3)
RBC # BLD: 4.01 M/UL (ref 4–5.2)
SARS-COV-2 RNA, RT PCR: NOT DETECTED
SEG NEUTROPHILS: 53 % (ref 36–66)
SEGMENTED NEUTROPHILS ABSOLUTE COUNT: 3.5 K/UL (ref 1.3–9.1)
SODIUM BLD-SCNC: 136 MMOL/L (ref 135–144)
SOURCE: NORMAL
SPECIMEN DESCRIPTION: NORMAL
TOTAL PROTEIN: 7.8 G/DL (ref 6.4–8.3)
TROPONIN, HIGH SENSITIVITY: 7 NG/L (ref 0–14)
WBC # BLD: 6.6 K/UL (ref 3.5–11)

## 2022-07-24 PROCEDURE — 84484 ASSAY OF TROPONIN QUANT: CPT

## 2022-07-24 PROCEDURE — 85025 COMPLETE CBC W/AUTO DIFF WBC: CPT

## 2022-07-24 PROCEDURE — 87636 SARSCOV2 & INF A&B AMP PRB: CPT

## 2022-07-24 PROCEDURE — 36415 COLL VENOUS BLD VENIPUNCTURE: CPT

## 2022-07-24 PROCEDURE — 93005 ELECTROCARDIOGRAM TRACING: CPT | Performed by: STUDENT IN AN ORGANIZED HEALTH CARE EDUCATION/TRAINING PROGRAM

## 2022-07-24 PROCEDURE — 80053 COMPREHEN METABOLIC PANEL: CPT

## 2022-07-24 PROCEDURE — 84703 CHORIONIC GONADOTROPIN ASSAY: CPT

## 2022-07-24 PROCEDURE — 99284 EMERGENCY DEPT VISIT MOD MDM: CPT

## 2022-07-24 RX ORDER — DIAPER,BRIEF,INFANT-TODD,DISP
EACH MISCELLANEOUS
Qty: 20 G | Refills: 0 | Status: SHIPPED | OUTPATIENT
Start: 2022-07-24 | End: 2022-07-31

## 2022-07-24 RX ORDER — BLOOD PRESSURE TEST KIT
KIT MISCELLANEOUS
Qty: 1 KIT | Refills: 0 | Status: SHIPPED | OUTPATIENT
Start: 2022-07-24

## 2022-07-24 ASSESSMENT — PAIN SCALES - GENERAL: PAINLEVEL_OUTOF10: 5

## 2022-07-24 ASSESSMENT — PAIN - FUNCTIONAL ASSESSMENT: PAIN_FUNCTIONAL_ASSESSMENT: 0-10

## 2022-07-24 ASSESSMENT — PAIN DESCRIPTION - LOCATION: LOCATION: HEAD

## 2022-07-24 ASSESSMENT — PAIN DESCRIPTION - FREQUENCY: FREQUENCY: INTERMITTENT

## 2022-07-24 ASSESSMENT — PAIN DESCRIPTION - PAIN TYPE: TYPE: ACUTE PAIN

## 2022-07-25 LAB
EKG ATRIAL RATE: 81 BPM
EKG P AXIS: 43 DEGREES
EKG P-R INTERVAL: 142 MS
EKG Q-T INTERVAL: 378 MS
EKG QRS DURATION: 76 MS
EKG QTC CALCULATION (BAZETT): 439 MS
EKG R AXIS: 8 DEGREES
EKG T AXIS: 19 DEGREES
EKG VENTRICULAR RATE: 81 BPM

## 2022-07-25 PROCEDURE — 93010 ELECTROCARDIOGRAM REPORT: CPT | Performed by: INTERNAL MEDICINE

## 2022-07-25 ASSESSMENT — ENCOUNTER SYMPTOMS
RHINORRHEA: 0
ABDOMINAL PAIN: 0
SHORTNESS OF BREATH: 0
NAUSEA: 0
DIARRHEA: 0
SORE THROAT: 0
VOMITING: 0
COUGH: 0

## 2022-07-25 NOTE — ED TRIAGE NOTES
Mode of arrival (squad #, walk in, police, etc) : walk-in        Chief complaint(s): HTN, H/A        Arrival Note (brief scenario, treatment PTA, etc). : pt states she felt dizzy at home, BP assessed and . Patient states number increased from baseline. Add'l c/o include H/A. Denies falls with dizziness. No meds for head pain PTA. No Hx HTN. Denies any ill contacts. Does admit to two episodes of bloody stool appox 2 days ago. Neuros intact in triage. Denies CP and SOB. C= \"Have you ever felt that you should Cut down on your drinking? \"  No  A= \"Have people Annoyed you by criticizing your drinking? \"  No  G= \"Have you ever felt bad or Guilty about your drinking? \"  No  E= \"Have you ever had a drink as an Eye-opener first thing in the morning to steady your nerves or to help a hangover? \"  No      Deferred []      Reason for deferring: N/A    *If yes to two or more: probable alcohol abuse. *

## 2022-07-25 NOTE — DISCHARGE INSTRUCTIONS
Check your blood pressure for the next 2 weeks, every day and write down. Check during different times during the day. You may need to take blood pressure medications for the remainder of your life. Limit the amount of salt that you use. Increase amount of exercise (3 - 4 times a week for minimal of 20 minutes each times). Eat fresh foods. PLEASE RETURN TO THE EMERGENCY DEPARTMENT IMMEDIATELY for worsening symptoms, or if you develop any concerning symptoms such as: high fever not relieved by acetaminophen (Tylenol) and/or ibuprofen (Motrin / Advil), chills, shortness of breath, chest pain, feeling of your heart fluttering or racing, persistent nausea and/or vomiting, vomiting up blood, blood in your stool, numbness, loss of consciousness, weakness or tingling in the arms or legs or change in color of the extremities, changes in mental status, persistent headache, blurry vision, loss of bladder / bowel control, unable to follow up with your physician, or other any other care or concern.

## 2022-07-25 NOTE — ED NOTES
Verbalized understanding of d/c instructions including f/u with PCP and monitoring BP daily for 2 wks and recording readings. Ambulatory out of dept with steady gait.       Kinza Cevallos RN  07/24/22 2245

## 2022-07-25 NOTE — ED PROVIDER NOTES
[address]  eMERGENCY dEPARTMENT eNCOUnter   Attending Attestation     Pt Name: Lou Golden  MRN: [id number]  Jaimegfbutch 1990  Date of evaluation: 7/24/22    History, EXAM, MDM:    Lou Golden is a 27 y.o. female who presents with Hypertension and Dizziness     Concerned about elevated BP at home 260F systolic. Here its 140s. Having body aches, fatigue, intermittent dizziness. She had some blood in her stool a few days ago that has since resolved. She is well appearing. Stable vital signs. Abdomen soft and non-tender. Lungs are clear. EKG shows a sinus rhythm. HR is 81, , QRS 76, , no FRANCESCA, No STD, No TWI, the axis is normal. COVID Negative. Influenza negative. Electrolytes WNL. Hemogllbin 10.5. Suspect a viral syndrome. Recommended close follow up with PCP, return to ED if symptoms worsen, and warning precautions provided. Vitals:   Vitals:    07/24/22 2018   BP: (!) 147/89   Pulse: 80   Resp: 18   Temp: 98.7 °F (37.1 °C)   TempSrc: Oral   SpO2: 100%   Weight: 280 lb (127 kg)   Height: 5' 6\" (1.676 m)     I performed a history and physical examination of the patient and discussed management with the resident. I reviewed the residents note and agree with the documented findings and plan of care. Any areas of disagreement are noted on the chart. I was personally present for the key portions of any procedures. I have documented in the chart those procedures where I was not present during the key portions. I have personally reviewed all images and agree with the resident's interpretation. I have reviewed the emergency nurses triage note. I agree with the chief complaint, past medical history, past surgical history, allergies, medications, social and family history as documented unless otherwise noted below.  Documentation of the HPI, Physical Exam and Medical Decision Making performed by medical students or scribes is based on my personal performance of the HPI, PE and MDM. For Phys Assistant/ Nurse Practitioner cases/documentation I have had a face to face evaluation of this patient and have completed at least one if not all key elements of the E/M (history, physical exam, and MDM). Additional findings are as noted.     Lisa Roberts MD  Attending Emergency  Physician                Lisa Roberts MD  07/25/22 [id number]       Lisa Roberts MD  07/25/22 [id number]

## 2022-07-25 NOTE — ED PROVIDER NOTES
[facility] [address] [facility] ED  Emergency Department Encounter  EmergencyMedicine Resident     Pt Ivett De La Vega  MRN: [id number]  [place] 1990  Date of evaluation: 7/24/22  PCP:  PRADIP Easley CNP    This patient was evaluated in the Emergency Department for symptoms described in the history of present illness. The patient was evaluated in the context of the global COVID-19 pandemic, which necessitated consideration that the patient might be at risk for infection with the SARS-CoV-2 virus that causes COVID-19. Institutional protocols and algorithms that pertain to the evaluation of patients at risk for COVID-19 are in a state of rapid change based on information released by regulatory bodies including the CDC and federal and state organizations. These policies and algorithms were followed during the patient's care in the ED. CHIEF COMPLAINT       Chief Complaint   Patient presents with    Hypertension    Dizziness       HISTORY OF PRESENT ILLNESS  (Location/Symptom, Timing/Onset, Context/Setting, Quality, Duration, Modifying Factors, Severity.)      John Massey is a 27 y.o. female who presents with concern for high blood pressure. Patient states she took her blood pressure at a friend's house and she noted a systolic BP of 420, which was very concerning to her. She does not have any history of hypertension, and believes her blood pressure is normally systolic of 102. She endorses a mild lightheadedness that began gradually yesterday. She also states she had two bowel movements with bright red blood in the bowl and on the toilet paper after she was straining to have a bowel movement two days ago. She denies any repeat episodes of blood in the stool since then. She denies changes in vision, sudden onset of headache, syncope, vertigo, chest pain, shortness of breath, abdominal pain, nausea, vomiting, diarrhea, constipation, numbness, tingling, weakness.     PAST MEDICAL / SURGICAL / SOCIAL / FAMILY HISTORY      has a past medical history of Abnormal Pap smear of cervix, Anemia complicating pregnancy in second trimester, COVID-19, Gastroesophageal reflux disease, Morbidly obese ([address] Snores, and Wellness examination. has a past surgical history that includes  section (2015); Gastric Band (2019); Upper gastrointestinal endoscopy (N/A, 2019); Upper gastrointestinal endoscopy (N/A, 10/08/2021); Jesus-en-Y Gastric Bypass (2022); and Jesus-en-Y Gastric Bypass (N/A, 3/7/2022). Social History     Socioeconomic History    Marital status: Single     Spouse name: Not on file    Number of children: Not on file    Years of education: Not on file    Highest education level: Not on file   Occupational History    Not on file   Tobacco Use    Smoking status: Never    Smokeless tobacco: Never   Vaping Use    Vaping Use: Never used   Substance and Sexual Activity    Alcohol use: No    Drug use: No    Sexual activity: Yes     Partners: Male     Birth control/protection: I.U.D.    Other Topics Concern    Not on file   Social History Narrative    Not on file     Social Determinants of Health     Financial Resource Strain: High Risk    Difficulty of Paying Living Expenses: Very hard   Food Insecurity: No Food Insecurity    Worried About Running Out of Food in the Last Year: Never true    Ran Out of Food in the Last Year: Never true   Transportation Needs: Not on file   Physical Activity: Not on file   Stress: Not on file   Social Connections: Not on file   Intimate Partner Violence: Not on file   Housing Stability: Not on file       Family History   Problem Relation Age of Onset    Cancer Paternal Grandfather         unknown- Lung     Thyroid Disease Maternal Grandmother     Diabetes Maternal Grandfather     Cancer Maternal Grandfather         throat    Hypertension Maternal Grandfather     Depression Mother     Cancer Maternal Aunt         stomach    Cancer Maternal Uncle prostate    Stroke Maternal Uncle     Diabetes Maternal Uncle        Allergies:  Latex    Home Medications:  Prior to Admission medications    Medication Sig Start Date End Date Taking? Authorizing Provider   Blood Pressure KIT Test your blood pressure twice daily and keep a log for your primary care doctor 7/24/22  Yes Messi Bobby MD   hydrocortisone 1 % cream Apply topically 2 -3 times daily for 5 - 7 days. 7/24/22 7/31/22 Yes Messi Bobby MD   isoniazid (NYDRAZID) 300 MG tablet isoniazid 300 mg tablet   Take 1 tablet every day by oral route for 30 days. Patient not taking: Reported on 7/24/2022    Historical Provider, MD   pantoprazole (PROTONIX) 20 MG tablet Take 20 mg by mouth daily  Patient not taking: Reported on 7/24/2022    Historical Provider, MD   Cyanocobalamin (B-12 PO) B12   once a day  Patient not taking: Reported on 7/24/2022    Historical Provider, MD   Calcium Carbonate (CALCIUM 500 PO) Calcium 500   once a day  Patient not taking: Reported on 7/24/2022    Historical Provider, MD   Thiamine HCl (VITAMIN B-1 PO) Vitamin B1   daily  Patient not taking: No sig reported    Historical Provider, MD   VITAMIN D PO Vitamin D   once a day  Patient not taking: Reported on 7/24/2022    Historical Provider, MD   benzoyl peroxide 5 % external liquid Wash affected areas once daily  Patient not taking: Reported on 7/24/2022 5/17/22   Anneliese Parsons PA-C   clindamycin (CLEOCIN T) 1 % lotion Apply to affected areas daily  Patient not taking: Reported on 7/24/2022 5/17/22   Anneliese Parsons PA-C   tretinoin (RETIN-A) 0.025 % cream Apply pea sized amount to face nightly  Patient not taking: No sig reported 5/17/22   Anneliese Parsons PA-C   ketoconazole (NIZORAL) 2 % cream Apply to rash around nose twice daily as needed. Patient not taking: No sig reported 5/17/22   Anneliese Parsons PA-C   triamcinolone (KENALOG) 0.025 % cream Apply to rash around nose, twice daily as needed.   Patient not taking: Reported on 7/24/2022 5/17/22   Medhat Byrne PA-C   Multiple Vitamins-Minerals (THERAPEUTIC MULTIVITAMIN-MINERALS) tablet Take 1 tablet by mouth daily  Patient not taking: Reported on 7/24/2022    Historical Provider, MD   calcium carbonate (OSCAL) 500 MG TABS tablet Take 500 mg by mouth daily  Patient not taking: Reported on 7/24/2022    Historical Provider, MD   ondansetron (ZOFRAN) 4 MG tablet Take 1 tablet by mouth every 8 hours as needed for Nausea or Vomiting  Patient not taking: Reported on 7/24/2022 3/8/22   Wilner Cordero MD       REVIEW OF SYSTEMS    (2-9 systems for level 4, 10 or more for level 5)      Review of Systems   Constitutional:  Negative for activity change, appetite change, chills, fatigue and fever. HENT:  Negative for congestion, rhinorrhea and sore throat. Eyes:  Negative for visual disturbance. Respiratory:  Negative for cough and shortness of breath. Cardiovascular:  Negative for chest pain. Gastrointestinal:  Negative for abdominal pain, diarrhea, nausea and vomiting. Two isolated episodes two days ago of bright red blood in the toilet bowl and on the toilet paper after straining to have a bowel movement, no repeated episodes since then     Genitourinary:  Negative for dysuria and hematuria. Musculoskeletal:  Negative for arthralgias and myalgias. Skin:  Negative for pallor and rash. Neurological:  Positive for light-headedness. Negative for dizziness, tremors, syncope, weakness, numbness and headaches. All other systems reviewed and are negative. PHYSICAL EXAM   (up to 7 for level 4, 8 or more for level 5)      INITIAL VITALS:   /86   Pulse 74   Temp 98.7 °F (37.1 °C) (Oral)   Resp 18   Ht 5' 6\" (1.676 m)   Wt 280 lb (127 kg)   SpO2 98%   BMI 45.19 kg/m²     Physical Exam  Vitals reviewed. Constitutional:       General: She is not in acute distress. Appearance: She is not toxic-appearing. HENT:      Head: Normocephalic and atraumatic. Mouth/Throat:      Mouth: Mucous membranes are moist.      Pharynx: Oropharynx is clear. Eyes:      Extraocular Movements: Extraocular movements intact. Pupils: Pupils are equal, round, and reactive to light. Cardiovascular:      Rate and Rhythm: Normal rate and regular rhythm. Pulses: Normal pulses. Heart sounds: Normal heart sounds. Pulmonary:      Effort: Pulmonary effort is normal.      Breath sounds: Normal breath sounds. No decreased breath sounds, wheezing, rhonchi or rales. Abdominal:      Palpations: Abdomen is soft. Tenderness: There is no abdominal tenderness. There is no guarding or rebound. Musculoskeletal:         General: Normal range of motion. Cervical back: Normal range of motion and neck supple. Right lower leg: No edema. Left lower leg: No edema. Skin:     Capillary Refill: Capillary refill takes less than 2 seconds. Coloration: Skin is not pale. Findings: No rash. Neurological:      General: No focal deficit present. Mental Status: She is alert and oriented to person, place, and time. Motor: No weakness. DIFFERENTIAL  DIAGNOSIS     PLAN (LABS / IMAGING / EKG):  Orders Placed This Encounter   Procedures    COVID-19 & Influenza Combo    CBC with Auto Differential    Comprehensive Metabolic Panel w/ Reflex to MG    Troponin    HCG Qualitative, Serum    EKG 12 Lead       MEDICATIONS ORDERED:  Orders Placed This Encounter   Medications    Blood Pressure KIT     Sig: Test your blood pressure twice daily and keep a log for your primary care doctor     Dispense:  1 kit     Refill:  0    hydrocortisone 1 % cream     Sig: Apply topically 2 -3 times daily for 5 - 7 days.      Dispense:  20 g     Refill:  0       DDX: Differential includes vertigo, pre-syncope (cardiac arrhythmia, valve dysfunction, hypoglycemia, anemia, SAH, Dissection, AAA, PE, vasovagal, orthostatic), unsteady (depression/psychosis, CV, infectious, metabolic, MSK, parkinson's, alzheimer's, ALS)     DIAGNOSTIC RESULTS / EMERGENCY DEPARTMENT COURSE / MDM   LAB RESULTS:  Results for orders placed or performed during the hospital encounter of 07/24/22   COVID-19 & Influenza Combo    Specimen: Nasopharyngeal Swab   Result Value Ref Range    Specimen Description . NASOPHARYNGEAL SWAB     Source . NASOPHARYNGEAL SWAB     SARS-CoV-2 RNA, RT PCR Not Detected Not Detected    INFLUENZA A Not Detected Not Detected    INFLUENZA B Not Detected Not Detected   CBC with Auto Differential   Result Value Ref Range    WBC 6.6 3.5 - 11.0 k/uL    RBC 4.01 4.0 - 5.2 m/uL    Hemoglobin 10.5 (L) 12.0 - 16.0 g/dL    Hematocrit 32.6 (L) 36 - 46 %    MCV 81.3 80 - 100 fL    MCH 26.1 26 - 34 pg    MCHC 32.1 31 - 37 g/dL    RDW 15.3 (H) 11.5 - 14.9 %    Platelets 384 125 - 797 k/uL    MPV 7.6 6.0 - 12.0 fL    Seg Neutrophils 53 36 - 66 %    Lymphocytes 36 24 - 44 %    Monocytes 9 (H) 1 - 7 %    Eosinophils % 1 0 - 4 %    Basophils 1 0 - 2 %    Segs Absolute 3.50 1.3 - 9.1 k/uL    Absolute Lymph # 2.30 1.0 - 4.8 k/uL    Absolute Mono # 0.60 0.1 - 1.3 k/uL    Absolute Eos # 0.10 0.0 - 0.4 k/uL    Basophils Absolute 0.10 0.0 - 0.2 k/uL   Comprehensive Metabolic Panel w/ Reflex to MG   Result Value Ref Range    Glucose 76 70 - 99 mg/dL    BUN 15 6 - 20 mg/dL    Creatinine 0.85 0.50 - 0.90 mg/dL    Calcium 8.4 (L) 8.6 - 10.4 mg/dL    Sodium 136 135 - 144 mmol/L    Potassium 3.8 3.7 - 5.3 mmol/L    Chloride 103 98 - 107 mmol/L    CO2 23 20 - 31 mmol/L    Anion Gap 10 9 - 17 mmol/L    Alkaline Phosphatase 111 (H) 35 - 104 U/L    ALT 10 5 - 33 U/L    AST 15 <32 U/L    Total Bilirubin 0.29 (L) 0.3 - 1.2 mg/dL    Total Protein 7.8 6.4 - 8.3 g/dL    Albumin 4.0 3.5 - 5.2 g/dL    GFR Non-African American >60 >60 mL/min    GFR African American >60 >60 mL/min    GFR Comment         Troponin   Result Value Ref Range    Troponin, High Sensitivity 7 0 - 14 ng/L   HCG Qualitative, Serum   Result Value Ref Range    hCG Qual NEGATIVE NEGATIVE   EKG 12 Lead   Result Value Ref Range    Ventricular Rate 81 BPM    Atrial Rate 81 BPM    P-R Interval 142 ms    QRS Duration 76 ms    Q-T Interval 378 ms    QTc Calculation (Bazett) 439 ms    P Axis 43 degrees    R Axis 8 degrees    T Axis 19 degrees       IMPRESSION: 28 yo F presents with concern about a systolic BP in the 366A and a mild dizziness that began yesterday. BP on arrival 140, over the course of her visit BP decreased to 122. She has never had any diagnosis of hypertension. Patient states she had not had much to drink yesterday. She has otherwise been feeling well and denies any other symptoms. Vitals stable. A&Ox4. No chest pain or SOB, no abdominal pain. No changes in vision. No syncope. Dizziness subsided prior to arrival in the ED. Encouraged patient to check her blood pressure at home and record the readings for her primary care physician if she is concerned about her blood pressure. Provided a script for  blood pressure kit. No blood in the stool itself during bowel movements, no melena. External hemorrhoids visualized on exam. Patient provided with hemorrhoid cream. Encouraged PCP follow up in the next few days. Return precautions discussed. Patient voiced understanding and agreement with plan. EKG  EKG: normal EKG, normal sinus rhythm. All EKG's are interpreted by the Emergency Department Physician who either signs or Co-signs this chart in the absence of a cardiologist.    EMERGENCY DEPARTMENT COURSE:  ED Course as of 07/26/22 2312   Sun Jul 24, 2022   2140 Troponin pending [JG]      ED Course User Index  [JG] Carl Hickey MD         CONSULTS:  None      FINAL IMPRESSION      1.  Dizziness          DISPOSITION / PLAN     DISPOSITION Decision To Discharge 07/24/2022 09:44:20 PM      PATIENT REFERRED TO:  Dorothy Geller, APRN - CNP  [id number]  [facility]  [phone]    Schedule an appointment as soon as possible for a visit in 2

## 2022-08-08 ENCOUNTER — TELEPHONE (OUTPATIENT)
Dept: FAMILY MEDICINE CLINIC | Age: 32
End: 2022-08-08

## 2022-09-15 DIAGNOSIS — M25.531 RIGHT WRIST PAIN: Primary | ICD-10-CM

## 2022-09-23 ENCOUNTER — TELEPHONE (OUTPATIENT)
Dept: OBGYN CLINIC | Age: 32
End: 2022-09-23

## 2022-09-23 DIAGNOSIS — N76.0 ACUTE VAGINITIS: ICD-10-CM

## 2022-09-23 RX ORDER — CLINDAMYCIN HYDROCHLORIDE 300 MG/1
300 CAPSULE ORAL 2 TIMES DAILY
Qty: 14 CAPSULE | Refills: 0 | Status: SHIPPED | OUTPATIENT
Start: 2022-09-23 | End: 2022-10-25 | Stop reason: SDUPTHER

## 2022-09-27 DIAGNOSIS — M25.531 RIGHT WRIST PAIN: Primary | ICD-10-CM

## 2022-10-02 ENCOUNTER — HOSPITAL ENCOUNTER (EMERGENCY)
Age: 32
Discharge: HOME | End: 2022-10-02
Attending: STUDENT IN AN ORGANIZED HEALTH CARE EDUCATION/TRAINING PROGRAM
Payer: OTHER MISCELLANEOUS

## 2022-10-02 VITALS
DIASTOLIC BLOOD PRESSURE: 81 MMHG | TEMPERATURE: 98.6 F | HEIGHT: 66 IN | RESPIRATION RATE: 18 BRPM | HEART RATE: 72 BPM | OXYGEN SATURATION: 99 % | BODY MASS INDEX: 44.68 KG/M2 | SYSTOLIC BLOOD PRESSURE: 129 MMHG | WEIGHT: 278 LBS

## 2022-10-02 DIAGNOSIS — S39.012A STRAIN OF LUMBAR REGION, INITIAL ENCOUNTER: ICD-10-CM
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
## 2022-10-02 PROCEDURE — 99283 EMERGENCY DEPT VISIT LOW MDM: CPT

## 2022-10-02 PROCEDURE — 6370000000 HC RX 637 (ALT 250 FOR IP): Performed by: STUDENT IN AN ORGANIZED HEALTH CARE EDUCATION/TRAINING PROGRAM

## 2022-10-02 RX ORDER — CYCLOBENZAPRINE HCL 10 MG
10 TABLET ORAL NIGHTLY PRN
Qty: 10 TABLET | Refills: 0 | Status: SHIPPED | OUTPATIENT
Start: 2022-10-02 | End: 2022-10-12

## 2022-10-02 RX ORDER — ACETAMINOPHEN 500 MG
1000 TABLET ORAL ONCE
Status: COMPLETED | OUTPATIENT
Start: 2022-10-02 | End: 2022-10-02

## 2022-10-02 RX ORDER — IBUPROFEN 600 MG/1
600 TABLET ORAL ONCE
Status: COMPLETED | OUTPATIENT
Start: 2022-10-02 | End: 2022-10-02

## 2022-10-02 RX ADMIN — ACETAMINOPHEN 1000 MG: 500 TABLET ORAL at 15:07

## 2022-10-02 RX ADMIN — IBUPROFEN 600 MG: 600 TABLET ORAL at 15:07

## 2022-10-02 ASSESSMENT — ENCOUNTER SYMPTOMS
VOICE CHANGE: 0
PHOTOPHOBIA: 0
COUGH: 0
BACK PAIN: 1
NAUSEA: 0
ABDOMINAL PAIN: 0
TROUBLE SWALLOWING: 0
SHORTNESS OF BREATH: 0
VOMITING: 0
RHINORRHEA: 0

## 2022-10-02 ASSESSMENT — PAIN - FUNCTIONAL ASSESSMENT: PAIN_FUNCTIONAL_ASSESSMENT: 0-10

## 2022-10-02 ASSESSMENT — PAIN SCALES - GENERAL
PAINLEVEL_OUTOF10: 6
PAINLEVEL_OUTOF10: 6
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
## 2022-10-24 ENCOUNTER — TELEPHONE (OUTPATIENT)
Dept: OBGYN CLINIC | Age: 32
End: 2022-10-24

## 2022-10-24 RX ORDER — METRONIDAZOLE 500 MG/1
500 TABLET ORAL 2 TIMES DAILY
Qty: 14 TABLET | Refills: 0 | Status: SHIPPED | OUTPATIENT
Start: 2022-10-24 | End: 2022-10-31

## 2022-10-25 ENCOUNTER — TELEPHONE (OUTPATIENT)
Dept: OBGYN CLINIC | Age: 32
End: 2022-10-25

## 2022-10-25 DIAGNOSIS — N76.0 ACUTE VAGINITIS: ICD-10-CM

## 2022-10-25 RX ORDER — CLINDAMYCIN HYDROCHLORIDE 300 MG/1
300 CAPSULE ORAL 2 TIMES DAILY
Qty: 14 CAPSULE | Refills: 0 | Status: SHIPPED | OUTPATIENT
Start: 2022-10-25 | End: 2022-11-01

## 2022-10-26 ENCOUNTER — OFFICE VISIT (OUTPATIENT)
Dept: ORTHOPEDIC SURGERY | Age: 32
End: 2022-10-26
Payer: MEDICARE

## 2022-10-26 VITALS
WEIGHT: 278 LBS | SYSTOLIC BLOOD PRESSURE: 118 MMHG | BODY MASS INDEX: 44.68 KG/M2 | HEIGHT: 66 IN | RESPIRATION RATE: 14 BRPM | DIASTOLIC BLOOD PRESSURE: 79 MMHG | HEART RATE: 67 BPM

## 2022-10-26 DIAGNOSIS — M67.432 GANGLION CYST OF VOLAR ASPECT OF LEFT WRIST: Primary | ICD-10-CM

## 2022-10-26 PROCEDURE — 1036F TOBACCO NON-USER: CPT | Performed by: PHYSICIAN ASSISTANT

## 2022-10-26 PROCEDURE — 20612 ASPIRATE/INJ GANGLION CYST: CPT | Performed by: PHYSICIAN ASSISTANT

## 2022-10-26 PROCEDURE — G8484 FLU IMMUNIZE NO ADMIN: HCPCS | Performed by: PHYSICIAN ASSISTANT

## 2022-10-26 PROCEDURE — G8427 DOCREV CUR MEDS BY ELIG CLIN: HCPCS | Performed by: PHYSICIAN ASSISTANT

## 2022-10-26 PROCEDURE — 99213 OFFICE O/P EST LOW 20 MIN: CPT | Performed by: PHYSICIAN ASSISTANT

## 2022-10-26 PROCEDURE — G8417 CALC BMI ABV UP PARAM F/U: HCPCS | Performed by: PHYSICIAN ASSISTANT

## 2022-10-26 RX ORDER — LIDOCAINE HYDROCHLORIDE 10 MG/ML
2 INJECTION, SOLUTION INFILTRATION; PERINEURAL ONCE
Status: COMPLETED | OUTPATIENT
Start: 2022-10-26 | End: 2022-10-26

## 2022-10-26 RX ORDER — TRIAMCINOLONE ACETONIDE 40 MG/ML
40 INJECTION, SUSPENSION INTRA-ARTICULAR; INTRAMUSCULAR ONCE
Status: COMPLETED | OUTPATIENT
Start: 2022-10-26 | End: 2022-10-26

## 2022-10-26 RX ADMIN — TRIAMCINOLONE ACETONIDE 40 MG: 40 INJECTION, SUSPENSION INTRA-ARTICULAR; INTRAMUSCULAR at 10:55

## 2022-10-26 RX ADMIN — LIDOCAINE HYDROCHLORIDE 2 ML: 10 INJECTION, SOLUTION INFILTRATION; PERINEURAL at 10:55

## 2022-10-26 ASSESSMENT — ENCOUNTER SYMPTOMS
CHEST TIGHTNESS: 0
VOMITING: 0
RESPIRATORY NEGATIVE: 1
COLOR CHANGE: 0
ABDOMINAL DISTENTION: 0
COUGH: 0
ABDOMINAL PAIN: 0
DIARRHEA: 0
APNEA: 0
CONSTIPATION: 0
NAUSEA: 0
SHORTNESS OF BREATH: 0

## 2022-10-26 NOTE — PROGRESS NOTES
[address] AND SPORTS MEDICINE  [address]  [facility] [address]  Dept: [phone]  Dept Fax: [phone]        Right Hand & Wrist   New Patient    Subjective:     Chief Complaint   Patient presents with    Wrist Pain     R Wrist Ganglion Cyst     HPI:     Shama Boland presents with a 1 year history of right wrist pain and swelling. She is right hand dominant. She is complaining of numbness and tingling. She states it hurts when she lifts or bends her wrist.  She states there is no mechanical popping or clicking. The wrist does not get stuck. She has tried home exercises. She was seen 8/11/2021 by Amaury Harley PA-C. She was offered a drainage but refused. They were going to do a surgery to remove the ganglion cyst but she was too busy at work to have it done. ROS:     Review of Systems   Constitutional:  Positive for activity change. Negative for appetite change, fatigue and fever. Respiratory: Negative. Negative for apnea, cough, chest tightness and shortness of breath. Cardiovascular: Negative. Negative for chest pain, palpitations and leg swelling. Gastrointestinal:  Negative for abdominal distention, abdominal pain, constipation, diarrhea, nausea and vomiting. Genitourinary:  Negative for difficulty urinating, dysuria and hematuria. Musculoskeletal:  Positive for arthralgias. Negative for gait problem, joint swelling and myalgias. Skin:  Negative for color change and rash. Neurological:  Positive for numbness. Negative for dizziness, weakness and headaches. Psychiatric/Behavioral:  Negative for sleep disturbance.       Past Medical History:    Past Medical History:   Diagnosis Date    Abnormal Pap smear of cervix 40/51/8815    Anemia complicating pregnancy in second trimester 03/11/2015    COVID-19 12/2021 12/30/2021 night sweats, cough, sore throat x 1 week    Gastroesophageal reflux disease 2019    on rx    Morbidly obese (HCC)     Snores     no sleep apnea    Wellness examination     Monica Nicole APRN- [address]  last seen   . Seeing 22 for clearance. Past Surgical History:    Past Surgical History:   Procedure Laterality Date     SECTION  2015    GASTRIC BAND  2019    GASTRIC SLEEVE    JUAN-EN-Y GASTRIC BYPASS  2022    XI ROBOTIC LAPAROSCOPIC GASTRIC SLEEVE TO JUAN-EN-Y GASTRIC BYPASS, EGD    JUAN-EN-Y GASTRIC BYPASS N/A 3/7/2022    XI ROBOTIC LAPAROSCOPIC GASTRIC SLEEVE TO JUAN-EN-Y GASTRIC BYPASS, EGD, WITH UMBILICAL HERNIA REPAIR performed by Naresh Bloom DO at [facility] [address] 2019    EGD BIOPSY performed by Lei Ryder MD at [address] [facility] 10/08/2021    EGD BIOPSY performed by Naresh Bloom DO at [address] [facility].       CurrentMedications:   Current Outpatient Medications   Medication Sig Dispense Refill    clindamycin (CLEOCIN) 300 MG capsule Take 1 capsule by mouth 2 times daily for 7 days 14 capsule 0    metroNIDAZOLE (FLAGYL) 500 MG tablet Take 1 tablet by mouth 2 times daily for 7 days 14 tablet 0    Blood Pressure KIT Test your blood pressure twice daily and keep a log for your primary care doctor 1 kit 0    isoniazid (NYDRAZID) 300 MG tablet isoniazid 300 mg tablet   Take 1 tablet every day by oral route for 30 days.  (Patient not taking: Reported on 2022)      pantoprazole (PROTONIX) 20 MG tablet Take 20 mg by mouth daily (Patient not taking: Reported on 2022)      Cyanocobalamin (B-12 PO) B12   once a day (Patient not taking: Reported on 2022)      Calcium Carbonate (CALCIUM 500 PO) Calcium 500   once a day (Patient not taking: Reported on 2022)      Thiamine HCl (VITAMIN B-1 PO) Vitamin B1   daily (Patient not taking: No sig reported)      VITAMIN D PO Vitamin D   once a day (Patient not taking: Reported on 7/24/2022)      benzoyl peroxide 5 % external liquid Wash affected areas once daily (Patient not taking: Reported on 7/24/2022) 227 g 3    clindamycin (CLEOCIN T) 1 % lotion Apply to affected areas daily (Patient not taking: Reported on 7/24/2022) 60 mL 3    tretinoin (RETIN-A) 0.025 % cream Apply pea sized amount to face nightly (Patient not taking: No sig reported) 45 g 3    ketoconazole (NIZORAL) 2 % cream Apply to rash around nose twice daily as needed. (Patient not taking: No sig reported) 60 g 2    triamcinolone (KENALOG) 0.025 % cream Apply to rash around nose, twice daily as needed. (Patient not taking: Reported on 7/24/2022) 80 g 1    Multiple Vitamins-Minerals (THERAPEUTIC MULTIVITAMIN-MINERALS) tablet Take 1 tablet by mouth daily (Patient not taking: Reported on 7/24/2022)      calcium carbonate (OSCAL) 500 MG TABS tablet Take 500 mg by mouth daily (Patient not taking: Reported on 7/24/2022)      ondansetron (ZOFRAN) 4 MG tablet Take 1 tablet by mouth every 8 hours as needed for Nausea or Vomiting (Patient not taking: Reported on 7/24/2022) 30 tablet 0     Current Facility-Administered Medications   Medication Dose Route Frequency Provider Last Rate Last Admin    lidocaine 1 % injection 2 mL  2 mL Intra-artICUlar Once Alvaro Foster PA-C        triamcinolone acetonide (KENALOG-40) injection 40 mg  40 mg Intra-artICUlar Once Alvaro Foster PA-C           Allergies:    Latex    Social History:   Social History     Socioeconomic History    Marital status: Single     Spouse name: None    Number of children: None    Years of education: None    Highest education level: None   Tobacco Use    Smoking status: Never    Smokeless tobacco: Never   Vaping Use    Vaping Use: Never used   Substance and Sexual Activity    Alcohol use: No    Drug use: No    Sexual activity: Yes     Partners: Male     Birth control/protection: I.U.D.        Family History:  Family History   Problem Relation Age of Onset    Cancer Paternal Grandfather         unknown- Lung     Thyroid Disease Maternal Grandmother     Diabetes Maternal Grandfather     Cancer Maternal Grandfather         throat    Hypertension Maternal Grandfather     Depression Mother     Cancer Maternal Aunt         stomach    Cancer Maternal Uncle         prostate    Stroke Maternal Uncle     Diabetes Maternal Uncle        Vitals:   /79   Pulse 67   Resp 14   Ht 5' 6\" (1.676 m)   Wt 278 lb (126.1 kg)   BMI 44.87 kg/m²  Body mass index is 44.87 kg/m². Physical Examination:     Orthopedics    GENERAL: Alert and oriented X3 in no acute distress. SKIN: Visual inspection reveals no soft tissue swelling or girish abnormality, no rotational deformity, Intact without lesions or ulcerations. NEURO: Radial, Ulnar and Median nerves are intact to sensory and motor testing. VASC: Capillary refill is less than 3 seconds, no signs of thoracic outlet syndrome, negative Doni's test.    Hand & Wrist Exam    LOCATION: Right Hand & Wrist  MUSC: Good strength is available in these motions. WRIST: No pain to palpation. No girish pain or instability to palpation. WRIST TESTS: Negative finkelstein's, Negative Tinel's test, Negative Phalen's, Negative Suad Compression  ROM: Full flexor and extensor tendon function is intact, 60 degrees of Flexion, 60 degrees of Extension, 90 degrees of Pronation, 90 degrees of Supination  PALPATION: No pain to palpation. She does have a mobile 3 cm x 3 cm mass on the ventral surface of her wrist adjacent to the distal radius  Assessment:     1. Ganglion cyst of volar aspect of left wrist      Procedures:    Procedure: yes    Ganglion Injection    Location: right  wrist ganglion   Procedure: Corticosteroid injection into the ganglion cyst.  The the patient was placed in the prone position on the exam table. The ganglion cyst was identified with ultrasound. A vascular check was done.  The skin was prepped with betadine in a sterile fashion. Utilizing [employer] ultrasound unit with a variable frequency linear transducer for precise placement and clean technique with gloves was used and the ganglion cyst was drained and 3 cc of clear thick fluid was removed after 2 cc of 1% lidocaine was injected to numb the area. Then . 5 cc containing 40mg of kenalog was injected. There was no resistance to the injection. The wound was cleansed and a band-aid was placed. the patient tolerated the procedure without difficulty. Adverse reactions to the injection were discussed with the patient including signs of infection (increasing pain, redness, swelling) and the patient was instructed to call immediately if experiencing any of these symptoms. Radiology:   3 views of the right wrist including AP, lateral and oblique views reveal no fractures, dislocations or other bony abnormalities. Tissue mass on the volar wrist measuring 2-1/2 x 3 cm without change since previous x-ray on 8/11/2021. Impression: Soft tissue mass of the right wrist.   Plan:   Treatment : I reviewed the X-ray with the patient and I informed them that the x-ray does show a mass on the volar aspect of her wrist which is equivalent to where the palpable masses. We discussed the etiologies and natural histories of ganglion cyst of the volar surface of the right wrist. We discussed the various treatment alternatives including anti-inflammatory medications, physical therapy, injections, further imaging studies and as a last result surgery. During today's visit, we discussed that there is a surgery to remove a ganglion cyst.  We could also try aspirating and injecting the cyst and hopefully it goes away. It is very close to her radial artery so ultrasound was used to make sure we did not hit it. The patient has opted for a cortisone injection into the right ganglion cyst to help reduce inflammation and pain.  The injection site should never get red, hot, or swollen and if it does the patient will contact our office right away. The patient may experience a increase in soreness the first 24-48 hours due to a cortisone flair and can take anti-inflammatories for a short period of time to reduce that soreness. The patient should not submerge the injection site in water for a minimum of 24 hours to avoid infection. This means no lakes, pools, ponds, or hot tubs for 24 hours. If the patient is diabetic the injection may increase their blood sugar for up to one week. The patient can do this cortisone injection once every 3 months as needed. If the injections stop working and do not give the patient relief the patient should consider surgical interventions to produce long term relief. If the cyst comes back, then we would discuss a surgery to remove it. The patient will follow-up in 6 to 8 weeks with Dr. Massiel Grubbs if the cyst returns and they can discuss a surgical option. The patient will call the office immediately with any problems. Orders Placed This Encounter   Medications    lidocaine 1 % injection 2 mL    triamcinolone acetonide (KENALOG-40) injection 40 mg         No orders of the defined types were placed in this encounter. This note is created with the assistance of a speech recognition program.  While intending to generate a document that actually reflects the content of the visit, the document can still have some errors including those of syntax and sound a like substitutions which may escape proof reading.   In such instances, actual meaning can be extrapolated by contextual diversion      Electronically signed by Millie Ross PA-C, on 10/26/2022 at 10:11 AM

## 2022-11-11 ENCOUNTER — TELEPHONE (OUTPATIENT)
Dept: BARIATRICS/WEIGHT MGMT | Age: 32
End: 2022-11-11

## 2022-11-11 NOTE — LETTER
[facility]) [facility]   [address] [place] [address]  Phone: [phone]  Fax: [phone]    November 11, 2022    [place] [address]      Dear Irvin Kumar: At [facility]) Weight Management we strive to provide our patients with excellent medical care enhancing their health and quality of life. Our records indicate that you missed your post op appointment with PRADIP Peck CNP on 11/11/2022. Please call the office at [phone] and reschedule this appointment. We do recognize that everyones time is valuable and that appointment time is limited, therefore we do ask that you provide 24 hour notice if you are unable to keep your appointment. Also, our Support Group Meetings are here to serve you. You are welcome to attend the meetings at any time.        Sincerely,      [facility] ([facility]) [facility]

## 2022-11-18 ENCOUNTER — OFFICE VISIT (OUTPATIENT)
Dept: FAMILY MEDICINE CLINIC | Age: 32
End: 2022-11-18
Payer: MEDICARE

## 2022-11-18 VITALS
SYSTOLIC BLOOD PRESSURE: 124 MMHG | DIASTOLIC BLOOD PRESSURE: 84 MMHG | TEMPERATURE: 97.1 F | WEIGHT: 286.2 LBS | OXYGEN SATURATION: 97 % | HEART RATE: 72 BPM | BODY MASS INDEX: 46.19 KG/M2

## 2022-11-18 DIAGNOSIS — G44.329 CHRONIC POST-TRAUMATIC HEADACHE, NOT INTRACTABLE: ICD-10-CM

## 2022-11-18 DIAGNOSIS — G44.229 CHRONIC TENSION-TYPE HEADACHE, NOT INTRACTABLE: Primary | ICD-10-CM

## 2022-11-18 DIAGNOSIS — J06.9 VIRAL URI: ICD-10-CM

## 2022-11-18 DIAGNOSIS — G89.29 CHRONIC MIDLINE LOW BACK PAIN WITHOUT SCIATICA: ICD-10-CM

## 2022-11-18 DIAGNOSIS — M54.50 CHRONIC MIDLINE LOW BACK PAIN WITHOUT SCIATICA: ICD-10-CM
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
## 2022-11-18 PROCEDURE — 99214 OFFICE O/P EST MOD 30 MIN: CPT | Performed by: NURSE PRACTITIONER

## 2022-11-18 PROCEDURE — G8417 CALC BMI ABV UP PARAM F/U: HCPCS | Performed by: NURSE PRACTITIONER

## 2022-11-18 PROCEDURE — G8484 FLU IMMUNIZE NO ADMIN: HCPCS | Performed by: NURSE PRACTITIONER

## 2022-11-18 PROCEDURE — 1036F TOBACCO NON-USER: CPT | Performed by: NURSE PRACTITIONER

## 2022-11-18 PROCEDURE — G8427 DOCREV CUR MEDS BY ELIG CLIN: HCPCS | Performed by: NURSE PRACTITIONER

## 2022-11-18 SDOH — ECONOMIC STABILITY: FOOD INSECURITY: WITHIN THE PAST 12 MONTHS, THE FOOD YOU BOUGHT JUST DIDN'T LAST AND YOU DIDN'T HAVE MONEY TO GET MORE.: NEVER TRUE

## 2022-11-18 SDOH — ECONOMIC STABILITY: FOOD INSECURITY: WITHIN THE PAST 12 MONTHS, YOU WORRIED THAT YOUR FOOD WOULD RUN OUT BEFORE YOU GOT MONEY TO BUY MORE.: NEVER TRUE

## 2022-11-18 ASSESSMENT — PATIENT HEALTH QUESTIONNAIRE - PHQ9
SUM OF ALL RESPONSES TO PHQ9 QUESTIONS 1 & 2: 0
SUM OF ALL RESPONSES TO PHQ QUESTIONS 1-9: 0
1. LITTLE INTEREST OR PLEASURE IN DOING THINGS: 0
2. FEELING DOWN, DEPRESSED OR HOPELESS: 0

## 2022-11-18 ASSESSMENT — ENCOUNTER SYMPTOMS
COLOR CHANGE: 0
SHORTNESS OF BREATH: 0
DIARRHEA: 0
WHEEZING: 0
ALLERGIC/IMMUNOLOGIC NEGATIVE: 1
CHEST TIGHTNESS: 0
EYES NEGATIVE: 1
TROUBLE SWALLOWING: 0
VOMITING: 0
GASTROINTESTINAL NEGATIVE: 1
COUGH: 1
SORE THROAT: 1
BACK PAIN: 1
NAUSEA: 0

## 2022-11-18 ASSESSMENT — SOCIAL DETERMINANTS OF HEALTH (SDOH): HOW HARD IS IT FOR YOU TO PAY FOR THE VERY BASICS LIKE FOOD, HOUSING, MEDICAL CARE, AND HEATING?: NOT HARD AT ALL

## 2022-11-18 NOTE — LETTER
[facility]  [address] [facility] [address] [facility] [address]  Phone: [phone]  Fax: [phone]    PRADIP Barriga CNP        November 18, 2022     Patient: Margot Paul   YOB: 1990   Date of Visit: 11/18/2022       To Whom It May Concern: It is my medical opinion that Margarette Russo off work 11/18/22. If you have any questions or concerns, please don't hesitate to call.     Sincerely,        PRADIP Barriga CNP
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
## 2022-12-07 ENCOUNTER — HOSPITAL ENCOUNTER (OUTPATIENT)
Dept: PHYSICAL THERAPY | Facility: CLINIC | Age: 32
Setting detail: THERAPIES SERIES
Discharge: HOME | End: 2022-12-07

## 2022-12-07 NOTE — FLOWSHEET NOTE
[] [address]  [address]    [phone]  F: [phone]   [x] [address] [facility] [address]   [address]  P: [phone]  F: [phone]  [] [address]  [address]  P: [phone]  F: [phone] [] [address]  P: [phone]  F: [phone]  [] [address]  [address] [facility] [address]   [address]   [place]: [phone]  F: [phone]   [] [address]  [place]: [phone]   F: [phone]     Physical Therapy Cancel/No Show note    Date: 2022  Patient: Susan Anderson  : 1990  MRN: [id number]    Cancels/No Shows to date:     For today's appointment patient:    [x]  Cancelled    [] Rescheduled appointment    [] No-show     Reason given by patient:    []  Patient ill    []  Conflicting appointment    [] No transportation      [] Conflict with work    [] No reason given    [] Weather related    [] [id number]    [] Other:      Comments:        [x] Next appointment was confirmed- rescheduled eval     Electronically signed by: Paty Hernandez PT

## 2022-12-08 ENCOUNTER — TELEPHONE (OUTPATIENT)
Dept: OBGYN CLINIC | Age: 32
End: 2022-12-08

## 2022-12-08 DIAGNOSIS — N76.0 ACUTE VAGINITIS: ICD-10-CM

## 2022-12-08 RX ORDER — CLINDAMYCIN HYDROCHLORIDE 300 MG/1
300 CAPSULE ORAL 2 TIMES DAILY
Qty: 14 CAPSULE | Refills: 0 | Status: SHIPPED | OUTPATIENT
Start: 2022-12-08 | End: 2022-12-15

## 2022-12-08 NOTE — TELEPHONE ENCOUNTER
Pt called in having symptoms of BV , pt is asking for a script to be called into [facility]. AT [place], pt states she does not do flagyl , she does cleocin

## 2023-01-10 ENCOUNTER — OFFICE VISIT (OUTPATIENT)
Dept: FAMILY MEDICINE CLINIC | Age: 33
End: 2023-01-10
Payer: MEDICARE

## 2023-01-10 VITALS
DIASTOLIC BLOOD PRESSURE: 78 MMHG | WEIGHT: 284.8 LBS | HEART RATE: 70 BPM | SYSTOLIC BLOOD PRESSURE: 118 MMHG | BODY MASS INDEX: 45.97 KG/M2 | OXYGEN SATURATION: 100 % | TEMPERATURE: 97.1 F

## 2023-01-10 DIAGNOSIS — L80 VITILIGO: Primary | ICD-10-CM

## 2023-01-10 DIAGNOSIS — J06.9 VIRAL URI: ICD-10-CM

## 2023-01-10 PROCEDURE — 99213 OFFICE O/P EST LOW 20 MIN: CPT | Performed by: NURSE PRACTITIONER

## 2023-01-10 ASSESSMENT — PATIENT HEALTH QUESTIONNAIRE - PHQ9
2. FEELING DOWN, DEPRESSED OR HOPELESS: 0
SUM OF ALL RESPONSES TO PHQ QUESTIONS 1-9: 0
1. LITTLE INTEREST OR PLEASURE IN DOING THINGS: 0
SUM OF ALL RESPONSES TO PHQ9 QUESTIONS 1 & 2: 0
SUM OF ALL RESPONSES TO PHQ QUESTIONS 1-9: 0

## 2023-01-10 ASSESSMENT — ENCOUNTER SYMPTOMS
SORE THROAT: 1
COUGH: 1
DIARRHEA: 0
NAUSEA: 0
WHEEZING: 0
ABDOMINAL PAIN: 0
GASTROINTESTINAL NEGATIVE: 1
SHORTNESS OF BREATH: 0
SINUS PRESSURE: 0
CHEST TIGHTNESS: 0
SINUS PAIN: 0
COLOR CHANGE: 0
ALLERGIC/IMMUNOLOGIC NEGATIVE: 1
VOMITING: 0
RHINORRHEA: 1
TROUBLE SWALLOWING: 1

## 2023-01-10 NOTE — PROGRESS NOTES
[facility]  [address] [facility]  Dept: [phone]    Sonal Rob is a 34 y.o. female who presents today for her medical conditions/complaintsas noted below. Sonal Rob is here today c/o Skin Problem (Lightening of skin right wrist) and Blood Work    Past Medical History:   Diagnosis Date    Abnormal Pap smear of cervix     Anemia complicating pregnancy in second trimester 2015    COVID-19 2021 night sweats, cough, sore throat x 1 week    Gastroesophageal reflux disease 2019    on rx    Morbidly obese ([address]     Snores     no sleep apnea    Wellness examination     [facility] APRN-[facility] [address]  last seen   . Seeing 22 for clearance.        Past Surgical History:   Procedure Laterality Date     SECTION  2015    GASTRIC BAND  2019    GASTRIC SLEEVE    JUAN-EN-Y GASTRIC BYPASS  2022    XI ROBOTIC LAPAROSCOPIC GASTRIC SLEEVE TO JUAN-EN-Y GASTRIC BYPASS, EGD    JUAN-EN-Y GASTRIC BYPASS N/A 3/7/2022    XI ROBOTIC LAPAROSCOPIC GASTRIC SLEEVE TO JUAN-EN-Y GASTRIC BYPASS, EGD, WITH UMBILICAL HERNIA REPAIR performed by Haydee Avalos DO at [address] N/A 2019    EGD BIOPSY performed by Laura Pino MD at [address] N/A 10/08/2021    [facility]       Family History   Problem Relation Age of Onset    Cancer Paternal Grandfather         unknown- Lung     Thyroid Disease Maternal Grandmother     Diabetes Maternal Grandfather     Cancer Maternal Grandfather         throat    Hypertension Maternal Grandfather     Depression Mother     Cancer Maternal Aunt         stomach    Cancer Maternal Uncle         prostate    Stroke Maternal Uncle     Diabetes Maternal Uncle        Social History     Tobacco Use    Smoking status: Never    Smokeless tobacco: Never   Substance Use Topics    Alcohol use: No      Current Outpatient Medications   Medication Sig Dispense Refill    pantoprazole (PROTONIX) 20 MG tablet Take 20 mg by mouth daily      Calcium Carbonate (CALCIUM 500 PO)       VITAMIN D PO       Multiple Vitamins-Minerals (THERAPEUTIC MULTIVITAMIN-MINERALS) tablet Take 1 tablet by mouth daily       No current facility-administered medications for this visit. Allergies   Allergen Reactions    Latex Dermatitis         HPI:     URI   This is a new problem. The current episode started in the past 7 days. The problem has been unchanged. There has been no fever. Associated symptoms include congestion, coughing, rhinorrhea and a sore throat. Pertinent negatives include no abdominal pain, chest pain, diarrhea, dysuria, ear pain, headaches, joint pain, joint swelling, nausea, neck pain, plugged ear sensation, rash, sinus pain, sneezing, vomiting or wheezing. Treatments tried: Nyquil. The treatment provided mild relief. Reports discoloration of skin on R wrist  Rash is no where else  Hasn't tried anything for sx     Health Maintenance:      Subjective:     Review of Systems   Constitutional: Negative. Negative for appetite change, chills, diaphoresis, fatigue, fever and unexpected weight change. HENT:  Positive for congestion, rhinorrhea, sore throat and trouble swallowing. Negative for ear discharge, ear pain, sinus pressure, sinus pain and sneezing. Respiratory:  Positive for cough. Negative for chest tightness, shortness of breath and wheezing. Cardiovascular: Negative. Negative for chest pain and leg swelling. Gastrointestinal: Negative. Negative for abdominal pain, diarrhea, nausea and vomiting. Endocrine: Negative. Genitourinary: Negative. Negative for difficulty urinating and dysuria. Musculoskeletal: Negative. Negative for joint pain and neck pain. Skin: Negative. Negative for color change, pallor, rash and wound. Allergic/Immunologic: Negative. Neurological: Negative. Negative for dizziness, seizures, syncope, weakness, numbness and headaches. Hematological: Negative. Psychiatric/Behavioral: Negative. Negative for dysphoric mood. The patient is not nervous/anxious. Objective:     Vitals:    01/10/23 1059   BP: 118/78   Pulse: 70   Temp: 97.1 °F (36.2 °C)   SpO2: 100%       Body mass index is 45.97 kg/m². Physical Exam  Constitutional:       General: She is not in acute distress. Appearance: Normal appearance. She is well-developed. She is obese. She is not ill-appearing, toxic-appearing or diaphoretic. HENT:      Head: Normocephalic and atraumatic. Right Ear: Tympanic membrane, ear canal and external ear normal.      Left Ear: Tympanic membrane, ear canal and external ear normal.      Nose: Nose normal.      Mouth/Throat:      Mouth: Mucous membranes are moist.      Pharynx: Oropharynx is clear. No oropharyngeal exudate or posterior oropharyngeal erythema. Eyes:      General: No scleral icterus. Right eye: No discharge. Left eye: No discharge. Conjunctiva/sclera: Conjunctivae normal.      Pupils: Pupils are equal, round, and reactive to light. Neck:      Trachea: No tracheal deviation. Cardiovascular:      Rate and Rhythm: Normal rate and regular rhythm. Heart sounds: Normal heart sounds. No murmur heard. No friction rub. No gallop. Pulmonary:      Effort: Pulmonary effort is normal. No tachypnea, accessory muscle usage or respiratory distress. Breath sounds: Normal breath sounds. No stridor. No decreased breath sounds, wheezing, rhonchi or rales. Abdominal:      Palpations: Abdomen is soft. Musculoskeletal:         General: No tenderness or deformity. Normal range of motion. Cervical back: Normal range of motion and neck supple. Lymphadenopathy:      Cervical: Cervical adenopathy present. Skin:     General: Skin is warm and dry. Coloration: Skin is not pale. Findings: No erythema or rash. Neurological:      Mental Status: She is alert and oriented to person, place, and time. GCS: GCS eye subscore is 4. GCS verbal subscore is 5. GCS motor subscore is 6. Gait: Gait is intact. Gait normal.   Psychiatric:         Speech: Speech normal.         Behavior: Behavior normal.         Thought Content: Thought content normal.         Judgment: Judgment normal.         Assessment:         1. Vitiligo    2. Viral URI        Plan:     1. Vitiligo    Benign nature discussed  Derm referral in the future if desired     2. Viral URI    Symptomatic treatment discussed  F/u PRN     Discussed use, benefit, and side effects of prescribed medications. All patient questions answered. Pt voiced understanding. Reviewed health maintenance. Instructed to continue current medications, diet and exercise. Patient agreedwith treatment plan. Follow up as directed.      Electronically signed by PRADIP Sparks CNP on 1/10/2023

## 2023-01-12 ENCOUNTER — HOSPITAL ENCOUNTER (OUTPATIENT)
Dept: PHYSICAL THERAPY | Facility: CLINIC | Age: 33
Setting detail: THERAPIES SERIES
Discharge: HOME | End: 2023-01-12

## 2023-01-12 NOTE — CONSULTS
[] [facility]) [facility]  [facility]  [address]    [phone]  F: [phone]   [x] [address]  [facility]   [address]  P: [phone]  F: [phone]  [] [address]  [address]  P: [phone]  F: [phone] [] [address]  P: [phone]  F: [phone]  [] [address]  [address] [address] [facility] [address]   [address]   [place]: [phone]  F: [phone]   [] [place] [address] [facility] [address]  [place]: [phone]   F: [phone]     Physical Therapy Cancel/No Show note    Date: 2023  Patient: Scarlet Niño  : 1990  MRN: [id number]    Cancels/No Shows to date:  FOR EVALS    For today's appointment patient:    [x]  Cancelled    [] Rescheduled appointment    [] No-show     Reason given by patient:    []  Patient ill    []  Conflicting appointment    [] No transportation      [x] Conflict with work    [] No reason given    [] Weather related    [] [id number]    [x] Other:      Comments:  Rescheduled 2023      [x] Next appointment was confirmed    Electronically signed by: Dean Burgos PT

## 2023-01-31 ENCOUNTER — TELEPHONE (OUTPATIENT)
Dept: FAMILY MEDICINE CLINIC | Age: 33
End: 2023-01-31

## 2023-01-31 NOTE — TELEPHONE ENCOUNTER
I thought she had an appointment with me today. I would recommend she reschedules for SD appointment with any available provider or myself on Thursday.

## 2023-01-31 NOTE — TELEPHONE ENCOUNTER
Patient states urine has foul odor, dysuria, low back pain, for 2 weeks. She is asking for antibiotics to be sent in. I explained that she may have to go to the lab for a urine sample or come in for an appointment.  Please advise

## 2023-01-31 NOTE — TELEPHONE ENCOUNTER
----- Message from Terrell Lombardo sent at 1/31/2023  9:49 AM EST -----  Subject: Medication Problem    Medication: Other - UTI Medication   Dosage: unknown   Ordering Provider: undefined    Question/Problem: Patient has UTI and would like meds called in if   possible       Pharmacy: [address] [place] [address] Mel [phone]    ---------------------------------------------------------------------------  --------------  Mandie VILLALPANDO  [phone]; OK to leave message on voicemail  ---------------------------------------------------------------------------  --------------    SCRIPT ANSWERS  Relationship to Patient: Self

## 2023-02-01 ENCOUNTER — HOSPITAL ENCOUNTER (OUTPATIENT)
Age: 33
Setting detail: SPECIMEN
Discharge: HOME | End: 2023-02-01

## 2023-02-01 ENCOUNTER — OFFICE VISIT (OUTPATIENT)
Dept: FAMILY MEDICINE CLINIC | Age: 33
End: 2023-02-01
Payer: COMMERCIAL

## 2023-02-01 VITALS — BODY MASS INDEX: 46.03 KG/M2 | HEART RATE: 88 BPM | TEMPERATURE: 97.3 F | WEIGHT: 285.2 LBS | OXYGEN SATURATION: 98 %

## 2023-02-01 DIAGNOSIS — N30.01 ACUTE CYSTITIS WITH HEMATURIA: Primary | ICD-10-CM

## 2023-02-01 DIAGNOSIS — N30.01 ACUTE CYSTITIS WITH HEMATURIA: ICD-10-CM

## 2023-02-01 DIAGNOSIS — R30.0 DYSURIA: ICD-10-CM

## 2023-02-01 DIAGNOSIS — N89.8 VAGINAL DISCHARGE: ICD-10-CM

## 2023-02-01 LAB
BILIRUBIN, POC: NORMAL
BLOOD URINE, POC: NORMAL
CANDIDA SPECIES, DNA PROBE: NEGATIVE
CLARITY, POC: CLEAR
COLOR, POC: YELLOW
CONTROL: NORMAL
GARDNERELLA VAGINALIS, DNA PROBE: NEGATIVE
GLUCOSE URINE, POC: NORMAL
KETONES, POC: NORMAL
LEUKOCYTE EST, POC: NORMAL
NITRITE, POC: NORMAL
PH, POC: 5.5
PREGNANCY TEST URINE, POC: NORMAL
PROTEIN, POC: 30
SOURCE: NORMAL
SPECIFIC GRAVITY, POC: 1.03
TRICHOMONAS VAGINALIS DNA: NEGATIVE
UROBILINOGEN, POC: 1

## 2023-02-01 PROCEDURE — G8417 CALC BMI ABV UP PARAM F/U: HCPCS

## 2023-02-01 PROCEDURE — 81025 URINE PREGNANCY TEST: CPT

## 2023-02-01 PROCEDURE — 1036F TOBACCO NON-USER: CPT

## 2023-02-01 PROCEDURE — 81003 URINALYSIS AUTO W/O SCOPE: CPT

## 2023-02-01 PROCEDURE — G8427 DOCREV CUR MEDS BY ELIG CLIN: HCPCS

## 2023-02-01 PROCEDURE — G8484 FLU IMMUNIZE NO ADMIN: HCPCS

## 2023-02-01 PROCEDURE — 99214 OFFICE O/P EST MOD 30 MIN: CPT

## 2023-02-01 RX ORDER — NITROFURANTOIN 25; 75 MG/1; MG/1
100 CAPSULE ORAL 2 TIMES DAILY
Qty: 14 CAPSULE | Refills: 0 | Status: SHIPPED | OUTPATIENT
Start: 2023-02-01 | End: 2023-02-08

## 2023-02-01 SDOH — ECONOMIC STABILITY: INCOME INSECURITY: HOW HARD IS IT FOR YOU TO PAY FOR THE VERY BASICS LIKE FOOD, HOUSING, MEDICAL CARE, AND HEATING?: NOT HARD AT ALL

## 2023-02-01 SDOH — ECONOMIC STABILITY: FOOD INSECURITY: WITHIN THE PAST 12 MONTHS, YOU WORRIED THAT YOUR FOOD WOULD RUN OUT BEFORE YOU GOT MONEY TO BUY MORE.: NEVER TRUE

## 2023-02-01 SDOH — ECONOMIC STABILITY: HOUSING INSECURITY
IN THE LAST 12 MONTHS, WAS THERE A TIME WHEN YOU DID NOT HAVE A STEADY PLACE TO SLEEP OR SLEPT IN A SHELTER (INCLUDING NOW)?: NO

## 2023-02-01 SDOH — ECONOMIC STABILITY: FOOD INSECURITY: WITHIN THE PAST 12 MONTHS, THE FOOD YOU BOUGHT JUST DIDN'T LAST AND YOU DIDN'T HAVE MONEY TO GET MORE.: NEVER TRUE

## 2023-02-01 ASSESSMENT — ENCOUNTER SYMPTOMS
SORE THROAT: 0
EYE DISCHARGE: 0
WHEEZING: 0
ABDOMINAL PAIN: 0
CONSTIPATION: 0
DIARRHEA: 0
ABDOMINAL DISTENTION: 0
COLOR CHANGE: 0
CHEST TIGHTNESS: 0
SHORTNESS OF BREATH: 0
VOMITING: 0
NAUSEA: 0
COUGH: 0

## 2023-02-01 NOTE — PROGRESS NOTES
Shama Boland (:  1990) is a 34 y.o. female,Established patient, here for evaluation of the following chief complaint(s):  Urinary Tract Infection (Burning with odor, been at least 2 weeks)          Subjective   SUBJECTIVE/OBJECTIVE:  Pt here today for c/o possible UTI  VSS    Pt reports she has had dysuria, foul urine odor for 3 weeks. She had an appointment scheduled w/ GYN but this was cancelled because they were under the assumption that this was a urinary complaint, not vaginal. Pt does state she has also had a foul smelling brown vaginal discharge for the last 2 weeks as well. She is sexually active w/ her , denies known exposure to any STI. Pt has had BV in the past and feels this is similar. Denies fevers, chills. No abdominal pain or vaginal bleeding. Pt has an IUD and does not have a regular menstrual cycle. Review of Systems   Constitutional:  Negative for activity change, appetite change, chills, fatigue, fever and unexpected weight change. HENT:  Negative for congestion, ear pain and sore throat. Eyes:  Negative for discharge and visual disturbance. Respiratory:  Negative for cough, chest tightness, shortness of breath and wheezing. Cardiovascular:  Negative for chest pain, palpitations and leg swelling. Gastrointestinal:  Negative for abdominal distention, abdominal pain, constipation, diarrhea, nausea and vomiting. Genitourinary:  Positive for dysuria, frequency, urgency and vaginal discharge. Negative for difficulty urinating, flank pain and pelvic pain. Musculoskeletal:  Negative for gait problem and neck pain. Skin:  Negative for color change, rash and wound. Neurological:  Negative for dizziness, seizures, weakness, numbness and headaches. Psychiatric/Behavioral:  Negative for behavioral problems, confusion and sleep disturbance. Objective   Physical Exam  Vitals and nursing note reviewed.    Constitutional:       General: She is not in acute distress. Appearance: Normal appearance. She is well-developed. She is obese. She is not ill-appearing, toxic-appearing or diaphoretic. HENT:      Head: Normocephalic and atraumatic. Right Ear: External ear normal.      Left Ear: External ear normal.      Nose: Nose normal.   Eyes:      General: No scleral icterus. Right eye: No discharge. Left eye: No discharge. Conjunctiva/sclera: Conjunctivae normal.      Pupils: Pupils are equal, round, and reactive to light. Neck:      Vascular: No carotid bruit. Trachea: No tracheal deviation. Cardiovascular:      Rate and Rhythm: Normal rate and regular rhythm. Heart sounds: Normal heart sounds. No murmur heard. No friction rub. No gallop. Pulmonary:      Effort: Pulmonary effort is normal. No tachypnea, accessory muscle usage or respiratory distress. Breath sounds: Normal breath sounds. No stridor. No decreased breath sounds, wheezing, rhonchi or rales. Abdominal:      Palpations: Abdomen is soft. Genitourinary:     Vagina: No signs of injury and foreign body. Vaginal discharge present. No erythema, tenderness, bleeding, lesions or prolapsed vaginal walls. Cervix: No cervical motion tenderness, discharge, friability, lesion, erythema, cervical bleeding or eversion. Uterus: Normal.       Adnexa: Right adnexa normal and left adnexa normal.      Comments: Thick white discharge noted  Musculoskeletal:         General: No tenderness or deformity. Normal range of motion. Cervical back: Normal range of motion and neck supple. Right lower leg: No edema. Left lower leg: No edema. Skin:     General: Skin is warm and dry. Coloration: Skin is not pale. Findings: No erythema or rash. Neurological:      Mental Status: She is alert and oriented to person, place, and time. GCS: GCS eye subscore is 4. GCS verbal subscore is 5. GCS motor subscore is 6. Gait: Gait is intact.  Gait normal.   Psychiatric:         Speech: Speech normal.         Behavior: Behavior normal.         Thought Content: Thought content normal.         Judgment: Judgment normal.               ASSESSMENT/PLAN:  1. Acute cystitis with hematuria  2. Dysuria  -d/t length of urinary sx will treat w/ abx  -UC sent    -     Culture, Urine; Future  -     POCT urine pregnancy  -     POCT Urinalysis No Micro (Auto)  -     nitrofurantoin, macrocrystal-monohydrate, (MACROBID) 100 MG capsule; Take 1 capsule by mouth 2 times daily for 7 days, Disp-14 capsule, R-0Normal    3. Vaginal discharge  -suspect recurrence of BV vs candida  -will await swabs for tx    -     Vaginitis DNA Probe; Future  -     C.trachomatis N.gonorrhoeae DNA; Future      Return if symptoms worsen or fail to improve. An electronic signature was used to authenticate this note.     --Rena Leyden, APROPAL - CNP

## 2023-02-02 LAB
C TRACH DNA SPEC QL PROBE+SIG AMP: NEGATIVE
MICROORGANISM SPEC CULT: NORMAL
N GONORRHOEA DNA SPEC QL PROBE+SIG AMP: NEGATIVE
SPECIMEN DESCRIPTION: NORMAL
SPECIMEN DESCRIPTION: NORMAL

## 2023-04-25 ENCOUNTER — OFFICE VISIT (OUTPATIENT)
Dept: FAMILY MEDICINE CLINIC | Age: 33
End: 2023-04-25
Payer: COMMERCIAL

## 2023-04-25 VITALS
DIASTOLIC BLOOD PRESSURE: 80 MMHG | WEIGHT: 288 LBS | SYSTOLIC BLOOD PRESSURE: 124 MMHG | OXYGEN SATURATION: 99 % | TEMPERATURE: 96.9 F | BODY MASS INDEX: 46.48 KG/M2 | HEART RATE: 100 BPM

## 2023-04-25 DIAGNOSIS — K21.9 GASTROESOPHAGEAL REFLUX DISEASE, UNSPECIFIED WHETHER ESOPHAGITIS PRESENT: Primary | ICD-10-CM

## 2023-04-25 DIAGNOSIS — R10.30 LOWER ABDOMINAL PAIN: ICD-10-CM

## 2023-04-25 DIAGNOSIS — R19.4 CHANGE IN BOWEL HABITS: ICD-10-CM

## 2023-04-25 PROCEDURE — G8427 DOCREV CUR MEDS BY ELIG CLIN: HCPCS | Performed by: NURSE PRACTITIONER

## 2023-04-25 PROCEDURE — G8417 CALC BMI ABV UP PARAM F/U: HCPCS | Performed by: NURSE PRACTITIONER

## 2023-04-25 PROCEDURE — 99214 OFFICE O/P EST MOD 30 MIN: CPT | Performed by: NURSE PRACTITIONER

## 2023-04-25 PROCEDURE — 1036F TOBACCO NON-USER: CPT | Performed by: NURSE PRACTITIONER

## 2023-04-25 RX ORDER — FAMOTIDINE 20 MG/1
20 TABLET, FILM COATED ORAL 2 TIMES DAILY PRN
Qty: 60 TABLET | Refills: 1 | Status: SHIPPED | OUTPATIENT
Start: 2023-04-25

## 2023-04-25 ASSESSMENT — ENCOUNTER SYMPTOMS
ANAL BLEEDING: 0
RESPIRATORY NEGATIVE: 1
COLOR CHANGE: 0
DIARRHEA: 1
FLATUS: 0
VOMITING: 0
WHEEZING: 0
HEMATOCHEZIA: 0
EYES NEGATIVE: 1
BELCHING: 0
COUGH: 0
CONSTIPATION: 1
BLOOD IN STOOL: 0
ABDOMINAL PAIN: 1
ALLERGIC/IMMUNOLOGIC NEGATIVE: 1
CHEST TIGHTNESS: 0
BACK PAIN: 1
NAUSEA: 0

## 2023-04-25 ASSESSMENT — PATIENT HEALTH QUESTIONNAIRE - PHQ9
SUM OF ALL RESPONSES TO PHQ QUESTIONS 1-9: 0
1. LITTLE INTEREST OR PLEASURE IN DOING THINGS: 0
SUM OF ALL RESPONSES TO PHQ QUESTIONS 1-9: 0
SUM OF ALL RESPONSES TO PHQ QUESTIONS 1-9: 0
SUM OF ALL RESPONSES TO PHQ9 QUESTIONS 1 & 2: 0
SUM OF ALL RESPONSES TO PHQ QUESTIONS 1-9: 0
2. FEELING DOWN, DEPRESSED OR HOPELESS: 0

## 2023-04-25 ASSESSMENT — CROHNS DISEASE ACTIVITY INDEX (CDAI): CDAI SCORE: 0

## 2023-04-25 NOTE — PROGRESS NOTES
[facility]  [address] [facility]  Dept: [phone]    Alexandra Irving is a 34 y.o. female who presents today for her medical conditions/complaintsas noted below. Alexandra Irving is here today c/o Gastroesophageal Reflux (Pt states  Recheck  for IBS)    Past Medical History:   Diagnosis Date    Abnormal Pap smear of cervix     Anemia complicating pregnancy in second trimester 2015    COVID-19 2021 night sweats, cough, sore throat x 1 week    Gastroesophageal reflux disease 2019    on rx    Morbidly obese ([address]     Snores     no sleep apnea    Wellness examination     [facility] APRN-[facility] [address]  last seen   . Seeing 22 for clearance.        Past Surgical History:   Procedure Laterality Date     SECTION  2015    GASTRIC BAND  2019    GASTRIC SLEEVE    JUAN-EN-Y GASTRIC BYPASS  2022    XI ROBOTIC LAPAROSCOPIC GASTRIC SLEEVE TO JUAN-EN-Y GASTRIC BYPASS, EGD    JUAN-EN-Y GASTRIC BYPASS N/A 3/7/2022    XI ROBOTIC LAPAROSCOPIC GASTRIC SLEEVE TO JUAN-EN-Y GASTRIC BYPASS, EGD, WITH UMBILICAL HERNIA REPAIR performed by Odin Blevins DO at [address] [place] [address] N/A 2019    EGD BIOPSY performed by Willow Vila MD at [facility] N/A 10/08/2021    EGD BIOPSY performed by Odin Blevins DO at [id number] [place] [address].       Family History   Problem Relation Age of Onset    Cancer Paternal Grandfather         unknown- Lung     Thyroid Disease Maternal Grandmother     Diabetes Maternal Grandfather     Cancer Maternal Grandfather         throat    Hypertension Maternal Grandfather     Depression Mother     Cancer Maternal Aunt         stomach    Cancer Maternal Uncle         prostate    Stroke Maternal Uncle     Diabetes Maternal Uncle        Social History     Tobacco Use    Smoking status: Never    Smokeless tobacco: Never

## 2023-05-17 ENCOUNTER — TELEPHONE (OUTPATIENT)
Dept: OBGYN CLINIC | Age: 33
End: 2023-05-17

## 2023-05-17 DIAGNOSIS — N76.0 ACUTE VAGINITIS: ICD-10-CM

## 2023-05-17 RX ORDER — METRONIDAZOLE 500 MG/1
500 TABLET ORAL 2 TIMES DAILY
Qty: 14 TABLET | Refills: 0 | Status: CANCELLED | OUTPATIENT
Start: 2023-05-17 | End: 2023-05-24

## 2023-05-17 NOTE — TELEPHONE ENCOUNTER
Pt called in w symptoms of BV , pt is requesting a script , she is up to date w Annual . Please call into [address]

## 2023-05-18 ENCOUNTER — TELEPHONE (OUTPATIENT)
Dept: OBGYN CLINIC | Age: 33
End: 2023-05-18

## 2023-05-18 RX ORDER — CLINDAMYCIN HYDROCHLORIDE 300 MG/1
300 CAPSULE ORAL 2 TIMES DAILY
Qty: 14 CAPSULE | Refills: 0 | Status: SHIPPED | OUTPATIENT
Start: 2023-05-18 | End: 2023-05-25

## 2023-05-23 DIAGNOSIS — N92.6 MISSED MENSES: Primary | ICD-10-CM

## 2023-06-23 ENCOUNTER — OFFICE VISIT (OUTPATIENT)
Dept: FAMILY MEDICINE CLINIC | Age: 33
End: 2023-06-23
Payer: COMMERCIAL

## 2023-06-23 ENCOUNTER — HOSPITAL ENCOUNTER (OUTPATIENT)
Age: 33
Setting detail: SPECIMEN
Discharge: HOME | End: 2023-06-23

## 2023-06-23 VITALS
WEIGHT: 293 LBS | SYSTOLIC BLOOD PRESSURE: 126 MMHG | HEART RATE: 68 BPM | TEMPERATURE: 97.9 F | DIASTOLIC BLOOD PRESSURE: 86 MMHG | OXYGEN SATURATION: 99 % | BODY MASS INDEX: 47.61 KG/M2

## 2023-06-23 DIAGNOSIS — F07.81 POST CONCUSSION SYNDROME: Primary | ICD-10-CM

## 2023-06-23 DIAGNOSIS — N92.6 MISSED MENSES: ICD-10-CM

## 2023-06-23 DIAGNOSIS — S09.90XD INJURY OF HEAD, SUBSEQUENT ENCOUNTER: ICD-10-CM

## 2023-06-23 DIAGNOSIS — R19.4 CHANGE IN BOWEL HABITS: ICD-10-CM

## 2023-06-23 DIAGNOSIS — R82.90 ABNORMAL URINALYSIS: Primary | ICD-10-CM

## 2023-06-23 DIAGNOSIS — R10.30 LOWER ABDOMINAL PAIN: ICD-10-CM

## 2023-06-23 LAB
ALBUMIN SERPL-MCNC: 3.6 G/DL (ref 3.5–5.2)
ALBUMIN/GLOB SERPL: 0.9 {RATIO} (ref 1–2.5)
ALP SERPL-CCNC: 112 U/L (ref 35–104)
ALT SERPL-CCNC: 10 U/L (ref 5–33)
ANION GAP SERPL CALCULATED.3IONS-SCNC: 15 MMOL/L (ref 9–17)
AST SERPL-CCNC: 15 U/L
BACTERIA URNS QL MICRO: ABNORMAL
BASOPHILS # BLD: 0.08 K/UL (ref 0–0.2)
BASOPHILS NFR BLD: 1 % (ref 0–2)
BILIRUB SERPL-MCNC: 0.4 MG/DL (ref 0.3–1.2)
BILIRUB UR QL STRIP: NEGATIVE
BUN SERPL-MCNC: 13 MG/DL (ref 6–20)
CALCIUM SERPL-MCNC: 8.8 MG/DL (ref 8.6–10.4)
CHLORIDE SERPL-SCNC: 105 MMOL/L (ref 98–107)
CLARITY UR: ABNORMAL
CO2 SERPL-SCNC: 19 MMOL/L (ref 20–31)
COLOR UR: YELLOW
CREAT SERPL-MCNC: 0.73 MG/DL (ref 0.5–0.9)
EOSINOPHIL # BLD: 0.18 K/UL (ref 0–0.44)
EOSINOPHILS RELATIVE PERCENT: 2 % (ref 1–4)
EPI CELLS #/AREA URNS HPF: ABNORMAL /HPF (ref 0–5)
ERYTHROCYTE [DISTWIDTH] IN BLOOD BY AUTOMATED COUNT: 14.3 % (ref 11.8–14.4)
GFR SERPL CREATININE-BSD FRML MDRD: >60 ML/MIN/1.73M2
GLUCOSE SERPL-MCNC: 77 MG/DL (ref 70–99)
GLUCOSE UR STRIP-MCNC: NEGATIVE MG/DL
HCG QUANTITATIVE: <1 MIU/ML
HCG SERPL QL: NEGATIVE
HCT VFR BLD AUTO: 39.2 % (ref 36.3–47.1)
HGB BLD-MCNC: 11.6 G/DL (ref 11.9–15.1)
HGB UR QL STRIP.AUTO: ABNORMAL
IMM GRANULOCYTES # BLD AUTO: <0.03 K/UL (ref 0–0.3)
IMM GRANULOCYTES NFR BLD: 0 %
KETONES UR STRIP-MCNC: NEGATIVE MG/DL
LEUKOCYTE ESTERASE UR QL STRIP: ABNORMAL
LIPASE SERPL-CCNC: 19 U/L (ref 13–60)
LYMPHOCYTES # BLD: 47 % (ref 24–43)
LYMPHOCYTES NFR BLD: 4.14 K/UL (ref 1.1–3.7)
MCH RBC QN AUTO: 27.4 PG (ref 25.2–33.5)
MCHC RBC AUTO-ENTMCNC: 29.6 G/DL (ref 28.4–34.8)
MCV RBC AUTO: 92.7 FL (ref 82.6–102.9)
MONOCYTES NFR BLD: 0.49 K/UL (ref 0.1–1.2)
MONOCYTES NFR BLD: 6 % (ref 3–12)
MUCOUS THREADS URNS QL MICRO: ABNORMAL
NEUTROPHILS NFR BLD: 44 % (ref 36–65)
NEUTS SEG NFR BLD: 3.84 K/UL (ref 1.5–8.1)
NITRITE UR QL STRIP: NEGATIVE
NRBC AUTOMATED: 0 PER 100 WBC
PH UR STRIP: 6 [PH] (ref 5–8)
PLATELET # BLD AUTO: 376 K/UL (ref 138–453)
PMV BLD AUTO: 10 FL (ref 8.1–13.5)
POTASSIUM SERPL-SCNC: 4.1 MMOL/L (ref 3.7–5.3)
PROT SERPL-MCNC: 7.7 G/DL (ref 6.4–8.3)
PROT UR STRIP-MCNC: NEGATIVE MG/DL
RBC # BLD AUTO: 4.23 M/UL (ref 3.95–5.11)
RBC #/AREA URNS HPF: ABNORMAL /HPF (ref 0–2)
SODIUM SERPL-SCNC: 139 MMOL/L (ref 135–144)
SP GR UR STRIP: 1.03 (ref 1–1.03)
UROBILINOGEN UR STRIP-ACNC: NORMAL
WBC #/AREA URNS HPF: ABNORMAL /HPF (ref 0–5)
WBC OTHER # BLD: 8.8 K/UL (ref 3.5–11.3)

## 2023-06-23 PROCEDURE — G8427 DOCREV CUR MEDS BY ELIG CLIN: HCPCS

## 2023-06-23 PROCEDURE — 99213 OFFICE O/P EST LOW 20 MIN: CPT

## 2023-06-23 PROCEDURE — 1036F TOBACCO NON-USER: CPT

## 2023-06-23 PROCEDURE — G8417 CALC BMI ABV UP PARAM F/U: HCPCS

## 2023-06-23 SDOH — ECONOMIC STABILITY: FOOD INSECURITY: WITHIN THE PAST 12 MONTHS, YOU WORRIED THAT YOUR FOOD WOULD RUN OUT BEFORE YOU GOT MONEY TO BUY MORE.: NEVER TRUE

## 2023-06-23 SDOH — ECONOMIC STABILITY: FOOD INSECURITY: WITHIN THE PAST 12 MONTHS, THE FOOD YOU BOUGHT JUST DIDN'T LAST AND YOU DIDN'T HAVE MONEY TO GET MORE.: NEVER TRUE

## 2023-06-23 SDOH — ECONOMIC STABILITY: INCOME INSECURITY: HOW HARD IS IT FOR YOU TO PAY FOR THE VERY BASICS LIKE FOOD, HOUSING, MEDICAL CARE, AND HEATING?: NOT HARD AT ALL

## 2023-06-23 ASSESSMENT — ENCOUNTER SYMPTOMS
VOMITING: 0
SORE THROAT: 0
EYE DISCHARGE: 0
COLOR CHANGE: 0
CHEST TIGHTNESS: 0
DIARRHEA: 0
SHORTNESS OF BREATH: 0
WHEEZING: 0
NAUSEA: 0
ABDOMINAL PAIN: 0
CONSTIPATION: 0
COUGH: 0

## 2023-06-23 ASSESSMENT — PATIENT HEALTH QUESTIONNAIRE - PHQ9
SUM OF ALL RESPONSES TO PHQ9 QUESTIONS 1 & 2: 0
2. FEELING DOWN, DEPRESSED OR HOPELESS: 0
SUM OF ALL RESPONSES TO PHQ QUESTIONS 1-9: 0
1. LITTLE INTEREST OR PLEASURE IN DOING THINGS: 0
SUM OF ALL RESPONSES TO PHQ QUESTIONS 1-9: 0

## 2023-06-23 ASSESSMENT — VISUAL ACUITY: OU: 1
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
## 2023-06-24 LAB
MICROORGANISM SPEC CULT: NORMAL
SPECIMEN DESCRIPTION: NORMAL

## 2023-06-26 DIAGNOSIS — R82.90 ABNORMAL URINALYSIS: Primary | ICD-10-CM

## 2023-07-12 ENCOUNTER — TELEPHONE (OUTPATIENT)
Dept: OBGYN CLINIC | Age: 33
End: 2023-07-12

## 2023-07-12 DIAGNOSIS — N76.0 ACUTE VAGINITIS: ICD-10-CM

## 2023-07-12 RX ORDER — CLINDAMYCIN HYDROCHLORIDE 300 MG/1
300 CAPSULE ORAL 2 TIMES DAILY
Qty: 14 CAPSULE | Refills: 0 | Status: SHIPPED | OUTPATIENT
Start: 2023-07-12 | End: 2023-07-19

## 2023-08-22 ENCOUNTER — HOSPITAL ENCOUNTER (EMERGENCY)
Age: 33
Discharge: HOME | End: 2023-08-22
Attending: EMERGENCY MEDICINE

## 2023-08-22 VITALS
BODY MASS INDEX: 46.61 KG/M2 | WEIGHT: 290 LBS | TEMPERATURE: 98.7 F | HEART RATE: 68 BPM | DIASTOLIC BLOOD PRESSURE: 77 MMHG | HEIGHT: 66 IN | SYSTOLIC BLOOD PRESSURE: 123 MMHG | RESPIRATION RATE: 17 BRPM | OXYGEN SATURATION: 100 %

## 2023-08-22 DIAGNOSIS — R11.0 NAUSEA: Primary | ICD-10-CM

## 2023-08-22 DIAGNOSIS — E16.2 HYPOGLYCEMIA: ICD-10-CM

## 2023-08-22 LAB
GLUCOSE BLD-MCNC: 57 MG/DL
GLUCOSE BLD-MCNC: 57 MG/DL (ref 65–105)
HCG UR QL: NEGATIVE

## 2023-08-22 PROCEDURE — 6370000000 HC RX 637 (ALT 250 FOR IP): Performed by: EMERGENCY MEDICINE

## 2023-08-22 PROCEDURE — 82947 ASSAY GLUCOSE BLOOD QUANT: CPT

## 2023-08-22 PROCEDURE — 99283 EMERGENCY DEPT VISIT LOW MDM: CPT

## 2023-08-22 PROCEDURE — 81025 URINE PREGNANCY TEST: CPT

## 2023-08-22 RX ORDER — ONDANSETRON 4 MG/1
4 TABLET, ORALLY DISINTEGRATING ORAL 3 TIMES DAILY PRN
Qty: 21 TABLET | Refills: 0 | Status: SHIPPED | OUTPATIENT
Start: 2023-08-22

## 2023-08-22 RX ORDER — VITAMIN B COMPLEX
1 CAPSULE ORAL DAILY
Qty: 30 CAPSULE | Refills: 0 | Status: SHIPPED | OUTPATIENT
Start: 2023-08-22

## 2023-08-22 RX ORDER — GAUZE BANDAGE 2" X 2"
100 BANDAGE TOPICAL ONCE
Status: COMPLETED | OUTPATIENT
Start: 2023-08-22 | End: 2023-08-22

## 2023-08-22 RX ADMIN — THIAMINE HCL TAB 100 MG 100 MG: 100 TAB at 10:12

## 2023-08-22 ASSESSMENT — LIFESTYLE VARIABLES
HOW MANY STANDARD DRINKS CONTAINING ALCOHOL DO YOU HAVE ON A TYPICAL DAY: PATIENT DOES NOT DRINK
HOW OFTEN DO YOU HAVE A DRINK CONTAINING ALCOHOL: NEVER

## 2023-08-22 NOTE — ED PROVIDER NOTES
EMERGENCY DEPARTMENT ENCOUNTER    Pt Name: Chintan Gordon  MRN: [id number]  [address] [facility] 1990  Date of evaluation: 8/22/23  CHIEF COMPLAINT       Chief Complaint   Patient presents with    Dizziness    Nausea     HISTORY OF PRESENT ILLNESS   HPI  Some nausea, was not feeling well at work today. She is here with her son who is also being seen for a separate complaint. Both of them are being seen in the same room. She has a history of gastric bypass, not taking any current multivitamins. Did not eat any breakfast this morning or take medications yet. Here with significant other and child. Denies any chest pain abdominal pain vomiting diarrhea, no dysuria frequency urgency, no fever chills sweats. Did not eat any food this morning. REVIEW OF SYSTEMS     Review of Systems   All other systems reviewed and are negative. PASTMEDICAL HISTORY     Past Medical History:   Diagnosis Date    Abnormal Pap smear of cervix 55/87/9630    Anemia complicating pregnancy in second trimester 03/11/2015    COVID-19 12/2021 12/30/2021 night sweats, cough, sore throat x 1 week    Gastroesophageal reflux disease 09/05/2019    on rx    Morbidly obese ([address])     Snores     no sleep apnea    Wellness examination     [facility] APRN-CNP [address] [facility]  last seen 2021  . Seeing 2/24/22 for clearance.       Past Problem List  Patient Active Problem List   Diagnosis Code    H/O abnormal cervical Papanicolaou smear Z87.42    Anal warts A63.0    Rh+/RI/GBS- O09.90    H/O LGSIL (1/29/15) R87.622    Hiatal hernia with GERD and esophagitis K44.9, K21.00    Morbid obesity (HCC) E66.01    S/P gastric bypass Z98.84    History of sleeve gastrectomy Z90.3    Low vitamin B12 level E53.8    Low folate E53.8    Vitamin D deficiency K96.6    Umbilical hernia without obstruction and without gangrene K42.9    Chronic tension-type headache, not intractable G44.229    Chronic midline low back pain without sciatica M54.50, G89.29     SURGICAL

## 2023-08-22 NOTE — ED NOTES
Pt given cookies, pretzels and apple juice to support glucose of [address], RN  08/22/23 1322

## 2023-11-30 ENCOUNTER — OFFICE VISIT (OUTPATIENT)
Dept: FAMILY MEDICINE CLINIC | Age: 33
End: 2023-11-30

## 2023-11-30 VITALS
TEMPERATURE: 97.3 F | HEART RATE: 99 BPM | BODY MASS INDEX: 47.94 KG/M2 | SYSTOLIC BLOOD PRESSURE: 116 MMHG | WEIGHT: 293 LBS | DIASTOLIC BLOOD PRESSURE: 72 MMHG | OXYGEN SATURATION: 100 %

## 2023-11-30 DIAGNOSIS — E66.01 CLASS 3 SEVERE OBESITY DUE TO EXCESS CALORIES WITHOUT SERIOUS COMORBIDITY WITH BODY MASS INDEX (BMI) OF 45.0 TO 49.9 IN ADULT (HCC): ICD-10-CM

## 2023-11-30 DIAGNOSIS — Z98.84 S/P GASTRIC BYPASS: ICD-10-CM

## 2023-11-30 DIAGNOSIS — R19.5 HEME POSITIVE STOOL: ICD-10-CM

## 2023-11-30 DIAGNOSIS — R20.0 ARM NUMBNESS LEFT: ICD-10-CM

## 2023-11-30 DIAGNOSIS — B35.9 TINEA: ICD-10-CM

## 2023-11-30 DIAGNOSIS — K62.5 RECTAL BLEEDING: Primary | ICD-10-CM

## 2023-11-30 PROCEDURE — 99214 OFFICE O/P EST MOD 30 MIN: CPT | Performed by: NURSE PRACTITIONER

## 2023-11-30 RX ORDER — KETOCONAZOLE 20 MG/G
1 CREAM TOPICAL 2 TIMES DAILY
Qty: 60 G | Refills: 0 | Status: SHIPPED | OUTPATIENT
Start: 2023-11-30

## 2023-11-30 ASSESSMENT — ENCOUNTER SYMPTOMS
ANAL BLEEDING: 1
DIARRHEA: 0
SHORTNESS OF BREATH: 0
BLOOD IN STOOL: 1
CHEST TIGHTNESS: 0
ABDOMINAL PAIN: 0
RESPIRATORY NEGATIVE: 1
COLOR CHANGE: 0
WHEEZING: 0
VOMITING: 0
CONSTIPATION: 0
COUGH: 0
ALLERGIC/IMMUNOLOGIC NEGATIVE: 1
EYES NEGATIVE: 1
NAUSEA: 0

## 2023-11-30 ASSESSMENT — PATIENT HEALTH QUESTIONNAIRE - PHQ9
2. FEELING DOWN, DEPRESSED OR HOPELESS: 0
SUM OF ALL RESPONSES TO PHQ9 QUESTIONS 1 & 2: 0
SUM OF ALL RESPONSES TO PHQ QUESTIONS 1-9: 0
1. LITTLE INTEREST OR PLEASURE IN DOING THINGS: 0

## 2023-11-30 NOTE — PROGRESS NOTES
[facility]  [address] [facility] [facility]  Dept: [phone]    Daron Pizano is a 34 y.o. female who presents today for her medical conditions/complaintsas noted below. Daron Pizano is here today c/o Numbness (Left arm X 3 days comes and goes) and Rectal Bleeding (2 episodes this past week)    Past Medical History:   Diagnosis Date    Abnormal Pap smear of cervix     Anemia complicating pregnancy in second trimester 2015    COVID-19 2021 night sweats, cough, sore throat x 1 week    Gastroesophageal reflux disease 2019    on rx    Morbidly obese ([address])     Snores     no sleep apnea    Wellness examination     Monic[facility] Nicole APRN-CNP [address] [facility]  last seen   . Seeing 22 for clearance.        Past Surgical History:   Procedure Laterality Date     SECTION  2015    GASTRIC BAND  2019    GASTRIC SLEEVE    JUAN-EN-Y GASTRIC BYPASS  2022    XI ROBOTIC LAPAROSCOPIC GASTRIC SLEEVE TO JUAN-EN-Y GASTRIC BYPASS, EGD    JUAN-EN-Y GASTRIC BYPASS N/A 3/7/2022    XI ROBOTIC LAPAROSCOPIC GASTRIC SLEEVE TO JUAN-EN-Y GASTRIC BYPASS, EGD, WITH UMBILICAL HERNIA REPAIR performed by Angel Luis Jaramillo DO at [facility] N/A 2019    EGD BIOPSY performed by Earnest Reynoso MD at [address] [facility] N/A 10/08/2021    EGD BIOPSY performed by Angel Luis Jaramillo DO at [address] [facility]       Family History   Problem Relation Age of Onset    Cancer Paternal Grandfather         unknown- Lung     Thyroid Disease Maternal Grandmother     Diabetes Maternal Grandfather     Cancer Maternal Grandfather         throat    Hypertension Maternal Grandfather     Depression Mother     Cancer Maternal Aunt         stomach    Cancer Maternal Uncle         prostate    Stroke Maternal Uncle     Diabetes Maternal Uncle        Social History     Tobacco Use    Smoking

## 2023-11-30 NOTE — PATIENT INSTRUCTIONS
[facility] - Tra Sharma, [address], [address] [address], [facility] [address]  [phone]

## 2023-12-19 PROBLEM — D52.8 OTHER FOLATE DEFICIENCY ANEMIAS: Status: ACTIVE | Noted: 2023-12-19

## 2024-01-23 ENCOUNTER — TELEPHONE (OUTPATIENT)
Dept: FAMILY MEDICINE CLINIC | Age: 34
End: 2024-01-23

## 2024-01-23 DIAGNOSIS — M54.50 LOW BACK PAIN, UNSPECIFIED BACK PAIN LATERALITY, UNSPECIFIED CHRONICITY, UNSPECIFIED WHETHER SCIATICA PRESENT: ICD-10-CM

## 2024-01-23 DIAGNOSIS — R20.0 LEFT ARM NUMBNESS: Primary | ICD-10-CM

## 2024-01-23 NOTE — TELEPHONE ENCOUNTER
----- Message from Eliecer Quick sent at 1/22/2024  4:26 PM EST -----  Subject: Referral Request    Reason for referral request? Physical therapy for numbness of left wrist   and lower back. sent to [facility].   Provider patient wants to be referred to(if known):     Provider Phone Number(if known):    Additional Information for Provider?   ---------------------------------------------------------------------------  --------------  CALL BACK INFO    [phone]; OK to leave message on voicemail  ---------------------------------------------------------------------------  --------------

## 2024-01-30 ENCOUNTER — HOSPITAL ENCOUNTER (OUTPATIENT)
Dept: PHYSICAL THERAPY | Facility: CLINIC | Age: 34
Setting detail: THERAPIES SERIES
Discharge: HOME | End: 2024-01-30

## 2024-01-30 NOTE — CONSULTS
[] [facility]  Outpatient Rehabilitation &  Therapy  [address]  [phone]  [phone] [x] [facility]  Outpatient Rehabilitation &  Therapy  [address] [facility] [address]  P: [phone]  F: [phone] [] [facility]  Outpatient Rehabilitation &  Therapy  [address] [facility] [address]  P: [phone]  F: [phone]  [] 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

## 2024-02-14 ENCOUNTER — HOSPITAL ENCOUNTER (OUTPATIENT)
Dept: PHYSICAL THERAPY | Facility: CLINIC | Age: 34
Setting detail: THERAPIES SERIES
Discharge: HOME | End: 2024-02-14

## 2024-02-14 NOTE — CONSULTS
[] [facility]  Outpatient Rehabilitation &  Therapy  [address]  [phone]  [phone] [x] [facility]  Outpatient Rehabilitation &  Therapy  [address]  P: [phone]  F: [phone] [] [facility]  Outpatient Rehabilitation &  Therapy  [address]  P: [phone]  F: [phone]  [] [facility] Outpatient Rehabilitation &  Therapy  [address]  P: [phone]  F: [phone]     Therapy Cancel/No Show note    Date: 2024  Patient: Amada Avalos  : 1990  MRN: [id number]    Cancels/No Shows to date:     For today's appointment patient:    [x]  Cancelled    [x] Rescheduled appointment    [] No-show     Reason given by patient:    []  Patient ill    []  Conflicting appointment    [] No transportation      [] Conflict with work    [] No reason given    [] Weather related    [] COVID-19    [x] Other:      Comments:  Pt called at 10:05 for 10 am eval reporting that she was running behind and would be here in about 10 minutes. Rescheduled to next week to have full amount of time for evaluation.      [x] Next appointment was confirmed    Electronically signed by: Kacey Rivas, PT

## 2024-02-21 ENCOUNTER — HOSPITAL ENCOUNTER (OUTPATIENT)
Dept: PHYSICAL THERAPY | Facility: CLINIC | Age: 34
Setting detail: THERAPIES SERIES
Discharge: HOME | End: 2024-02-21
Payer: COMMERCIAL

## 2024-02-21 PROCEDURE — 97110 THERAPEUTIC EXERCISES: CPT

## 2024-02-21 PROCEDURE — 97161 PT EVAL LOW COMPLEX 20 MIN: CPT

## 2024-02-21 NOTE — CONSULTS
improved hamstring mobility as evident by ability to achieve >50 degrees in SLR position bilaterally for improved positioning for prolonged standing.  Pt will demonstrate improved neck mobility for decreased instances of L sided numbness symptoms.  R SB > 50 degrees  R rotation > 60 degrees  ? Strength: Pt will demonstrate improved hip abduction strength to 4+/5 globally for improved dynamic strength for continued working out.  ? Function: Pt will demonstrate improved weight bearing tolerance and positioning as evident by the ability to perform SL standing for 30 seconds bilaterally without trunk compensation.  Patient to be independent with home exercise program as demonstrated by performance with correct form without cues.  LTG: (to be met in 12 treatments)  Pt will report no L wrist numbness and tingling over a week period.  Pt will demonstrate improved hip flexion mobility in SKTC positioning to >90 degrees in order to improve mobility and positioning for prolonged standing and walking.  Pt will demonstrate improved core strength and stability as evident by the ability to perform 10x core stabilization + seated march without trunk compensation for improved strength for dynamic household tasks.  Pt will demonstrate improved functional activity tolerance as evident by an improved score on the Oswestry to <6% functional impairment.                   Patient goals: \"more exercises and more stretching\"    Rehab Potential:  [x] Good  [] Fair  [] Poor   Suggested Professional Referral:  [x] No  [] Yes:  Barriers to Goal Achievement:  [x] No  [] Yes:  Domestic Concerns:  [x] No  [] Yes:    Pt. Education:  [x] Plans/Goals, Risks/Benefits discussed  [x] Home exercise program    Method of Education: [x] Verbal  [x] Demo  [x] Written  Access Code: [email or web address]  URL: [email or web address]  Date: 02/21/2024  Prepared by: Kacey Rivas    Exercises  - Suhailhell  - 1 x daily - 7 x weekly - 2 sets - 10 reps  - Seated

## 2024-02-26 ENCOUNTER — HOSPITAL ENCOUNTER (OUTPATIENT)
Dept: PHYSICAL THERAPY | Facility: CLINIC | Age: 34
Setting detail: THERAPIES SERIES
Discharge: HOME | End: 2024-02-26
Payer: COMMERCIAL

## 2024-02-26 NOTE — FLOWSHEET NOTE
[] [facility]  Outpatient Rehabilitation &  Therapy  [address]  [phone]  [phone] [] [facility]  Outpatient Rehabilitation &  Therapy  [address]  P: [phone]  F: [phone] [] [facility]  Outpatient Rehabilitation &  Therapy  [address]  P: [phone]  F: [phone] [] [facility]  Outpatient Rehabilitation &  Therapy  [address]  [phone]  [phone] [] [facility]  Outpatient Rehabilitation &  Therapy  [address]   P: [phone]  F: [phone]  [] [facility]  Outpatient Rehabilitation &  Therapy  [address]  P: [phone]  F: [phone] [] [facility]  Outpatient Rehabilitation &  Therapy  [address]  P: [phone]  F: [phone] [] [facility]  Outpatient Rehabilitation &  Therapy  [address]  P: [phone]  F: [phone] [] [facility]  Outpatient Rehabilitation &  Therapy  [address]  P: [phone]  F: [phone]  [] [facility] Outpatient Rehabilitation &  Therapy  [address]  P: [phone]  F: [phone]     Therapy Cancel/No Show note    Date: 2024  Patient: Amada TIMMONS Robbie  : 1990  MRN: [id number]    Cancels/No Shows to date: 3/0    For today's appointment patient:    [x]  Cancelled    [] Rescheduled appointment    [] No-show     Reason given by patient:    []  Patient ill    []  Conflicting appointment    [] No transportation      [x] Conflict with work    [] No reason given    [] Weather related    [] COVID-19    [] Other:      Comments:        [x] Next appointment was confirmed    Electronically signed by: Jonathan Patel PT

## 2024-02-27 ENCOUNTER — PATIENT MESSAGE (OUTPATIENT)
Dept: GASTROENTEROLOGY | Age: 34
End: 2024-02-27

## 2024-03-01 ENCOUNTER — HOSPITAL ENCOUNTER (OUTPATIENT)
Dept: PHYSICAL THERAPY | Facility: CLINIC | Age: 34
Setting detail: THERAPIES SERIES
Discharge: HOME | End: 2024-03-01

## 2024-03-01 NOTE — FLOWSHEET NOTE
[] [facility]  Outpatient Rehabilitation &  Therapy  [address]  [phone]  [phone] [] [facility]  Outpatient Rehabilitation &  Therapy  [address]  P: [phone]  F: [phone] [] [facility]  Outpatient Rehabilitation &  Therapy  [address]  P: [phone]  F: [phone] [] [facility]  Outpatient Rehabilitation &  Therapy  [address]  [phone]  [phone] [] [facility]  Outpatient Rehabilitation &  Therapy  [address]   P: [phone]  F: [phone]  [] [facility]  Outpatient Rehabilitation &  Therapy  [address]  P: [phone]  F: [phone] [] [facility]  Outpatient Rehabilitation &  Therapy  [address]  P: [phone]  F: [phone] [] [facility]  Outpatient Rehabilitation &  Therapy  [address]  P: [phone]  F: [phone] [] [facility]  Outpatient Rehabilitation &  Therapy  [address]  P: [phone]  F: [phone]  [] [facility] Outpatient Rehabilitation &  Therapy  [address]  P: [phone]  F: [phone]     Therapy Cancel/No Show note    Date: 3/1/2024  Patient: Amada TIMMONS Robbie  : 1990  MRN: [id number]    Cancels/No Shows to date:     For today's appointment patient:    [x]  Cancelled    [] Rescheduled appointment    [] No-show     Reason given by patient:    []  Patient ill    []  Conflicting appointment    [] No transportation      [x] Conflict with work    [] No reason given    [] Weather related    [] COVID-19    [] Other:      Comments:        [x] Next appointment was confirmed    Electronically signed by: Jonathan Patel PT

## 2024-03-18 ENCOUNTER — HOSPITAL ENCOUNTER (OUTPATIENT)
Dept: PHYSICAL THERAPY | Facility: CLINIC | Age: 34
Setting detail: THERAPIES SERIES
Discharge: HOME | End: 2024-03-18

## 2024-03-18 NOTE — FLOWSHEET NOTE
[] [facility]  Outpatient Rehabilitation &  Therapy  [address]  [phone]  [phone] [x] [facility]  Outpatient Rehabilitation &  Therapy  [address]  P: [phone]  F: [phone] [] [facility]  Outpatient Rehabilitation &  Therapy  [address]  P: [phone]  F: [phone] [] [facility]  Outpatient Rehabilitation &  Therapy  [address]  [phone]  [phone] [] [facility]  Outpatient Rehabilitation &  Therapy  [address]   P: [phone]  F: [phone]  [] [facility]  Outpatient Rehabilitation &  Therapy  [address]  P: [phone]  F: [phone] [] [facility]  Outpatient Rehabilitation &  Therapy  [address]  P: [phone]  F: [phone] [] [facility]  Outpatient Rehabilitation &  Therapy  [address]  P: [phone]  F: [phone] [] [facility]  Outpatient Rehabilitation &  Therapy  [address]  P: [phone]  F: [phone]  [] [facility] Outpatient Rehabilitation &  Therapy  [address]  P: [phone]  F: [phone]     Therapy Cancel/No Show note    Date: 3/18/2024  Patient: Amada Avalos  : 1990  MRN: [id number]    Cancels/No Shows to date:     For today's appointment patient:    [x]  Cancelled    [] Rescheduled appointment    [] No-show     Reason given by patient:    []  Patient ill    []  Conflicting appointment    [] No transportation      [] Conflict with work    [] No reason given    [] Weather related    [] COVID-19    [] Other:      Comments:  last minute cancellation; pt will not be rescheduled due to cancellation policy. Pt made aware of this.      [] Next appointment was confirmed    Electronically signed by: FLORA ARCOS,

## 2024-03-21 ENCOUNTER — HOSPITAL ENCOUNTER (OUTPATIENT)
Dept: PREADMISSION TESTING | Age: 34
Discharge: HOME | End: 2024-03-25

## 2024-03-21 VITALS — HEIGHT: 66 IN | BODY MASS INDEX: 47.09 KG/M2 | WEIGHT: 293 LBS

## 2024-03-21 NOTE — PROGRESS NOTES
Pre-op Instructions For Out-Patient Endoscopy Surgery    Medication Instructions:  Please stop herbs and any supplements now (includes vitamins and minerals).    Please contact your surgeon and prescribing physician for pre-op instructions for any blood thinners.    If you have inhalers/aerosol treatments at home, please use them the morning of your surgery and bring the inhalers with you to the hospital.    Please take the following medications the morning of your surgery with a sip of water:    None     Surgery Instructions:  After midnight before surgery:  Do not eat or drink anything, including water, mints, gum, and hard candy.  You may brush your teeth without swallowing.  No smoking, chewing tobacco, or street drugs.    Please shower or bathe before surgery.       Please do not wear any cologne, lotion, powder, jewelry, piercings, perfume, makeup, nail polish, hair accessories, or hair spray on the day of surgery.  Wear loose comfortable clothing.    Leave your valuables at home but bring a payment source for any after-surgery prescriptions you plan to fill at [facility].  Bring a storage case for any glasses/contacts.    An adult who is responsible for you MUST drive you home and should be with you for the first 24 hours after surgery.     The Day of Surgery:  Arrive at [facility] Surgery Entrance at the time directed by your surgeon and check in at the desk.     If you have a living will or healthcare power of , please bring a copy.    You will be taken to the pre-op holding area where you will be prepared for surgery.  A physical assessment will be performed by a nurse practitioner or house officer.  Your IV will be started and you will meet your anesthesiologist.    When you go to surgery, your family will be directed to the surgical waiting room, where the doctor should speak with them after your surgery.    After surgery, you will be taken to the recovery area.  When

## 2024-04-01 ENCOUNTER — TELEPHONE (OUTPATIENT)
Dept: GASTROENTEROLOGY | Age: 34
End: 2024-04-01

## 2024-04-01 NOTE — TELEPHONE ENCOUNTER
Patient called in regarding questions on prep for upcoming procedure on 4/4.    Please return her call to discuss       Thank you

## 2024-04-02 ENCOUNTER — PATIENT MESSAGE (OUTPATIENT)
Dept: GASTROENTEROLOGY | Age: 34
End: 2024-04-02

## 2024-04-02 NOTE — TELEPHONE ENCOUNTER
Writer called patient and went over prep instruction sent my chart message with prep instructions attachment patient understood instructions

## 2024-04-02 NOTE — PRE-PROCEDURE INSTRUCTIONS
No answer, left message ?                             Unable to leave message ?    When were you told to arrive at hospital ?  0730    Do you have a  ?yes    Are you on any blood thinners ?   no                  If yes when did you stop taking ?    Do you have your prep Rx filled and instruction ? yes     Nothing to eat the day before , only clear liquids.to follow directions per Dr. Brush office    Are you experiencing any covid symptoms ? no    Do you have any infections or rash we should be aware of ?no      Do you have the Hibiclens soap to use the night before and the morning of surgery ?n/a    Nothing to eat or drink after midnight, only a sip of water to take any medication instructed to take the night before.to follow directions per Dr. Brush office  Wear comfortable clothing, leave any valuables at home, remove any jewelry and body piercing . yes

## 2024-04-03 ENCOUNTER — ANESTHESIA EVENT (OUTPATIENT)
Dept: ENDOSCOPY | Age: 34
End: 2024-04-03
Payer: COMMERCIAL

## 2024-04-04 ENCOUNTER — HOSPITAL ENCOUNTER (OUTPATIENT)
Age: 34
Setting detail: OUTPATIENT SURGERY
Discharge: HOME | End: 2024-04-04
Attending: INTERNAL MEDICINE | Admitting: INTERNAL MEDICINE
Payer: COMMERCIAL

## 2024-04-04 ENCOUNTER — ANESTHESIA (OUTPATIENT)
Dept: ENDOSCOPY | Age: 34
End: 2024-04-04
Payer: COMMERCIAL

## 2024-04-04 VITALS
RESPIRATION RATE: 20 BRPM | WEIGHT: 293 LBS | HEART RATE: 62 BPM | DIASTOLIC BLOOD PRESSURE: 89 MMHG | OXYGEN SATURATION: 98 % | BODY MASS INDEX: 47.09 KG/M2 | HEIGHT: 66 IN | SYSTOLIC BLOOD PRESSURE: 137 MMHG | TEMPERATURE: 97.5 F

## 2024-04-04 DIAGNOSIS — K62.5 RECTAL BLEEDING: ICD-10-CM

## 2024-04-04 PROCEDURE — 2709999900 HC NON-CHARGEABLE SUPPLY: Performed by: INTERNAL MEDICINE

## 2024-04-04 PROCEDURE — 3700000001 HC ADD 15 MINUTES (ANESTHESIA): Performed by: INTERNAL MEDICINE

## 2024-04-04 PROCEDURE — 6360000002 HC RX W HCPCS: Performed by: NURSE ANESTHETIST, CERTIFIED REGISTERED

## 2024-04-04 PROCEDURE — 3609010600 HC COLONOSCOPY POLYPECTOMY SNARE/COLD BIOPSY: Performed by: INTERNAL MEDICINE

## 2024-04-04 PROCEDURE — 3700000000 HC ANESTHESIA ATTENDED CARE: Performed by: INTERNAL MEDICINE

## 2024-04-04 PROCEDURE — 88305 TISSUE EXAM BY PATHOLOGIST: CPT

## 2024-04-04 PROCEDURE — 81025 URINE PREGNANCY TEST: CPT

## 2024-04-04 PROCEDURE — 7100000011 HC PHASE II RECOVERY - ADDTL 15 MIN: Performed by: INTERNAL MEDICINE

## 2024-04-04 PROCEDURE — 45385 COLONOSCOPY W/LESION REMOVAL: CPT | Performed by: INTERNAL MEDICINE

## 2024-04-04 PROCEDURE — 2580000003 HC RX 258: Performed by: ANESTHESIOLOGY

## 2024-04-04 PROCEDURE — 7100000010 HC PHASE II RECOVERY - FIRST 15 MIN: Performed by: INTERNAL MEDICINE

## 2024-04-04 PROCEDURE — 2500000003 HC RX 250 WO HCPCS: Performed by: NURSE ANESTHETIST, CERTIFIED REGISTERED

## 2024-04-04 RX ORDER — LIDOCAINE HYDROCHLORIDE 20 MG/ML
INJECTION, SOLUTION INFILTRATION; PERINEURAL PRN
Status: DISCONTINUED | OUTPATIENT
Start: 2024-04-04 | End: 2024-04-04 | Stop reason: SDUPTHER

## 2024-04-04 RX ORDER — SODIUM CHLORIDE 0.9 % (FLUSH) 0.9 %
5-40 SYRINGE (ML) INJECTION EVERY 12 HOURS SCHEDULED
Status: DISCONTINUED | OUTPATIENT
Start: 2024-04-04 | End: 2024-04-04 | Stop reason: HOSPADM

## 2024-04-04 RX ORDER — SODIUM CHLORIDE, SODIUM LACTATE, POTASSIUM CHLORIDE, CALCIUM CHLORIDE 600; 310; 30; 20 MG/100ML; MG/100ML; MG/100ML; MG/100ML
INJECTION, SOLUTION INTRAVENOUS CONTINUOUS
Status: DISCONTINUED | OUTPATIENT
Start: 2024-04-04 | End: 2024-04-04 | Stop reason: HOSPADM

## 2024-04-04 RX ORDER — SODIUM CHLORIDE 9 MG/ML
INJECTION, SOLUTION INTRAVENOUS PRN
Status: DISCONTINUED | OUTPATIENT
Start: 2024-04-04 | End: 2024-04-04 | Stop reason: HOSPADM

## 2024-04-04 RX ORDER — GLYCOPYRROLATE 0.2 MG/ML
INJECTION INTRAMUSCULAR; INTRAVENOUS PRN
Status: DISCONTINUED | OUTPATIENT
Start: 2024-04-04 | End: 2024-04-04 | Stop reason: SDUPTHER

## 2024-04-04 RX ORDER — LIDOCAINE HYDROCHLORIDE 10 MG/ML
1 INJECTION, SOLUTION EPIDURAL; INFILTRATION; INTRACAUDAL; PERINEURAL
Status: DISCONTINUED | OUTPATIENT
Start: 2024-04-04 | End: 2024-04-04 | Stop reason: HOSPADM

## 2024-04-04 RX ORDER — PROPOFOL 10 MG/ML
INJECTION, EMULSION INTRAVENOUS PRN
Status: DISCONTINUED | OUTPATIENT
Start: 2024-04-04 | End: 2024-04-04 | Stop reason: SDUPTHER

## 2024-04-04 RX ORDER — MIDAZOLAM HYDROCHLORIDE 1 MG/ML
INJECTION INTRAMUSCULAR; INTRAVENOUS PRN
Status: DISCONTINUED | OUTPATIENT
Start: 2024-04-04 | End: 2024-04-04 | Stop reason: SDUPTHER

## 2024-04-04 RX ORDER — SODIUM CHLORIDE 0.9 % (FLUSH) 0.9 %
5-40 SYRINGE (ML) INJECTION PRN
Status: DISCONTINUED | OUTPATIENT
Start: 2024-04-04 | End: 2024-04-04 | Stop reason: HOSPADM

## 2024-04-04 RX ADMIN — SODIUM CHLORIDE, POTASSIUM CHLORIDE, SODIUM LACTATE AND CALCIUM CHLORIDE: 600; 310; 30; 20 INJECTION, SOLUTION INTRAVENOUS at 08:07

## 2024-04-04 RX ADMIN — PROPOFOL 50 MG: 10 INJECTION, EMULSION INTRAVENOUS at 09:54

## 2024-04-04 RX ADMIN — PROPOFOL 50 MG: 10 INJECTION, EMULSION INTRAVENOUS at 09:59

## 2024-04-04 RX ADMIN — PROPOFOL 50 MG: 10 INJECTION, EMULSION INTRAVENOUS at 09:39

## 2024-04-04 RX ADMIN — LIDOCAINE HYDROCHLORIDE 50 MG: 20 INJECTION, SOLUTION INFILTRATION; PERINEURAL at 09:22

## 2024-04-04 RX ADMIN — MIDAZOLAM 2 MG: 1 INJECTION INTRAMUSCULAR; INTRAVENOUS at 09:19

## 2024-04-04 RX ADMIN — PROPOFOL 50 MG: 10 INJECTION, EMULSION INTRAVENOUS at 09:45

## 2024-04-04 RX ADMIN — PROPOFOL 50 MG: 10 INJECTION, EMULSION INTRAVENOUS at 09:22

## 2024-04-04 RX ADMIN — PROPOFOL 50 MG: 10 INJECTION, EMULSION INTRAVENOUS at 09:33

## 2024-04-04 RX ADMIN — PROPOFOL 50 MG: 10 INJECTION, EMULSION INTRAVENOUS at 09:28

## 2024-04-04 RX ADMIN — GLYCOPYRROLATE 0.1 MG: 0.2 INJECTION INTRAMUSCULAR; INTRAVENOUS at 09:19

## 2024-04-04 RX ADMIN — PROPOFOL 50 MG: 10 INJECTION, EMULSION INTRAVENOUS at 09:42

## 2024-04-04 RX ADMIN — PROPOFOL 50 MG: 10 INJECTION, EMULSION INTRAVENOUS at 09:50

## 2024-04-04 RX ADMIN — PROPOFOL 50 MG: 10 INJECTION, EMULSION INTRAVENOUS at 09:23

## 2024-04-04 ASSESSMENT — ENCOUNTER SYMPTOMS
NAUSEA: 0
ABDOMINAL PAIN: 0
SINUS PRESSURE: 0
SHORTNESS OF BREATH: 0

## 2024-04-04 ASSESSMENT — PAIN - FUNCTIONAL ASSESSMENT
PAIN_FUNCTIONAL_ASSESSMENT: 0-10
PAIN_FUNCTIONAL_ASSESSMENT: CRITICAL CARE PAIN OBSERVATION TOOL (CPOT)

## 2024-04-04 NOTE — H&P
HISTORY and PHYSICAL  [facility]       NAME:  Amada Avalos  MRN: [id number]   YOB: 1990   Date: 4/4/2024   Age: 33 y.o.  Gender: female       COMPLAINT AND PRESENT HISTORY:       Amada Avalos is 33 y.o.,   female, having a Diagnostic Colonoscopy, for hx of:  Pre-Op Diagnosis Codes:     * Rectal bleeding      No prior Colonoscopy was done,  this is her first time.     Pt admits to occasional  abdominal pain with certain foods including bloating/gas.    No changes in bowel habits.    No diarrhea, constipation, .  Pt has hx of blood in stools a month ago.  She has hx of REBECA is on Iron pills.  She admits to dark colored stools.    No black tarry stools.   Pt denies any  hx of hemorrhoids.    No changes in appetite and unintended weight loss. Hx of gastric bypass surgery.  Pt on Vit B12 & folic acid.   Pt admits to some  nausea or vomiting.   No  heartburn, indigestion or acid reflux.    Pt is not taking Pepcid anymore.    Family Hx of colon cancer in maternal grandfather.     Medical history reviewed:   Patient voices feeling well today. Denies any recent fever or chills, chest pain/pressure.  Pt reports no  SOB.     Patient has been NPO since midnight. No blood thinners      Patient states  has completed and followed prescribed prep with clear outcome.      Patient has hx of prolonged emergence with general anesthesia otherwise  denies any personal or family problems with anesthesia.    RECENT LABS, IMAGING AND TESTING     Lab Results   Component Value Date    WBC 9.8 12/19/2023    RBC 3.78 (L) 12/19/2023    HGB 9.9 (L) 12/19/2023    HCT 33.0 (L) 12/19/2023    MCV 87.3 12/19/2023    MCH 26.2 12/19/2023    MCHC 30.0 12/19/2023    RDW 13.6 12/19/2023     12/19/2023    MPV 9.8 12/19/2023        Lab Results   Component Value Date     12/19/2023    K 4.3 12/19/2023     12/19/2023    CO2 23 12/19/2023    BUN 15 12/19/2023    CREATININE 0.7 12/19/2023    GLUCOSE 82  congestion and sinus pressure.    Respiratory:  Negative for shortness of breath.    Cardiovascular:  Negative for leg swelling.   Gastrointestinal:  Negative for abdominal pain and nausea.   Skin: Negative.    Neurological:  Negative for dizziness.   Psychiatric/Behavioral:  Negative for sleep disturbance.    All other systems reviewed and are negative.        GENERAL PHYSICAL EXAM     Vitals:   Review vitals per RN flowsheet.                              Physical Exam  Constitutional:       General: She is not in acute distress.  HENT:      Head: Normocephalic.      Right Ear: External ear normal.      Left Ear: External ear normal.      Nose: Nose normal.      Mouth/Throat:      Pharynx: No posterior oropharyngeal erythema.   Eyes:      General:         Right eye: No discharge.         Left eye: No discharge.   Cardiovascular:      Rate and Rhythm: Normal rate and regular rhythm.      Heart sounds: Normal heart sounds.   Pulmonary:      Breath sounds: Normal breath sounds.   Abdominal:      General: Bowel sounds are normal.      Tenderness: There is no abdominal tenderness. There is no guarding.      Comments: obese   Neurological:      Mental Status: She is alert and oriented to person, place, and time.   Psychiatric:         Mood and Affect: Mood normal.        PROVISIONAL DIAGNOSES / SURGERY:        COLONOSCOPY DIAGNOSTIC    Pre-Op Diagnosis Codes:     * Rectal bleeding [K62.5]     Patient Active Problem List    Diagnosis Date Noted    H/O abnormal cervical Papanicolaou smear 03/11/2015    Chronic tension-type headache, not intractable 11/18/2022    Chronic midline low back pain without sciatica 11/18/2022    Rh+/RI/GBS- 06/23/2015    Anal warts 06/16/2015    Other folate deficiency anemias 12/19/2023    Umbilical hernia without obstruction and without gangrene     Low vitamin B12 level 10/29/2021    Folic acid deficiency 10/29/2021    Vitamin D deficiency 10/29/2021    History of sleeve gastrectomy 07/23/2021

## 2024-04-04 NOTE — DISCHARGE INSTRUCTIONS
Colonoscopy: What to Expect at Home  Your Recovery  After you have a colonoscopy, you will stay at the clinic for 1 to 2 hours until the medicines wear off. Then you can go home, but you will need to arrange for a ride. Your doctor will tell you when you can eat and do your other usual activities.  Your doctor will talk to you about when you will need your next colonoscopy. The results of your test and your risk for colorectal cancer will help your doctor decide how often you need to be checked.  After the test, you may be bloated or have gas pains. You may need to pass gas. If a biopsy was done or a polyp was removed, you may have streaks of blood in your stool (feces) for a few days.  This care sheet gives you a general idea about how long it will take for you to recover. But each person recovers at a different pace. Follow the steps below to get better as quickly as possible.  How can you care for yourself at home?  Activity  Rest as much as you need to after you go home.  You should be able to go back to your usual activities the day after the test.  Diet  Follow your doctor’s directions for eating.  Drink plenty of fluids (unless your doctor has told you not to) to replace the fluids that were lost during the colon prep.  Do not drink alcohol.  Medicines  If polyps were removed or a biopsy was done during the test, your doctor may tell you not to take aspirin or other anti-inflammatory medicines, such as ibuprofen (Advil, Motrin) and naproxen (Aleve), for a few days.  Other instructions  For your safety, you should not drive or operate machinery until the medicine effects are gone and you can think clearly. Your doctor may tell you not to drive or operate machinery until the day after your test.  Do not sign legal documents or make major decisions until the medicine effects are gone and you can think clearly. The anesthesia medicine can make it hard for you to fully understand what you are agreeing    High-Fiber Diet Eating Guide   Food Category   Foods Recommended   Notes   Grains   Whole-grain breads, muffins, bagels, or danis bread Rye bread Whole-wheat crackers or crisp breads Whole-grain or bran cereals Oatmeal, oat bran, or grits Wheat germ Whole-wheat pasta and brown rice   Read the ingredients list on food labels. Look for products that list \"whole\" as the first ingredient (eg, whole-wheat, whole oats). Choose cereals with at least 2 grams of fiber per serving.   Vegetables   All vegetables, especially asparagus, bean sprouts, broccoli, Berkey sprouts, cabbage, carrots, cauliflower, celery, corn, greens, green beans, green pepper, onions, peas, potatoes (with skin), snow peas, spinach, squash, sweet potatoes, tomatoes, zucchini   For maximum fiber intake, eat the peels of fruits and vegetablesjust be sure to wash them well first.   Fruits   All fruits, especially apples, berries, grapefruits, mangoes, nectarines, oranges, peaches, pears, dried fruits (figs, dates, prunes, raisins)   Choose raw fruits and vegetables over juice, cooked, or cannedraw fruit has more fiber. Dried fruit is also a good source of fiber.   Milk   With the exception of yogurt containing inulin (a type of fiber), dairy foods provide little fiber.   Add more fiber by topping your yogurt or cottage cheese with fresh fruit, whole grain or bran cereals, nuts, or seeds.   Meats and Beans   All beans and peas, especially Garbanzo beans, kidney beans, lentils, lima beans, split peas, and gutierrez beans All nuts and seeds, especially almonds, peanuts, Brazil nuts, cashews, peanut butter, walnuts, sesame and sunflower seeds All meat, poultry, fish, and eggs   Increase fiber in meat dishes by adding gutierrez beans, kidney beans, black-eyed peas, bran, or oatmeal. If you are following a low-fat diet, use nuts and seeds only in moderation.   Fats and Oils   All in moderation   Fats and oils do not provide fiber   Snacks, Sweets, and Condiments

## 2024-04-04 NOTE — ANESTHESIA POSTPROCEDURE EVALUATION
Department of Anesthesiology  Postprocedure Note    Patient: Amada Avalos  MRN: [id number]  YOB: 1990  Date of evaluation: 4/4/2024    Procedure Summary       Date: 04/04/24 Room / Location: [place] [address] / [facility]    Anesthesia Start: 0919 Anesthesia Stop: 1006    Procedure: COLONOSCOPY POLYPECTOMY SNARE/COLD BIOPSY Diagnosis:       Rectal bleeding      (Rectal bleeding [K62.5])    Surgeons: Kelsea Brush MD Responsible Provider: Benjamin Clayton MD    Anesthesia Type: General ASA Status: 3            Anesthesia Type: General    Amber Phase I:      Amber Phase II: Amber Score: 10    Anesthesia Post Evaluation    Patient location during evaluation: PACU  Patient participation: complete - patient participated  Level of consciousness: awake and alert  Airway patency: patent  Nausea & Vomiting: no vomiting  Cardiovascular status: hemodynamically stable  Respiratory status: acceptable  Hydration status: euvolemic  Comments: POST- ANESTHESIA EVALUATION       Pt Name: Amada Avalos  MRN: [id number]  YOB: 1990  Date of evaluation: 4/4/2024  Time:  12:34 PM      /89   Pulse 62   Temp 97.5 °F (36.4 °C) (Infrared)   Resp 20   Ht 1.676 m (5' 6\")   Wt (!) 139.7 kg (308 lb)   SpO2 98%   BMI 49.71 kg/m²      Consciousness Level  Awake  Cardiopulmonary Status  Stable  Pain Adequately Treated YES  Nausea / Vomiting  NO  Adequate Hydration  YES  Anesthesia Related Complications NONE      Electronically signed by Benjamin Clayton MD on 4/4/2024 at 12:34 PM         Pain management: satisfactory to patient    No notable events documented.

## 2024-04-04 NOTE — OP NOTE
Colonoscopy report    Colonoscopy Procedure Note    Procedure:  Colonoscopy with polypectomy with snare    Procedure Date: 4/4/2024    Indications:  Rectal bleeding    Sedation:  MAC    Attending Physician:  Dr. Kelsea Brush MD.     Assistant Physician: None    Consent:   Informed consent was obtained for the procedure after explaining the risks including bleeding, perforation, aspiration, arrhythmia, risks related to sedation, reaction to medications and rarely death, benefits and alternatives to the patient. The patient verbalized understanding and agreed to proceed with the procedure.       Procedure Details:  The patient was placed in the left lateral decubitus position.  Oxygen and cardiac monitoring equipment was attached. The patient's vital signs were monitored continuously  throughout the procedure. After appropriate sedation was achieved, a rectal examination was performed.  The colonoscope was inserted into the rectum and advanced under direct vision to the terminal ileum.  The quality of the colonic preparation was poor.  A careful inspection was made as the colonoscope was withdrawn, including a retroflexed view of the rectum; findings and interventions are described below.  Appropriate photodocumentation was obtained.           Complications:  None    Estimated blood loss:  Minimal           Disposition:  Home           Condition: stable    Withdrawal Time: 9 minutes    Findings:   The bowel prep was poor.  A 5 mm polyp noted in the rectum, this was removed with cold snare.  Retroflexion examination in the rectum with small internal hemorrhoids    Specimen Removed: Rectal polyp    Endoscopic Impression:    Poor bowel prep.  5 mm rectal polyp removed with cold snare.  Small internal hemorrhoid    Recommendations:  Follow pathology results.  If the pathology shows adenoma, repeat colonoscopy in 7 to 10 years, if negative, consider repeat at the age of 44 as age-appropriate colorectal cancer  screening    Attending Attestation: I performed the procedure.    Kelsea Brush MD

## 2024-04-04 NOTE — ANESTHESIA PRE PROCEDURE
Department of Anesthesiology  Preprocedure Note       Name:  Amada Avalos   Age:  33 y.o.  :  1990                                          MRN:  [id number]         Date:  2024      Surgeon: Surgeon(s):  Kelsea Brush MD    Procedure: Procedure(s):  COLONOSCOPY DIAGNOSTIC    Medications prior to admission:   Prior to Admission medications    Medication Sig Start Date End Date Taking? Authorizing Provider   PEG 3350-KCl-NaBcb-NaCl-NaSulf (PEG-3350/ELECTROLYTES) 236 g SOLR take 8000 milliliters by mouth ONCE for 1 DOSE take as directed for 2 days BOWEL PREP  Patient not taking: Reported on 3/21/2024 12/19/23   Provider, MD Barbie   folic acid (FOLVITE) 1 MG tablet Take 1 tablet by mouth daily  Patient not taking: Reported on 3/21/2024 12/19/23   Monica Nicole APRN - CNP   vitamin B-12 (CYANOCOBALAMIN) 1000 MCG tablet Take 1 tablet by mouth daily 23   Monica Nicole APRN - CNP   Elastic Bandages & Supports (WRIST SPLINT) MISC Apply 1 each topically continuous Cock up wrist splint 23   Monica Nicole APRN - CNP   ondansetron (ZOFRAN-ODT) 4 MG disintegrating tablet Take 1 tablet by mouth 3 times daily as needed for Nausea or Vomiting 23   Valdo Dunn MD   b complex vitamins capsule Take 1 capsule by mouth daily With thiamine pleasse  Patient not taking: Reported on 3/21/2024 8/22/23   Valdo Dunn MD   famotidine (PEPCID) 20 MG tablet Take 1 tablet by mouth 2 times daily as needed (GERD)  Patient not taking: Reported on 3/21/2024 4/25/23   Monica Nicole APRN - CNP       Current medications:    Current Facility-Administered Medications   Medication Dose Route Frequency Provider Last Rate Last Admin   • lidocaine PF 1 % injection 1 mL  1 mL IntraDERmal Once PRN Doni Crandall MD       • lactated ringers IV soln infusion   IntraVENous Continuous Doni Crandall MD       • sodium chloride flush 0.9 % injection 5-40 mL  5-40 mL IntraVENous 2 times per day Karley

## 2024-04-08 LAB — SURGICAL PATHOLOGY REPORT: NORMAL

## 2024-08-16 ENCOUNTER — TELEPHONE (OUTPATIENT)
Dept: FAMILY MEDICINE CLINIC | Age: 34
End: 2024-08-16

## 2024-08-16 NOTE — TELEPHONE ENCOUNTER
Patient called in stating she wanted to schedule a new patient appointment with Magali Dalal. She previous saw Monica Nicole and received a letter. Explained to patient she was discharged from the office and she would need to find care someone else. Patient verbalized understand.

## 2024-10-01 ENCOUNTER — HOSPITAL ENCOUNTER (OUTPATIENT)
Age: 34
Setting detail: SPECIMEN
Discharge: HOME | End: 2024-10-01

## 2024-10-01 ENCOUNTER — OFFICE VISIT (OUTPATIENT)
Dept: OBGYN CLINIC | Age: 34
End: 2024-10-01
Payer: MEDICAID

## 2024-10-01 VITALS
DIASTOLIC BLOOD PRESSURE: 70 MMHG | WEIGHT: 293 LBS | BODY MASS INDEX: 47.09 KG/M2 | HEIGHT: 66 IN | SYSTOLIC BLOOD PRESSURE: 124 MMHG

## 2024-10-01 DIAGNOSIS — N89.8 VAGINAL DISCHARGE: ICD-10-CM

## 2024-10-01 DIAGNOSIS — Z11.51 SPECIAL SCREENING EXAMINATION FOR HUMAN PAPILLOMAVIRUS (HPV): ICD-10-CM

## 2024-10-01 DIAGNOSIS — Z01.419 WELL FEMALE EXAM WITH ROUTINE GYNECOLOGICAL EXAM: Primary | ICD-10-CM

## 2024-10-01 DIAGNOSIS — L53.9 ERYTHEMA: ICD-10-CM

## 2024-10-01 DIAGNOSIS — N76.0 ACUTE VAGINITIS: ICD-10-CM

## 2024-10-01 PROCEDURE — 99213 OFFICE O/P EST LOW 20 MIN: CPT | Performed by: NURSE PRACTITIONER

## 2024-10-01 PROCEDURE — G8417 CALC BMI ABV UP PARAM F/U: HCPCS | Performed by: NURSE PRACTITIONER

## 2024-10-01 PROCEDURE — G8484 FLU IMMUNIZE NO ADMIN: HCPCS | Performed by: NURSE PRACTITIONER

## 2024-10-01 PROCEDURE — G8427 DOCREV CUR MEDS BY ELIG CLIN: HCPCS | Performed by: NURSE PRACTITIONER

## 2024-10-01 PROCEDURE — 1036F TOBACCO NON-USER: CPT | Performed by: NURSE PRACTITIONER

## 2024-10-01 PROCEDURE — 99395 PREV VISIT EST AGE 18-39: CPT | Performed by: NURSE PRACTITIONER

## 2024-10-01 RX ORDER — CLOTRIMAZOLE AND BETAMETHASONE DIPROPIONATE 10; .64 MG/G; MG/G
CREAM TOPICAL
Qty: 45 G | Refills: 1 | Status: SHIPPED | OUTPATIENT
Start: 2024-10-01

## 2024-10-01 RX ORDER — NYSTATIN 100000 [USP'U]/G
POWDER TOPICAL
Qty: 1 EACH | Refills: 1 | Status: SHIPPED | OUTPATIENT
Start: 2024-10-01

## 2024-10-01 ASSESSMENT — PATIENT HEALTH QUESTIONNAIRE - PHQ9
SUM OF ALL RESPONSES TO PHQ QUESTIONS 1-9: 0
SUM OF ALL RESPONSES TO PHQ QUESTIONS 1-9: 0
1. LITTLE INTEREST OR PLEASURE IN DOING THINGS: NOT AT ALL
SUM OF ALL RESPONSES TO PHQ QUESTIONS 1-9: 0
2. FEELING DOWN, DEPRESSED OR HOPELESS: NOT AT ALL
SUM OF ALL RESPONSES TO PHQ9 QUESTIONS 1 & 2: 0
SUM OF ALL RESPONSES TO PHQ QUESTIONS 1-9: 0

## 2024-10-01 NOTE — PROGRESS NOTES
appropriate for the patients age.        ASSESSMENT:      33 y.o. Annual   Diagnosis Orders   1. Well female exam with routine gynecological exam  PAP SMEAR      2. Special screening examination for human papillomavirus (HPV)  PAP SMEAR      3. Vaginal discharge        4. Acute vaginitis        5. Erythema  nystatin (MYCOSTATIN) 766292 UNIT/GM powder    clotrimazole-betamethasone (LOTRISONE) 1-0.05 % cream             Chief Complaint   Patient presents with    Annual Exam          Past Medical History:   Diagnosis Date    Abnormal Pap smear of cervix 03/11/2015    Anemia complicating pregnancy in second trimester 03/11/2015    COVID-19 12/2021 12/30/2021 night sweats, cough, sore throat x 1 week    Folic acid deficiency     Gastroesophageal reflux disease 09/05/2019    on rx    Morbidly obese     Prolonged emergence from general anesthesia     Snores     no sleep apnea    Wellness examination     Monica Nicole APRN-CNP [address], [place]  last seen 2021  . Seeing 2/24/22 for clearance.          Patient Active Problem List    Diagnosis Date Noted    H/O abnormal cervical Papanicolaou smear 03/11/2015     Priority: High     3/11/2015 Colpo with physician      Chronic tension-type headache, not intractable 11/18/2022     Priority: Medium    Chronic midline low back pain without sciatica 11/18/2022     Priority: Medium    Rh+/RI/GBS- 06/23/2015     Priority: Medium    Anal warts 06/16/2015     Priority: Medium    Other folate deficiency anemias 12/19/2023    Umbilical hernia without obstruction and without gangrene     Low vitamin B12 level 10/29/2021    Folic acid deficiency 10/29/2021    Vitamin D deficiency 10/29/2021    History of sleeve gastrectomy 07/23/2021    Morbid obesity     S/P gastric bypass     Hiatal hernia with GERD and esophagitis 09/05/2019    H/O LGSIL (1/29/15) 09/28/2015     Negative colpo on 4/7/15            Hereditary Breast, Ovarian, Colon and Uterine Cancer screening Done.          Tobacco &

## 2024-10-02 ENCOUNTER — OFFICE VISIT (OUTPATIENT)
Dept: PRIMARY CARE CLINIC | Age: 34
End: 2024-10-02

## 2024-10-02 VITALS
DIASTOLIC BLOOD PRESSURE: 68 MMHG | HEART RATE: 73 BPM | OXYGEN SATURATION: 99 % | RESPIRATION RATE: 16 BRPM | SYSTOLIC BLOOD PRESSURE: 102 MMHG | HEIGHT: 67 IN | WEIGHT: 293 LBS | BODY MASS INDEX: 45.99 KG/M2

## 2024-10-02 DIAGNOSIS — Z98.84 S/P GASTRIC BYPASS: ICD-10-CM

## 2024-10-02 DIAGNOSIS — G89.29 CHRONIC MIDLINE LOW BACK PAIN WITHOUT SCIATICA: Primary | ICD-10-CM

## 2024-10-02 DIAGNOSIS — E66.9 OBESITY, UNSPECIFIED CLASS, UNSPECIFIED OBESITY TYPE, UNSPECIFIED WHETHER SERIOUS COMORBIDITY PRESENT: ICD-10-CM

## 2024-10-02 DIAGNOSIS — M54.50 CHRONIC MIDLINE LOW BACK PAIN WITHOUT SCIATICA: Primary | ICD-10-CM

## 2024-10-02 RX ORDER — CYCLOBENZAPRINE HCL 10 MG
10 TABLET ORAL NIGHTLY PRN
Qty: 30 TABLET | Refills: 2 | Status: SHIPPED | OUTPATIENT
Start: 2024-10-02 | End: 2024-10-12

## 2024-10-02 SDOH — ECONOMIC STABILITY: FOOD INSECURITY: WITHIN THE PAST 12 MONTHS, YOU WORRIED THAT YOUR FOOD WOULD RUN OUT BEFORE YOU GOT MONEY TO BUY MORE.: NEVER TRUE

## 2024-10-02 SDOH — ECONOMIC STABILITY: FOOD INSECURITY: WITHIN THE PAST 12 MONTHS, THE FOOD YOU BOUGHT JUST DIDN'T LAST AND YOU DIDN'T HAVE MONEY TO GET MORE.: NEVER TRUE

## 2024-10-02 SDOH — ECONOMIC STABILITY: INCOME INSECURITY: HOW HARD IS IT FOR YOU TO PAY FOR THE VERY BASICS LIKE FOOD, HOUSING, MEDICAL CARE, AND HEATING?: NOT HARD AT ALL

## 2024-10-02 ASSESSMENT — PATIENT HEALTH QUESTIONNAIRE - PHQ9
SUM OF ALL RESPONSES TO PHQ QUESTIONS 1-9: 0
1. LITTLE INTEREST OR PLEASURE IN DOING THINGS: NOT AT ALL
SUM OF ALL RESPONSES TO PHQ9 QUESTIONS 1 & 2: 0
2. FEELING DOWN, DEPRESSED OR HOPELESS: NOT AT ALL
SUM OF ALL RESPONSES TO PHQ QUESTIONS 1-9: 0

## 2024-10-02 ASSESSMENT — ENCOUNTER SYMPTOMS
COUGH: 0
SHORTNESS OF BREATH: 0
ABDOMINAL PAIN: 0
BACK PAIN: 1

## 2024-10-02 NOTE — PROGRESS NOTES
[facility]  [facility]  [address] [place] [address]   [address]  [facility] [address]  Dept: [phone]  Dept Fax: [phone]    Amada Avalos is a 33 y.o. female who presentstoday for her medical conditions/complaints as noted below.  Amada Avalos is c/o of  Chief Complaint   Patient presents with    Establish Care    Weight Management         HPI:     Presents for new patient/est care  BP well controlled  BMI 48    Hx of gastric sleeve with Dr. Cuevas in 2019  Revision to bypass with Dr. Ramos in 2022  Continues to have issues with weight management  Would like to consider GLP 1 use, does not look like insurance will cover  Hga1c WNL in 12/2023  Wiling to return to bariatrics for further eval- referral made    Chronic lower back pain, mostly left sided  Denies any mechanism of injury  Willing to meet with PT  Willing to trial muscle relaxer    Denies any other problems/concerns      Hemoglobin A1C (%)   Date Value   12/19/2023 5.0   10/28/2021 5.0   11/25/2019 4.8             ( goal A1C is < 7)   No components found for: \"LABMICR\"  No components found for: \"LDLCHOLESTEROL\", \"LDLCALC\"    (goal LDL is <100)   AST (U/L)   Date Value   12/19/2023 16     ALT (U/L)   Date Value   12/19/2023 13     BUN (mg/dL)   Date Value   12/19/2023 15     BP Readings from Last 3 Encounters:   10/02/24 102/68   10/01/24 124/70   04/04/24 137/89          ([id number])    Past Medical History:   Diagnosis Date    Abnormal Pap smear of cervix 03/11/2015    Anemia complicating pregnancy in second trimester 03/11/2015    COVID-19 12/2021 12/30/2021 night sweats, cough, sore throat x 1 week    Folic acid deficiency     Gastroesophageal reflux disease 09/05/2019    on rx    Morbidly obese     Prolonged emergence from general anesthesia     Snores     no sleep apnea    Wellness examination     Monica Nicole APRN-CNP [address], [place]  last seen 2021  . Seeing 2/24/22 for clearance.       Past

## 2024-10-03 LAB
HPV I/H RISK 4 DNA CVX QL NAA+PROBE: NOT DETECTED
HPV SAMPLE: NORMAL
HPV, INTERPRETATION: NORMAL
HPV16 DNA CVX QL NAA+PROBE: NOT DETECTED
HPV18 DNA CVX QL NAA+PROBE: NOT DETECTED
SPECIMEN DESCRIPTION: NORMAL

## 2024-10-09 LAB — CYTOLOGY REPORT: NORMAL

## 2024-10-14 ENCOUNTER — OFFICE VISIT (OUTPATIENT)
Dept: BARIATRICS/WEIGHT MGMT | Age: 34
End: 2024-10-14
Payer: MEDICAID

## 2024-10-14 ENCOUNTER — HOSPITAL ENCOUNTER (OUTPATIENT)
Age: 34
Setting detail: SPECIMEN
Discharge: HOME | End: 2024-10-14

## 2024-10-14 ENCOUNTER — TELEPHONE (OUTPATIENT)
Dept: BARIATRICS/WEIGHT MGMT | Age: 34
End: 2024-10-14

## 2024-10-14 VITALS
HEIGHT: 67 IN | DIASTOLIC BLOOD PRESSURE: 80 MMHG | BODY MASS INDEX: 45.99 KG/M2 | SYSTOLIC BLOOD PRESSURE: 110 MMHG | WEIGHT: 293 LBS | HEART RATE: 70 BPM

## 2024-10-14 DIAGNOSIS — K90.9 IRON MALABSORPTION: ICD-10-CM

## 2024-10-14 DIAGNOSIS — E55.9 VITAMIN D DEFICIENCY: ICD-10-CM

## 2024-10-14 DIAGNOSIS — R73.9 ELEVATED BLOOD SUGAR: ICD-10-CM

## 2024-10-14 DIAGNOSIS — D50.8 IRON DEFICIENCY ANEMIA SECONDARY TO INADEQUATE DIETARY IRON INTAKE: ICD-10-CM

## 2024-10-14 DIAGNOSIS — E53.8 FOLIC ACID DEFICIENCY: ICD-10-CM

## 2024-10-14 DIAGNOSIS — Z98.84 S/P GASTRIC BYPASS: ICD-10-CM

## 2024-10-14 DIAGNOSIS — D52.8 OTHER FOLATE DEFICIENCY ANEMIAS: ICD-10-CM

## 2024-10-14 DIAGNOSIS — K90.9 IRON MALABSORPTION: Primary | ICD-10-CM

## 2024-10-14 DIAGNOSIS — R79.89 LOW VITAMIN B12 LEVEL: ICD-10-CM

## 2024-10-14 DIAGNOSIS — R06.83 SNORING: ICD-10-CM

## 2024-10-14 DIAGNOSIS — G47.19 EXCESSIVE DAYTIME SLEEPINESS: ICD-10-CM

## 2024-10-14 DIAGNOSIS — E66.01 MORBID OBESITY WITH BMI OF 45.0-49.9, ADULT: ICD-10-CM

## 2024-10-14 LAB
25(OH)D3 SERPL-MCNC: 11 NG/ML (ref 30–100)
ALBUMIN SERPL-MCNC: 4 G/DL (ref 3.5–5.2)
ALBUMIN/GLOB SERPL: 1 {RATIO} (ref 1–2.5)
ALP SERPL-CCNC: 101 U/L (ref 35–104)
ALT SERPL-CCNC: 13 U/L (ref 10–35)
ANION GAP SERPL CALCULATED.3IONS-SCNC: 12 MMOL/L (ref 9–16)
AST SERPL-CCNC: 16 U/L (ref 10–35)
BASOPHILS # BLD: 0 K/UL (ref 0–0.2)
BASOPHILS NFR BLD: 0 % (ref 0–2)
BILIRUB SERPL-MCNC: 0.3 MG/DL (ref 0–1.2)
BUN SERPL-MCNC: 15 MG/DL (ref 6–20)
CALCIUM SERPL-MCNC: 8.6 MG/DL (ref 8.6–10.4)
CHLORIDE SERPL-SCNC: 103 MMOL/L (ref 98–107)
CHOLEST SERPL-MCNC: 169 MG/DL (ref 0–199)
CHOLESTEROL/HDL RATIO: 3
CO2 SERPL-SCNC: 24 MMOL/L (ref 20–31)
CREAT SERPL-MCNC: 0.8 MG/DL (ref 0.5–0.9)
EOSINOPHIL # BLD: 0.11 K/UL (ref 0–0.4)
EOSINOPHILS RELATIVE PERCENT: 1 % (ref 1–4)
ERYTHROCYTE [DISTWIDTH] IN BLOOD BY AUTOMATED COUNT: 16 % (ref 11.8–14.4)
EST. AVERAGE GLUCOSE BLD GHB EST-MCNC: 103 MG/DL
FERRITIN SERPL-MCNC: 143 NG/ML (ref 15–150)
FOLATE SERPL-MCNC: 4.7 NG/ML (ref 4.8–24.2)
GFR, ESTIMATED: >90 ML/MIN/1.73M2
GLUCOSE SERPL-MCNC: 73 MG/DL (ref 74–99)
HBA1C MFR BLD: 5.2 % (ref 4–6)
HCT VFR BLD AUTO: 33.8 % (ref 36.3–47.1)
HDLC SERPL-MCNC: 63 MG/DL
HGB BLD-MCNC: 9.7 G/DL (ref 11.9–15.1)
IMM GRANULOCYTES # BLD AUTO: 0 K/UL (ref 0–0.3)
IMM GRANULOCYTES NFR BLD: 0 %
IRON SATN MFR SERPL: 12 % (ref 20–55)
IRON SERPL-MCNC: 40 UG/DL (ref 37–145)
LDLC SERPL CALC-MCNC: 95 MG/DL (ref 0–100)
LYMPHOCYTES NFR BLD: 4.24 K/UL (ref 1–4.8)
LYMPHOCYTES RELATIVE PERCENT: 40 % (ref 24–44)
MAGNESIUM SERPL-MCNC: 2.2 MG/DL (ref 1.6–2.6)
MCH RBC QN AUTO: 23.5 PG (ref 25.2–33.5)
MCHC RBC AUTO-ENTMCNC: 28.7 G/DL (ref 28.4–34.8)
MCV RBC AUTO: 82 FL (ref 82.6–102.9)
MONOCYTES NFR BLD: 0.32 K/UL (ref 0.1–0.8)
MONOCYTES NFR BLD: 3 % (ref 1–7)
MORPHOLOGY: ABNORMAL
MORPHOLOGY: ABNORMAL
NEUTROPHILS NFR BLD: 56 % (ref 36–66)
NEUTS SEG NFR BLD: 5.93 K/UL (ref 1.8–7.7)
NRBC BLD-RTO: 0 PER 100 WBC
PLATELET # BLD AUTO: 468 K/UL (ref 138–453)
PMV BLD AUTO: 10.1 FL (ref 8.1–13.5)
POTASSIUM SERPL-SCNC: 4.8 MMOL/L (ref 3.7–5.3)
PROT SERPL-MCNC: 7.9 G/DL (ref 6.6–8.7)
PTH-INTACT SERPL-MCNC: 133 PG/ML (ref 15–65)
RBC # BLD AUTO: 4.12 M/UL (ref 3.95–5.11)
SODIUM SERPL-SCNC: 139 MMOL/L (ref 136–145)
TIBC SERPL-MCNC: 333 UG/DL (ref 250–450)
TRIGL SERPL-MCNC: 55 MG/DL
TSH SERPL DL<=0.05 MIU/L-ACNC: 2.21 UIU/ML (ref 0.27–4.2)
UNSATURATED IRON BINDING CAPACITY: 293 UG/DL (ref 112–347)
VIT B12 SERPL-MCNC: 443 PG/ML (ref 232–1245)
VLDLC SERPL CALC-MCNC: 11 MG/DL
WBC OTHER # BLD: 10.6 K/UL (ref 3.5–11.3)

## 2024-10-14 PROCEDURE — 1036F TOBACCO NON-USER: CPT | Performed by: NURSE PRACTITIONER

## 2024-10-14 PROCEDURE — G8417 CALC BMI ABV UP PARAM F/U: HCPCS | Performed by: NURSE PRACTITIONER

## 2024-10-14 PROCEDURE — G8427 DOCREV CUR MEDS BY ELIG CLIN: HCPCS | Performed by: NURSE PRACTITIONER

## 2024-10-14 PROCEDURE — G8484 FLU IMMUNIZE NO ADMIN: HCPCS | Performed by: NURSE PRACTITIONER

## 2024-10-14 PROCEDURE — 99215 OFFICE O/P EST HI 40 MIN: CPT | Performed by: NURSE PRACTITIONER

## 2024-10-14 RX ORDER — LANOLIN ALCOHOL/MO/W.PET/CERES
400 CREAM (GRAM) TOPICAL DAILY
Qty: 30 TABLET | Refills: 0 | Status: SHIPPED | OUTPATIENT
Start: 2024-10-14

## 2024-10-14 RX ORDER — CYCLOBENZAPRINE HCL 10 MG
10 TABLET ORAL 3 TIMES DAILY PRN
COMMUNITY
Start: 2024-10-02

## 2024-10-14 RX ORDER — ERGOCALCIFEROL 1.25 MG/1
50000 CAPSULE, LIQUID FILLED ORAL WEEKLY
Qty: 12 CAPSULE | Refills: 0 | Status: SHIPPED | OUTPATIENT
Start: 2024-10-14

## 2024-10-14 NOTE — TELEPHONE ENCOUNTER
Patient had her lab work drawn today and the patient is very concerned about her pth. I noticed all of the labs are not resulted and told her that we need all the results before we can analyze her results.

## 2024-10-14 NOTE — PROGRESS NOTES
Dysphagia   [] Melena   [] BRBPR  [] Vomiting   [] Abdominal Pain   [] Diarrhea     [] Constipation  [] Other:    Muskuloskeletal Negative for: [] Joint pain   [] Back pain   [] Knee Pain   [] Muscle weakness [x] Hernia   [x] Edema [] Other:     Positive for: [] Joint pain   [] Back pain   [] Knee Pain   [] Muscle weakness [] Hernia   [] Edema [] Other:    Neurologic Negative for: [x] Syncope   [] Insomnia   [x] Being treated for depression  [] Other:     Positive for: [] Syncope   [] Insomnia   [] Being treated for depression  [] Other:    Skin Negative for: [x] Wound:   [] Open   [] Draining   [] Incisional     [x] Rash   [] Hair Loss  [] Other:     Positive for: [] Wound:   [] Open   [] Draining    [] Incisional  [] Rash   [] Hair Loss  [] Other:          Physical Assessment:     /80 (Site: Right Upper Arm, Position: Sitting, Cuff Size: Large Adult)   Pulse 70   Ht 1.702 m (5' 7\")   Wt (!) 137.4 kg (303 lb)   LMP 09/28/2024 (Exact Date)   BMI 47.46 kg/m²     ***  Assessment & Plan:      1. Iron malabsorption    2. Iron deficiency anemia secondary to inadequate dietary iron intake    3. Vitamin D deficiency    4. S/P gastric bypass    5. Low vitamin B12 level    6. Folic acid deficiency    7. Elevated blood sugar    8. Other folate deficiency anemias           [x] Advance Diet    [x] Daily protein (65-75gm/day)   [x] Take Vitamins   [x] Attend Support Group    [x] Exercise Regularly   [x] Use contraception    Diet progression discussed  Need for long term compliance and follow up stressed to patient  Dietician consult  Continue taking daily Vitamins  Continue using ****, as prescribed  Doing well    Follow up: No follow-ups on file.    Orders placed this encounter:   Orders Placed This Encounter   Procedures    CBC with Auto Differential    Comprehensive Metabolic Panel    Ferritin    Hemoglobin A1C    Iron and TIBC    Lipid Panel    Magnesium    PTH, Intact    TSH    Vitamin A    Vitamin B1    Vitamin 
help with weight loss. Discussed the medication will help with appetite, feeling full on smaller amts but she is already having a low appetite. Educated to avoid skipping meals and having a source of protein with all meals and snacks.   Discussed calcium should be 600mg BID and encourage to have calcium with vitamin d  Blood work is fasting overnight, 10-12 hours. Advised the patient they can have water in the morning but no food or sugary drinks.   Will contact with results and further instructions  Discussed to stop regular MVI and prenatal and start a bariatric MVI with iron.   Discussed both options of adipex and Semaglutide through [facility]. She would like to compounding semaglutide.   Doing well    Follow up: Return in about 4 weeks (around 11/11/2024) for semaglutide med f/u, back on track, gastric bypass.    Orders placed this encounter:   Orders Placed This Encounter   Procedures    CBC with Auto Differential    Comprehensive Metabolic Panel    Ferritin    Hemoglobin A1C    Iron and TIBC    Lipid Panel    Magnesium    PTH, Intact    TSH    Vitamin A    Vitamin B1    Vitamin B12 & Folate    Vitamin D 25 Hydroxy    Zinc       New Prescriptions:   Orders Placed This Encounter   Medications    Semaglutide-Weight Management (WEGOVY) 0.25 MG/0.5ML SOAJ SC injection     Sig: Inject 0.3 mg into the skin every 7 days     Dispense:  2 mL     Refill:  0     Semaglutide through [facility]        Electronically signed by PRADIP Oswald CNP on 10/14/2024 at 8:56 AM    Please note that this chart was generated using voice recognition Dragon dictation software.  Although every effort was made to ensure the accuracy of this automated transcription, some errors in transcription may have occurred.    44 minutes spent total on this encounter including reviewing labs, reviewing notes from other providers, reviewing previous medication use, discuss of current medication options,

## 2024-10-16 LAB
RETINYL PALMITATE: <0.02 MG/L (ref 0–0.1)
VITAMIN A LEVEL: 0.13 MG/L (ref 0.3–1.2)
VITAMIN A, INTERP: ABNORMAL
ZINC SERPL-MCNC: 74.5 UG/DL (ref 60–120)

## 2024-10-17 LAB — VIT B1 PYROPHOSHATE BLD-SCNC: 94 NMOL/L (ref 70–180)

## 2024-10-17 RX ORDER — FERROUS SULFATE 325(65) MG
325 TABLET ORAL
Qty: 30 TABLET | Refills: 0 | Status: SHIPPED | OUTPATIENT
Start: 2024-10-17

## 2024-11-11 ENCOUNTER — OFFICE VISIT (OUTPATIENT)
Dept: BARIATRICS/WEIGHT MGMT | Age: 34
End: 2024-11-11
Payer: MEDICAID

## 2024-11-11 VITALS
OXYGEN SATURATION: 99 % | HEIGHT: 67 IN | SYSTOLIC BLOOD PRESSURE: 122 MMHG | HEART RATE: 81 BPM | DIASTOLIC BLOOD PRESSURE: 85 MMHG | WEIGHT: 293 LBS | BODY MASS INDEX: 45.99 KG/M2

## 2024-11-11 DIAGNOSIS — E53.8 FOLIC ACID DEFICIENCY: ICD-10-CM

## 2024-11-11 DIAGNOSIS — E66.01 MORBID OBESITY WITH BMI OF 45.0-49.9, ADULT: ICD-10-CM

## 2024-11-11 DIAGNOSIS — Z98.84 S/P GASTRIC BYPASS: ICD-10-CM

## 2024-11-11 DIAGNOSIS — E55.9 VITAMIN D DEFICIENCY: ICD-10-CM

## 2024-11-11 DIAGNOSIS — R79.89 LOW SERUM VITAMIN A: ICD-10-CM

## 2024-11-11 DIAGNOSIS — D50.8 IRON DEFICIENCY ANEMIA SECONDARY TO INADEQUATE DIETARY IRON INTAKE: Primary | ICD-10-CM

## 2024-11-11 PROCEDURE — G8484 FLU IMMUNIZE NO ADMIN: HCPCS | Performed by: NURSE PRACTITIONER

## 2024-11-11 PROCEDURE — G8417 CALC BMI ABV UP PARAM F/U: HCPCS | Performed by: NURSE PRACTITIONER

## 2024-11-11 PROCEDURE — 1036F TOBACCO NON-USER: CPT | Performed by: NURSE PRACTITIONER

## 2024-11-11 PROCEDURE — 99214 OFFICE O/P EST MOD 30 MIN: CPT | Performed by: NURSE PRACTITIONER

## 2024-11-11 PROCEDURE — G8427 DOCREV CUR MEDS BY ELIG CLIN: HCPCS | Performed by: NURSE PRACTITIONER

## 2024-11-11 NOTE — PROGRESS NOTES
[facility] INVASIVE BARIATRIC SURG  [address]  [address]  [facility] [address]  Dept: [phone]    SURGICAL WEIGHT LOSS MANAGEMENT PROGRAM  PROGRESS NOTE     CC: Weight Loss    Patient: Amada Avalos      Service Date: 11/11/2024  Visit:   2 1/2 post op    Medical Record #: [id number]  Date of Surgery: 3/7/2022    History: 33 y.o. female who presents today for a post op follow up for revision to gastric bypass. Patient reports regular bowel movements. Patient denies nausea, vomiting, fever, and chills. Patient denies any pain concerns. She has had weight regain.   Back on track started October 2024.     Medications: Semaglutide through [facility]  Weight loss in the last month: 0 lbs. +1  Weight loss total since starting medication: 0 lbs  Dose: 0.3 mg weekly  Side effects:  none.       She has been getting more protein in during the day.     Compliant with bariatric MVI and Calcium? She doesn't take a bariatric MVI. She takes calcium 1x per day. She is unsure what the dose is or what brand.   She takes a prenatal and another MVI with iron daily. She takes B12 vitamin daily.     Recent labs? Folate low - she started OTC Folic Acid. Iron Sat was low - I started on daily ferrous sulfate. Vitamin D was low - she is taking the high dose weekly supplement. Vitamin A was low - she is taking OTC vitamin a. She wasn't previously on bariatric vitamin,    She has started bariatric MVI with iron.     She has noticed she is getting full a little quicker then before. She has had 3 doses and is supposed to be taking her 3rd dose today.     She still needs to schedule home sleep study. She said they did reach out.       Height: 1.702 m (5' 7\")  Highest Weight: 304 lbs      Current Visit Weight Information  Weight: (!) 138.3 kg (305 lb)   BMI: Body mass index is 47.77 kg/m².    Weight loss since surgery:  0 lbs. + 1 lb      Exercising?   [x] Yes

## 2024-11-11 NOTE — PROGRESS NOTES
Medical Nutrition Therapy for Metabolic and Bariatric Surgery  Nutrition Follow-Up: Weight Loss    Nutrition Assessment:  Patient is here for medication follow up, patient had the Revision in 2022.  Patient is taking semaglutide per CAREN Oswald for weight loss.   Patient reports tolerating diet, except bread. never eating small, frequent meals, always eating protein first, always protein portion with all meals and snacks.   Patient is drinking at least 48-64 oz of fluid and sugar free beverages. Patient typically drinking water, diet coke (2 cans, 3 times per week).  Patient reports recently made adjustments to vitamins and minerals.   Patient reports walking, elliptical over the past month to remain active.   Discussed recommendation of protein first, followed by a vegetable, then fruit and save grain portion for last. Work on increasing water and decrease soda intake.  Provided Snack Ideas List Handout(s).    24-hr diet recall scanned into chart.    Multivitamin/Mineral Intake: Per NP, staying on additional iron, Vit A and folate for another month. Started bariatric MVI with iron recently.     Calcium Intake: yes    Vitals:   Vitals:    11/11/24 1002   BP: 122/85   Site: Left Upper Arm   Cuff Size: Large Adult   Pulse: 81   SpO2: 99%   Weight: (!) 138.3 kg (305 lb)   Height: 1.702 m (5' 7\")        Body mass index is 47.77 kg/m².    Nutrition Diagnosis:  Inadequate food and beverage intake r/t WLS as evidenced by loss of excess body weight gained 2 lbs over 1 Month.    Nutrition Intervention:  Diet: Bariatric Diet, 60-80 gm of protein, and 48-64 oz of fluid    Nutrition Education: Bariatric Binder (diet guidelines and recipes)    Goal:   Continue bariatric diet and continue to add variety to diet as tolerated.   Sip on water or sugar-free fluid throughout the day.   Eat small, frequent meals containing protein, as tolerated.   Consistently take MVIs and minerals to prevent nutrient deficiencies.

## 2024-11-14 RX ORDER — FERROUS SULFATE 325(65) MG
1 TABLET ORAL
Qty: 30 TABLET | Refills: 0 | Status: SHIPPED | OUTPATIENT
Start: 2024-11-14

## 2024-12-20 ENCOUNTER — OFFICE VISIT (OUTPATIENT)
Age: 34
End: 2024-12-20
Payer: MEDICAID

## 2024-12-20 VITALS
RESPIRATION RATE: 20 BRPM | TEMPERATURE: 98.1 F | BODY MASS INDEX: 47.14 KG/M2 | DIASTOLIC BLOOD PRESSURE: 73 MMHG | SYSTOLIC BLOOD PRESSURE: 106 MMHG | HEART RATE: 92 BPM | OXYGEN SATURATION: 97 % | WEIGHT: 293 LBS

## 2024-12-20 DIAGNOSIS — K59.03 DRUG INDUCED CONSTIPATION: Primary | ICD-10-CM

## 2024-12-20 DIAGNOSIS — E53.8 FOLIC ACID DEFICIENCY: ICD-10-CM

## 2024-12-20 DIAGNOSIS — Z98.84 S/P GASTRIC BYPASS: ICD-10-CM

## 2024-12-20 DIAGNOSIS — R79.89 LOW SERUM VITAMIN A: ICD-10-CM

## 2024-12-20 DIAGNOSIS — E55.9 VITAMIN D DEFICIENCY: ICD-10-CM

## 2024-12-20 DIAGNOSIS — D50.8 IRON DEFICIENCY ANEMIA SECONDARY TO INADEQUATE DIETARY IRON INTAKE: ICD-10-CM

## 2024-12-20 DIAGNOSIS — E66.01 MORBID OBESITY WITH BMI OF 45.0-49.9, ADULT: ICD-10-CM

## 2024-12-20 PROCEDURE — 1036F TOBACCO NON-USER: CPT | Performed by: NURSE PRACTITIONER

## 2024-12-20 PROCEDURE — G8417 CALC BMI ABV UP PARAM F/U: HCPCS | Performed by: NURSE PRACTITIONER

## 2024-12-20 PROCEDURE — 99214 OFFICE O/P EST MOD 30 MIN: CPT | Performed by: NURSE PRACTITIONER

## 2024-12-20 PROCEDURE — G8484 FLU IMMUNIZE NO ADMIN: HCPCS | Performed by: NURSE PRACTITIONER

## 2024-12-20 PROCEDURE — G8427 DOCREV CUR MEDS BY ELIG CLIN: HCPCS | Performed by: NURSE PRACTITIONER

## 2024-12-20 RX ORDER — MELATONIN 10 MG
1 TABLET, SUBLINGUAL SUBLINGUAL 2 TIMES DAILY
Qty: 60 TABLET | Refills: 11 | Status: SHIPPED | OUTPATIENT
Start: 2024-12-20

## 2024-12-20 RX ORDER — POLYETHYLENE GLYCOL 3350 17 G/17G
17 POWDER ORAL DAILY
Qty: 510 G | Refills: 0 | Status: SHIPPED | OUTPATIENT
Start: 2024-12-20

## 2024-12-20 NOTE — PROGRESS NOTES
[facility] INVASIVE BARIATRIC SURGERY  [address] [address]  Dept: [phone]    SURGICAL WEIGHT LOSS MANAGEMENT PROGRAM  PROGRESS NOTE     CC: Weight Loss    Patient: Amada Avalos      Service Date: 12/20/2024  Visit: back on track/medication follow up   Medical Record #: [id number]  Date of Surgery: 3/7/2022    History: 33 y.o. female who presents today for a post op follow up for revision to gastric bypass. Patient reports regular bowel movements. Patient denies nausea, vomiting, fever, and chills. Patient denies any pain concerns. She has had weight regain.   Back on track started October 2024.     Medications: Semaglutide through [facility]  Weight loss in the last month: 4 lbs.   Weight loss total since starting medication: 4 lbs  Dose: 0.6 mg weekly  Side effects:  very mild nausea but not bothersome. + constipation - she hasn't tried anything for it.   Denies abdominal pain, vomiting, diarrhea.       Compliant with bariatric MVI and Calcium? She doesn't take a bariatric MVI. She takes calcium 1x per day. She is unsure what the dose is or what brand.   She takes a prenatal and another MVI with iron daily. She takes B12 vitamin daily.     Recent labs? Folate low - she started OTC Folic Acid. Iron Sat was low - I started on daily ferrous sulfate. Vitamin D was low - she is taking the high dose weekly supplement. Vitamin A was low - she is taking OTC vitamin a. She wasn't previously on bariatric vitamin,    She has started bariatric MVI with iron.  She has stopped all the additional supplements.  She is not on calcium supplementation.    She has noticed she is getting full a little quicker then before.  She is still eating consistently throughout the day and hitting her protein goals.    She scheduled her sleep study but didn't have a chance to get out there to get it so she needs to reschedule.     Patient did

## 2025-01-28 ENCOUNTER — OFFICE VISIT (OUTPATIENT)
Dept: BARIATRICS/WEIGHT MGMT | Age: 35
End: 2025-01-28
Payer: MEDICAID

## 2025-01-28 VITALS
DIASTOLIC BLOOD PRESSURE: 78 MMHG | HEIGHT: 66 IN | WEIGHT: 293 LBS | OXYGEN SATURATION: 98 % | SYSTOLIC BLOOD PRESSURE: 122 MMHG | BODY MASS INDEX: 47.09 KG/M2 | HEART RATE: 79 BPM

## 2025-01-28 DIAGNOSIS — R06.83 SNORING: ICD-10-CM

## 2025-01-28 DIAGNOSIS — K59.03 DRUG INDUCED CONSTIPATION: Primary | ICD-10-CM

## 2025-01-28 DIAGNOSIS — R79.89 LOW SERUM VITAMIN A: ICD-10-CM

## 2025-01-28 DIAGNOSIS — D50.8 IRON DEFICIENCY ANEMIA SECONDARY TO INADEQUATE DIETARY IRON INTAKE: ICD-10-CM

## 2025-01-28 DIAGNOSIS — R79.89 LOW VITAMIN B12 LEVEL: ICD-10-CM

## 2025-01-28 DIAGNOSIS — E53.8 FOLIC ACID DEFICIENCY: ICD-10-CM

## 2025-01-28 DIAGNOSIS — E66.01 MORBID OBESITY WITH BMI OF 45.0-49.9, ADULT: ICD-10-CM

## 2025-01-28 DIAGNOSIS — E55.9 VITAMIN D DEFICIENCY: ICD-10-CM

## 2025-01-28 DIAGNOSIS — K90.9 IRON MALABSORPTION: ICD-10-CM

## 2025-01-28 DIAGNOSIS — Z98.84 S/P GASTRIC BYPASS: ICD-10-CM

## 2025-01-28 PROCEDURE — 99214 OFFICE O/P EST MOD 30 MIN: CPT | Performed by: NURSE PRACTITIONER

## 2025-01-28 PROCEDURE — G8417 CALC BMI ABV UP PARAM F/U: HCPCS | Performed by: NURSE PRACTITIONER

## 2025-01-28 PROCEDURE — 1036F TOBACCO NON-USER: CPT | Performed by: NURSE PRACTITIONER

## 2025-01-28 PROCEDURE — G8427 DOCREV CUR MEDS BY ELIG CLIN: HCPCS | Performed by: NURSE PRACTITIONER

## 2025-01-28 NOTE — PROGRESS NOTES
Medical Nutrition Therapy for Metabolic and Bariatric Surgery  Nutrition Follow-Up: Weight Loss    Nutrition Assessment:  Patient is here for medication follow up, patient had the Revision    in 2022.  Patient is taking semaglutide per CAREN Oswald for weight loss.   Patient reports tolerating diet, except bread.   Reports sometimes eating small, frequent meals, sometimes eating protein first, sometimes protein portion with all meals and snacks.   Patient is drinking at least 64-80 oz of fluid and sugar free beverages. Patient typically drinking water, diet coke (2 cans, 3 times per week).  Patient reports consistently taking vitamins and minerals.   Patient reports walking on treadmill for about 1 hour, M-F.  Discussed continued recommendation of protein first, followed by a vegetable, then fruit and save grain portion for last.   Provided protein shake list    24-hr diet recall scanned into chart.    Multivitamin/Mineral Intake: Started bariatric MVI with iron recently.      Calcium Intake: has not yet started what was ordered to pharmacy per NP    Vitals:   Vitals:    01/28/25 1120   BP: 122/78   Pulse: 79   SpO2: 98%   Weight: 134.7 kg (297 lb)   Height: 1.664 m (5' 5.5\")        Body mass index is 48.67 kg/m².    Nutrition Diagnosis:  Inadequate food and beverage intake r/t WLS as evidenced by loss of excess body weight lost 8 lbs over 2 Month.    Nutrition Intervention:  Diet: Bariatric Diet, 60-80 gm of protein, and 48-64 oz of fluid    Nutrition Education: Bariatric Binder (diet guidelines and recipes)    Goal:   Continue bariatric diet and continue to add variety to diet as tolerated.   Sip on water or sugar-free fluid throughout the day.   Eat small, frequent meals containing protein, as tolerated.   Consistently take MVIs and minerals to prevent nutrient deficiencies.   Discuss activity with physician and determine a plan for remaining active.   Recommend bariatric support

## 2025-01-28 NOTE — PROGRESS NOTES
[facility] INVASIVE BARIATRIC SURG  [address]  [address]  [place] [id number]  Dept: [phone]    SURGICAL WEIGHT LOSS MANAGEMENT PROGRAM  PROGRESS NOTE     CC: Weight Loss    Patient: Amada Avalos      Service Date: 1/28/2025  Visit: back on track/medication follow up   Medical Record #: [id number]  Date of Surgery: 3/7/2022    History: 34 y.o. female who presents today for a follow-up on weight loss medication/back on track/history of revision to gastric bypass patient reports regular bowel movements. Patient denies nausea, vomiting, fever, and chills. Patient denies any pain concerns. She has had weight regain.   Back on track started October 2024.     Medications: Semaglutide through [facility]  Weight loss in the last month: 4 lbs.   Weight loss total since starting medication: 8 lbs  Dose: 1.2 mg weekly  Side effects:  none. Constipation resolved with miralax, patient denies anusea.   Denies abdominal pain, vomiting, diarrhea.       Compliant with bariatric MVI and Calcium?  She has started her bariatric multivitamin with iron.  She has stopped all the additional supplements.  She did not start calcium supplementation that was ordered last month and sent to the pharmacy.    Recent labs?  Labs ordered from December to recheck on folate, vitamin D, iron, B12, vitamin A and PTH not completed.  All of those were low previously, she completed supplementation and started bariatric vitamin so these were to recheck on those levels.    She has noticed she is getting full a little quicker then before.  She states that she feels like she is getting enough protein throughout the day but she is not tracking.  I reviewed her diet recall with her and estimated that she got about 40 g of protein yesterday.  I asked if she is prioritizing the protein source at her meal over the vegetable and carbohydrates first and she admits that she is

## 2025-02-17 DIAGNOSIS — K59.03 DRUG INDUCED CONSTIPATION: ICD-10-CM

## 2025-02-17 RX ORDER — POLYETHYLENE GLYCOL 3350 17 G/17G
POWDER, FOR SOLUTION ORAL
Qty: 510 G | Refills: 0 | Status: SHIPPED | OUTPATIENT
Start: 2025-02-17

## 2025-03-24 ENCOUNTER — HOSPITAL ENCOUNTER (OUTPATIENT)
Age: 35
Setting detail: SPECIMEN
Discharge: HOME | End: 2025-03-24

## 2025-03-24 ENCOUNTER — OFFICE VISIT (OUTPATIENT)
Dept: BARIATRICS/WEIGHT MGMT | Age: 35
End: 2025-03-24
Payer: MEDICAID

## 2025-03-24 VITALS
DIASTOLIC BLOOD PRESSURE: 60 MMHG | SYSTOLIC BLOOD PRESSURE: 100 MMHG | WEIGHT: 293 LBS | HEIGHT: 66 IN | BODY MASS INDEX: 47.09 KG/M2 | HEART RATE: 70 BPM

## 2025-03-24 DIAGNOSIS — R79.89 LOW SERUM VITAMIN A: ICD-10-CM

## 2025-03-24 DIAGNOSIS — Z98.84 S/P GASTRIC BYPASS: ICD-10-CM

## 2025-03-24 DIAGNOSIS — E53.8 FOLIC ACID DEFICIENCY: ICD-10-CM

## 2025-03-24 DIAGNOSIS — R79.89 LOW VITAMIN B12 LEVEL: ICD-10-CM

## 2025-03-24 DIAGNOSIS — E66.01 MORBID OBESITY WITH BMI OF 45.0-49.9, ADULT: ICD-10-CM

## 2025-03-24 DIAGNOSIS — E55.9 VITAMIN D DEFICIENCY: ICD-10-CM

## 2025-03-24 DIAGNOSIS — E55.9 VITAMIN D DEFICIENCY: Primary | ICD-10-CM

## 2025-03-24 DIAGNOSIS — D50.8 IRON DEFICIENCY ANEMIA SECONDARY TO INADEQUATE DIETARY IRON INTAKE: ICD-10-CM

## 2025-03-24 LAB
25(OH)D3 SERPL-MCNC: 12.6 NG/ML (ref 30–100)
FOLATE SERPL-MCNC: 5.5 NG/ML (ref 4.8–24.2)
IRON SATN MFR SERPL: 8 % (ref 20–55)
IRON SERPL-MCNC: 27 UG/DL (ref 37–145)
PTH-INTACT SERPL-MCNC: 91 PG/ML (ref 15–65)
TIBC SERPL-MCNC: 360 UG/DL (ref 250–450)
UNSATURATED IRON BINDING CAPACITY: 333 UG/DL (ref 112–347)
VIT B12 SERPL-MCNC: 381 PG/ML (ref 232–1245)

## 2025-03-24 PROCEDURE — 99213 OFFICE O/P EST LOW 20 MIN: CPT | Performed by: NURSE PRACTITIONER

## 2025-03-24 PROCEDURE — G8427 DOCREV CUR MEDS BY ELIG CLIN: HCPCS | Performed by: NURSE PRACTITIONER

## 2025-03-24 PROCEDURE — G8417 CALC BMI ABV UP PARAM F/U: HCPCS | Performed by: NURSE PRACTITIONER

## 2025-03-24 PROCEDURE — 1036F TOBACCO NON-USER: CPT | Performed by: NURSE PRACTITIONER

## 2025-03-24 NOTE — PROGRESS NOTES
Medical Nutrition Therapy for Metabolic and Bariatric Surgery  Nutrition Follow-Up: Weight Loss    Nutrition Assessment:  Patient is here for medication follow up, patient had the Revision in 2022.  Patient is taking semaglutide per CAREN Oswald for weight loss.   Patient reports  tolerating diet, sometimes eating small, frequent meals (patient reports eating 2 times per day), sometimes eating protein first, sometimes protein portion with all meals and snacks.   Patient is drinking at least 64 oz of fluid and sugar free beverages. Patient typically drinking water and diet soda once weekly.  Patient reports consistently  taking vitamins and minerals.   Patient reports walking 1 hour daily to remain active.   Discussed small frequent meals with a protein focus, strength training  .    24-hr diet recall scanned into chart.    Multivitamin/Mineral Intake: Yes, MyBari bariatric multivitamin    Calcium Intake: Yes, prescribed    Vitals:   Vitals:    03/24/25 1037   BP: 100/60   BP Site: Right Upper Arm   Patient Position: Sitting   BP Cuff Size: Large Adult   Pulse: 70   Weight: 135.6 kg (299 lb)   Height: 1.664 m (5' 5.5\")      Body mass index is 49 kg/m².    Nutrition Diagnosis:  Inadequate food and beverage intake r/t WLS as evidenced by loss of excess body weight lost 5 lbs over  3 Years and gained 2 lbs over  2 Months.    Nutrition Intervention:  Diet: Bariatric Diet, 60-80 gm of protein, and 48-64 oz of fluid    Nutrition Education: Bariatric Binder (diet guidelines and recipes)    Goal:   Continue bariatric diet and continue to add variety to diet as tolerated.   Sip on water or sugar-free fluid throughout the day.   Eat small, frequent meals containing protein, as tolerated.   Consistently take MVIs and minerals to prevent nutrient deficiencies.   Discuss activity with physician and determine a plan for remaining active.   Recommend bariatric support groups.    Monitor/Evaluate:  Diet tolerance, 
lbs      Current Visit Weight Information  Weight: 135.6 kg (299 lb)   BMI: Body mass index is 49 kg/m².    Weight loss since surgery:  5 lbs.       Exercising?   [x] Yes    [] No   She states she is walking on the treadmill for an hour every day, Monday through Friday.  Review of Systems:     General  Negative for: [] Weight Change   [x] Fatigue   [x] Fevers & Chills    [] Appetite change [] Other:    Positive for: [x] Weight Change   [] Fatigue   [] Fevers & Chills    [x] Appetite change [] Other:   Cardiac  Negative for: [x] Chest Pain   [] Difficulty Breathing   [] Leg Cramps [x] Edema of Lower Extremeties    [] Left   [] Right      Positive for: [] Chest Pain   [] Difficulty Breathing   [] Leg Cramps [] Edema of Lower Extremeties    [] Left   [] Right   Pulmonary  Negative for: [x] Shortness of Breath [] Wheeze [x] Cough  [x] Calf Pain     Positive for: [] Shortness of Breath [] Wheeze [] Cough  [] Calf Pain   Gastro-Intestinal Negative for: [x] Heartburn   [x] Reflux   [] Dysphagia   [] Melena   [] BRBPR  [x] Vomiting   [x] Abdominal Pain   [] Diarrhea     [] Constipation  [] Other:     Positive for: [] Heartburn   [] Reflux   [] Dysphagia   [] Melena   [] BRBPR  [] Vomiting   [] Abdominal Pain   [] Diarrhea     [] Constipation  [] Other:    Muskuloskeletal Negative for: [] Joint pain   [] Back pain   [] Knee Pain   [] Muscle weakness [x] Hernia   [x] Edema [] Other:     Positive for: [] Joint pain   [] Back pain   [] Knee Pain   [] Muscle weakness [] Hernia   [] Edema [] Other:    Neurologic Negative for: [x] Syncope   [] Insomnia   [x] Being treated for depression  [] Other:     Positive for: [] Syncope   [] Insomnia   [] Being treated for depression  [] Other:    Skin Negative for: [x] Wound:   [] Open   [] Draining   [] Incisional     [x] Rash   [] Hair Loss  [] Other:     Positive for: [] Wound:   [] Open   [] Draining    [] Incisional  [] Rash   [] Hair Loss  [] Other:          Physical Assessment:

## 2025-03-27 ENCOUNTER — RESULTS FOLLOW-UP (OUTPATIENT)
Dept: BARIATRICS/WEIGHT MGMT | Age: 35
End: 2025-03-27

## 2025-03-27 DIAGNOSIS — K90.9 IRON MALABSORPTION: Primary | ICD-10-CM

## 2025-03-27 DIAGNOSIS — E55.9 VITAMIN D DEFICIENCY: ICD-10-CM

## 2025-03-27 LAB
RETINYL PALMITATE: <0.02 MG/L (ref 0–0.1)
VITAMIN A LEVEL: 0.19 MG/L (ref 0.3–1.2)
VITAMIN A, INTERP: ABNORMAL

## 2025-03-27 RX ORDER — FERROUS SULFATE 325(65) MG
325 TABLET ORAL
Qty: 30 TABLET | Refills: 5 | Status: SHIPPED | OUTPATIENT
Start: 2025-03-27

## 2025-03-27 RX ORDER — ERGOCALCIFEROL 1.25 MG/1
50000 CAPSULE, LIQUID FILLED ORAL WEEKLY
Qty: 12 CAPSULE | Refills: 0 | Status: SHIPPED | OUTPATIENT
Start: 2025-03-27

## 2025-07-01 ENCOUNTER — OFFICE VISIT (OUTPATIENT)
Dept: BARIATRICS/WEIGHT MGMT | Age: 35
End: 2025-07-01
Payer: MEDICAID

## 2025-07-01 VITALS
HEART RATE: 74 BPM | SYSTOLIC BLOOD PRESSURE: 100 MMHG | BODY MASS INDEX: 47.09 KG/M2 | HEIGHT: 66 IN | DIASTOLIC BLOOD PRESSURE: 72 MMHG | WEIGHT: 293 LBS | OXYGEN SATURATION: 98 %

## 2025-07-01 DIAGNOSIS — E50.9 VITAMIN A DEFICIENCY: Primary | ICD-10-CM

## 2025-07-01 DIAGNOSIS — R06.83 SNORING: ICD-10-CM

## 2025-07-01 DIAGNOSIS — Z98.84 S/P GASTRIC BYPASS: ICD-10-CM

## 2025-07-01 DIAGNOSIS — E66.01 MORBID OBESITY WITH BMI OF 45.0-49.9, ADULT (HCC): ICD-10-CM

## 2025-07-01 DIAGNOSIS — E55.9 VITAMIN D DEFICIENCY: ICD-10-CM

## 2025-07-01 DIAGNOSIS — G47.19 EXCESSIVE DAYTIME SLEEPINESS: ICD-10-CM

## 2025-07-01 DIAGNOSIS — K90.9 IRON MALABSORPTION: ICD-10-CM

## 2025-07-01 PROCEDURE — 1036F TOBACCO NON-USER: CPT | Performed by: NURSE PRACTITIONER

## 2025-07-01 PROCEDURE — G8427 DOCREV CUR MEDS BY ELIG CLIN: HCPCS | Performed by: NURSE PRACTITIONER

## 2025-07-01 PROCEDURE — G8417 CALC BMI ABV UP PARAM F/U: HCPCS | Performed by: NURSE PRACTITIONER

## 2025-07-01 PROCEDURE — 99214 OFFICE O/P EST MOD 30 MIN: CPT | Performed by: NURSE PRACTITIONER

## 2025-07-01 RX ORDER — VITAMIN A 3000 MCG
3 CAPSULE ORAL DAILY
Qty: 30 CAPSULE | Refills: 3 | Status: SHIPPED | OUTPATIENT
Start: 2025-07-01

## 2025-07-01 RX ORDER — PHENTERMINE HYDROCHLORIDE 37.5 MG/1
37.5 TABLET ORAL
Qty: 30 TABLET | Refills: 0 | Status: SHIPPED | OUTPATIENT
Start: 2025-07-01 | End: 2025-07-31

## 2025-07-01 ASSESSMENT — ENCOUNTER SYMPTOMS
CONSTIPATION: 0
WHEEZING: 0
SHORTNESS OF BREATH: 0
CHEST TIGHTNESS: 0
NAUSEA: 0
COUGH: 0
ABDOMINAL PAIN: 0
VOMITING: 0

## 2025-07-01 NOTE — PROGRESS NOTES
Medical Nutrition Therapy for Metabolic and Bariatric Surgery  Nutrition Follow-Up: Weight Loss    Nutrition Assessment:  Patient is here for medication weight loss. Patient had the Revision   in 2022.  Patient is starting adipex per CAREN Oswald for weight loss.   Patient reports  tolerating diet, never eating small, frequent meals (only eating 2 times per day), always eating protein first, always protein portion with all meals and snacks. Patient reports snacking throughout the night.  Patient is drinking at least 48-64 oz of fluid and sugar free beverages. Patient typically drinking water, diet soda, and zero sugar lemonade  Patient reports consistently taking vitamins and minerals.   Patient reports walking 30 minutes to remain active.   Patient reports goal of increasing exercise and focusing on small frequent meals.    24-hr diet recall scanned into chart.    Multivitamin/Mineral Intake: Yes, bariatric per patient    Calcium Intake: Yes, 500 mg BID    Vitals:   Vitals:    07/01/25 1545   BP: 100/72   BP Site: Left Upper Arm   Patient Position: Sitting   BP Cuff Size: Large Adult   Pulse: 74   SpO2: 98%   Weight: (!) 136.5 kg (301 lb)   Height: 1.664 m (5' 5.5\")        Body mass index is 49.33 kg/m².    Nutrition Diagnosis:  Inadequate food and beverage intake r/t WLS as evidenced by loss of excess body weight lost 3 lbs over 3 Years and gained 2 lbs over 3 Months.    Nutrition Intervention:  Diet: Bariatric Diet, 60-80 gm of protein, and 48-64 oz of fluid    Nutrition Education: Bariatric Binder (diet guidelines and recipes)    Goal:   Continue bariatric diet and continue to add variety to diet as tolerated.   Sip on water or sugar-free fluid throughout the day.   Eat small, frequent meals containing protein, as tolerated.   Consistently take MVIs and minerals to prevent nutrient deficiencies.   Discuss activity with physician and determine a plan for remaining active.   Recommend bariatric support

## 2025-07-01 NOTE — PROGRESS NOTES
[facility] INVASIVE BARIATRIC SURG  [address]  [address]  [facility] [address]  Dept: [phone]    SURGICAL WEIGHT LOSS MANAGEMENT PROGRAM  PROGRESS NOTE     CC: Weight Loss    Patient: Amada Avalos      Service Date: 7/1/2025  Visit: back on track/medication follow up   Medical Record #: [id number]  Date of Surgery: 3/7/2022    History: 34 y.o. female who presents today for a follow-up on weight loss medication/back on track/history of revision to gastric bypass. Patient reports regular bowel movements. Patient denies nausea, vomiting, fever, and chills. Patient denies any pain concerns. She has had weight regain hence why back on track visit were started in October 2024.  Patient has not been seen in March.     Patient states she lost her job and financially she could not afford to continue the compounded semaglutide.  She has gained 2 pounds since we saw her last.    She has been compliant with bariatric advantage calcium supplement and bariatric advantage multivitamin with iron.    Labs completed on 3/24/2025 - Vitamin A, vitamin D and iron are low.  She is taking a separate iron supplement daily, high-dose weekly vitamin D, a daily vitamin D.  We had recommended an over-the-counter vitamin A supplement but she did not start that.      Patient previously complained of excessive daytime sleepiness and snoring.  Home sleep study was ordered in October but it still has not been completed.      Height: 1.664 m (5' 5.5\") 5 ft 7 in  Highest Weight: 304 lbs      Current Visit Weight Information  Weight: (!) 136.5 kg (301 lb)   BMI: Body mass index is 49.33 kg/m².    Weight loss since surgery:  3 lbs.       Exercising?   [x] Yes    [] No   Walking for 30 minutes  Review of Systems:     Review of Systems   Constitutional:  Positive for fatigue. Negative for appetite change, chills, diaphoresis and fever.        + obesity   Respiratory:  Negative for cough,

## 2025-07-30 ENCOUNTER — OFFICE VISIT (OUTPATIENT)
Dept: BARIATRICS/WEIGHT MGMT | Age: 35
End: 2025-07-30
Payer: MEDICAID

## 2025-07-30 VITALS
BODY MASS INDEX: 47.09 KG/M2 | OXYGEN SATURATION: 98 % | HEIGHT: 66 IN | SYSTOLIC BLOOD PRESSURE: 104 MMHG | DIASTOLIC BLOOD PRESSURE: 66 MMHG | WEIGHT: 293 LBS | HEART RATE: 86 BPM

## 2025-07-30 DIAGNOSIS — E55.9 VITAMIN D DEFICIENCY: ICD-10-CM

## 2025-07-30 DIAGNOSIS — E50.9 VITAMIN A DEFICIENCY: ICD-10-CM

## 2025-07-30 DIAGNOSIS — E66.01 MORBID OBESITY WITH BMI OF 45.0-49.9, ADULT (HCC): ICD-10-CM

## 2025-07-30 DIAGNOSIS — Z98.84 S/P GASTRIC BYPASS: ICD-10-CM

## 2025-07-30 DIAGNOSIS — K90.9 IRON MALABSORPTION: Primary | ICD-10-CM

## 2025-07-30 PROCEDURE — 1036F TOBACCO NON-USER: CPT | Performed by: NURSE PRACTITIONER

## 2025-07-30 PROCEDURE — G8427 DOCREV CUR MEDS BY ELIG CLIN: HCPCS | Performed by: NURSE PRACTITIONER

## 2025-07-30 PROCEDURE — 99214 OFFICE O/P EST MOD 30 MIN: CPT | Performed by: NURSE PRACTITIONER

## 2025-07-30 PROCEDURE — G8417 CALC BMI ABV UP PARAM F/U: HCPCS | Performed by: NURSE PRACTITIONER

## 2025-07-30 RX ORDER — PHENTERMINE HYDROCHLORIDE 37.5 MG/1
37.5 TABLET ORAL
Qty: 30 TABLET | Refills: 0 | Status: SHIPPED | OUTPATIENT
Start: 2025-07-30 | End: 2025-08-29

## 2025-07-30 ASSESSMENT — ENCOUNTER SYMPTOMS
NAUSEA: 0
COUGH: 0
WHEEZING: 0
SHORTNESS OF BREATH: 0
VOMITING: 0
ABDOMINAL PAIN: 0
CONSTIPATION: 0
CHEST TIGHTNESS: 0

## 2025-07-30 NOTE — PROGRESS NOTES
[facility] INVASIVE BARIATRIC SURG  [address]  [address]  [place] [id number]  Dept: [phone]    SURGICAL WEIGHT LOSS MANAGEMENT PROGRAM  PROGRESS NOTE     CC: Weight Loss    Patient: Amada Avalos      Service Date: 7/30/2025  Visit: back on track/medication follow up   Medical Record #: [id number]  Date of Surgery: 3/7/2022    History: 34 y.o. female who presents today for a follow-up on weight loss medication/back on track/history of revision to gastric bypass. Patient reports regular bowel movements. Patient denies nausea, vomiting, fever, and chills. Patient denies any pain concerns. She has had weight regain hence why back on track visit were started in October 2024.  Initally was started on Semaglutide through [facility] but had financial struggles so she was not able to continue with it. Adipex was started on 7/1/2025.    She has been compliant with bariatric advantage calcium supplement and bariatric advantage multivitamin with iron. She has also been taking the vitamin d, vitamin A and iron supplements that were ordered.     She has completed 1 months of Adipex.     Medication: Adipex  Weight loss in the last month: 0 lbs  Weight loss since starting medication: 0 lbs  Dose: 37.5 mg  Side effects: constipation and dry mouth. She is drinking 3-4 bottles per day. She taking miralax and that is helping.     Patient denies palpitations, hypertension, tachycardia, insomnia, anxiety, depression, mental health changes    Home sleep study has been ordered twice since last year. She hasn't completed it. It was ordered again last month.  She states she hasn't gotten a call from them.     She states she has been under a lot of stress. She recently lost her grandmother. She is still struggling with grief.   She states that she has been slacking on her exercise.     Height: 1.664 m (5' 5.5\") 5 ft 7 in  Highest Weight: 304

## 2025-07-30 NOTE — PROGRESS NOTES
Medical Nutrition Therapy for Metabolic and Bariatric Surgery  Nutrition Follow-Up: Weight Loss    Nutrition Assessment:  Patient had the revision in 2022  Patient is taking adipex per CAREN Oswald for weight loss.   Feels she needs to get more protein in, exercise more and drink more water.  Patient reports tolerating diet, never eating small, frequent meals (only eating 2 times per day), always eating protein first, always protein portion with all meals and snacks.   Patient is drinking at least 48-64 oz of fluid and sugar free beverages. Patient typically drinking water, diet soda (2 days per week), and zero sugar lemonade.  Patient reports consistently taking vitamins and minerals.   Patient reports walking 30 minutes to remain active. Has slowed down on exercise.  Discussed lean protein options with meals, continue to increase water intake and benefit of strengthening exercises.    24-hr diet recall scanned into chart.    Multivitamin/Mineral Intake: Yes, bariatric per patient     Calcium Intake: Yes, 500 mg BID    Vitals:   Vitals:    07/30/25 1136   BP: 104/66   Pulse: 86   SpO2: 98%   Weight: (!) 136.5 kg (301 lb)   Height: 1.664 m (5' 5.5\")        Body mass index is 49.33 kg/m².    Nutrition Diagnosis:  Inadequate food and beverage intake r/t WLS as evidenced by loss of excess body weight lost 0 lbs over 1 Month.    Nutrition Intervention:  Diet: Bariatric Diet, 60-80 gm of protein, and 48-64 oz of fluid    Nutrition Education: Bariatric Binder (diet guidelines and recipes)    Goal:   Continue bariatric diet and continue to add variety to diet as tolerated.   Sip on water or sugar-free fluid throughout the day.   Eat small, frequent meals containing protein, as tolerated.   Consistently take MVIs and minerals to prevent nutrient deficiencies.   Discuss activity with physician and determine a plan for remaining active.   Recommend bariatric support groups.    Monitor/Evaluate:  Diet tolerance,

## (undated) DEVICE — YANKAUER,FLEXIBLE HANDLE,REGLR CAPACITY: Brand: MEDLINE INDUSTRIES, INC.

## (undated) DEVICE — REDUCER: Brand: ENDOWRIST

## (undated) DEVICE — PLUMEPORT SEO LAPAROSCOPIC SMOKE FILTRATION DEVICE: Brand: PLUMEPORT

## (undated) DEVICE — TIP COVER ACCESSORY

## (undated) DEVICE — FORCEPS BX L240CM WRK CHN 2.8MM STD CAP W/ NDL MIC MESH

## (undated) DEVICE — SOLUTION ANTIFOG VIS SYS CLEARIFY LAPSCP

## (undated) DEVICE — COUNTER NDL 40 COUNT HLD 70 FOAM BLK ADH W/ MAG

## (undated) DEVICE — STAPLER INT W25MM WHT TRNSOR CIR ANVIL W/ ADVANCING PROX

## (undated) DEVICE — BLADELESS OBTURATOR: Brand: WECK VISTA

## (undated) DEVICE — GAUZE,SPONGE,FLUFF,6"X6.75",STRL,5/TRAY: Brand: MEDLINE

## (undated) DEVICE — SNARE ENDOSCP AD SM L240CM LOOP W1.3CM SHTH DIA2.4MM POLYP

## (undated) DEVICE — STAPLER 60 RELOAD WHITE: Brand: SUREFORM

## (undated) DEVICE — DRAIN SURG PENROSE 0.25X18 IN CLOSED WND DRAINAGE PREM SIL

## (undated) DEVICE — DRESSING TRNSPAR W5XL4.5IN FLM SHT SEMIPERMEABLE WIND

## (undated) DEVICE — PROTECTOR ULN NRV PUR FOAM HK LOOP STRP ANATOMICALLY

## (undated) DEVICE — NEEDLE SPINAL 22GA L3.5IN SPINOCAN

## (undated) DEVICE — TOWEL,OR,DSP,ST,NATURAL,DLX,4/PK,20PK/CS: Brand: MEDLINE

## (undated) DEVICE — SUTURE ABSRB BRAID COAT VLT SH 3-0 27IN VCRL J311H

## (undated) DEVICE — ARM DRAPE

## (undated) DEVICE — JELLY,LUBE,STERILE,FLIP TOP,TUBE,2-OZ: Brand: MEDLINE

## (undated) DEVICE — GLOVE ORANGE PI 7 1/2   MSG9075

## (undated) DEVICE — COVER LT HNDL BLU PLAS

## (undated) DEVICE — CANNULA IV 18GA L15IN BLNT FILL LUERLOCK HUB MJCT

## (undated) DEVICE — GLOVE ORANGE PI 8 1/2   MSG9085

## (undated) DEVICE — SUTURE NONABSORBABLE MONOFILAMENT 3-0 PS-1 18 IN BLK ETHILON 1663H

## (undated) DEVICE — Device: Brand: DEFENDO VALVE AND CONNECTOR KIT

## (undated) DEVICE — APPLIER CLP M L L11.4IN DIA10MM ENDOSCP ROT MULT FOR LIG

## (undated) DEVICE — Device

## (undated) DEVICE — BINDER ABD 2XL W9IN CIRC 62 73IN 3 PNL E HK AND LOOP CLSR W

## (undated) DEVICE — GOWN,POLY REINFORCED,LG: Brand: MEDLINE

## (undated) DEVICE — CANNULA SEAL

## (undated) DEVICE — CONNECTOR TBNG AUX H2O JET DISP FOR OLY 160/180 SER

## (undated) DEVICE — ELECTRO LUBE IS A SINGLE PATIENT USE DEVICE THAT IS INTENDED TO BE USED ON ELECTROSURGICAL ELECTRODES TO REDUCE STICKING.: Brand: KEY SURGICAL ELECTRO LUBE

## (undated) DEVICE — POLYP TRAP: Brand: TRAPEASE®

## (undated) DEVICE — DEFENDO AIR WATER SUCTION AND BIOPSY VALVE KIT FOR  OLYMPUS: Brand: DEFENDO AIR/WATER/SUCTION AND BIOPSY VALVE

## (undated) DEVICE — STAPLER INT L L25MM DIA5MM GI WHT TI BARIATRIC CIR CUT 2

## (undated) DEVICE — FORCEPS BX L240CM JAW DIA2.8MM L CAP W/ NDL MIC MESH TOOTH

## (undated) DEVICE — BASIN EMSIS 700ML GRAPHITE PLAS KID SHP GRAD

## (undated) DEVICE — BITEBLOCK 54FR W/ DENT RIM BLOX

## (undated) DEVICE — ADAPTER,CATHETER/SYRINGE/LUER,STERILE: Brand: MEDLINE

## (undated) DEVICE — DRAIN SURG 19FR 100% SIL RADPQ RND CHN FULL FLUT

## (undated) DEVICE — GOWN,SIRUS,NONRNF,SETINSLV,XL,20/CS: Brand: MEDLINE

## (undated) DEVICE — SUTURE SZ 0 27IN 5/8 CIR UR-6  TAPER PT VIOLET ABSRB VICRYL J603H

## (undated) DEVICE — GLOVE SURG SZ 65 THK91MIL LTX FREE SYN POLYISOPRENE

## (undated) DEVICE — LEGGINGS, PAIR, 31X48, STERILE: Brand: MEDLINE

## (undated) DEVICE — ADHESIVE SKIN CLOSURE TOP 36 CC HI VISC DERMBND MINI

## (undated) DEVICE — ENDO KIT W/SYRINGE: Brand: MEDLINE INDUSTRIES, INC.

## (undated) DEVICE — TROCAR: Brand: KII FIOS FIRST ENTRY

## (undated) DEVICE — APPLICATOR MEDICATED 26 CC SOLUTION HI LT ORNG CHLORAPREP

## (undated) DEVICE — SIMPLICITY FLUFF UNDERPAD 23X36, MODERATE: Brand: SIMPLICITY

## (undated) DEVICE — GARMENT,MEDLINE,DVT,INT,CALF,MED, GEN2: Brand: MEDLINE

## (undated) DEVICE — SEAL

## (undated) DEVICE — GLOVE ORANGE PI 7   MSG9070

## (undated) DEVICE — SUTURE VCRL SZ 2-0 L27IN ABSRB VLT L26MM SH 1/2 CIR J317H

## (undated) DEVICE — 4-PORT MANIFOLD: Brand: NEPTUNE 2

## (undated) DEVICE — GOWN,AURORA,NONREINFORCED,LARGE: Brand: MEDLINE

## (undated) DEVICE — VESSEL SEALER EXTEND: Brand: ENDOWRIST

## (undated) DEVICE — RESERVOIR,SUCTION,100CC,SILICONE: Brand: MEDLINE

## (undated) DEVICE — TUBING, SUCTION, 9/32" X 20', STRAIGHT: Brand: MEDLINE INDUSTRIES, INC.

## (undated) DEVICE — SUTURE ETHBND EXCEL SZ 3-0 L30IN NONABSORBABLE GRN L26MM SH X832H

## (undated) DEVICE — STAPLER 60: Brand: SUREFORM

## (undated) DEVICE — STAPLER 60 RELOAD GREEN: Brand: SUREFORM

## (undated) DEVICE — TUBING, SUCTION, 3/16" X 10', STRAIGHT: Brand: MEDLINE

## (undated) DEVICE — SUTURE MCRYL SZ 4-0 L18IN ABSRB UD L16MM PC-3 3/8 CIR PRIM Y845G

## (undated) DEVICE — INSUFFLATION TUBING SET WITH FILTER, FUNNEL CONNECTOR AND LUER LOCK: Brand: JOSNOE MEDICAL INC

## (undated) DEVICE — SUTURE VCRL SZ 3-0 L27IN ABSRB UD L26MM SH 1/2 CIR J416H

## (undated) DEVICE — SUTURE ETHBND EXCEL SZ 0 L30IN NONABSORBABLE GRN L26MM CT-2 X412H